# Patient Record
Sex: FEMALE | Race: WHITE | NOT HISPANIC OR LATINO | Employment: FULL TIME | URBAN - METROPOLITAN AREA
[De-identification: names, ages, dates, MRNs, and addresses within clinical notes are randomized per-mention and may not be internally consistent; named-entity substitution may affect disease eponyms.]

---

## 2017-02-27 ENCOUNTER — OFFICE VISIT (OUTPATIENT)
Dept: FAMILY MEDICINE CLINIC | Facility: CLINIC | Age: 47
End: 2017-02-27

## 2017-02-27 ENCOUNTER — HOSPITAL ENCOUNTER (OUTPATIENT)
Dept: GENERAL RADIOLOGY | Facility: HOSPITAL | Age: 47
Discharge: HOME | End: 2017-02-27
Admitting: PHYSICIAN ASSISTANT

## 2017-02-27 ENCOUNTER — TELEPHONE (OUTPATIENT)
Dept: FAMILY MEDICINE CLINIC | Facility: CLINIC | Age: 47
End: 2017-02-27

## 2017-02-27 VITALS
HEART RATE: 87 BPM | TEMPERATURE: 98.2 F | HEIGHT: 63 IN | RESPIRATION RATE: 18 BRPM | DIASTOLIC BLOOD PRESSURE: 78 MMHG | OXYGEN SATURATION: 97 % | SYSTOLIC BLOOD PRESSURE: 132 MMHG | BODY MASS INDEX: 38.8 KG/M2 | WEIGHT: 219 LBS

## 2017-02-27 DIAGNOSIS — R06.02 SHORTNESS OF BREATH: Primary | ICD-10-CM

## 2017-02-27 DIAGNOSIS — Z82.49 FAMILY HISTORY OF EARLY CAD: ICD-10-CM

## 2017-02-27 DIAGNOSIS — R05.3 CHRONIC COUGH: ICD-10-CM

## 2017-02-27 DIAGNOSIS — R06.02 SHORTNESS OF BREATH: ICD-10-CM

## 2017-02-27 PROCEDURE — 93000 ELECTROCARDIOGRAM COMPLETE: CPT | Performed by: PHYSICIAN ASSISTANT

## 2017-02-27 PROCEDURE — 99214 OFFICE O/P EST MOD 30 MIN: CPT | Performed by: PHYSICIAN ASSISTANT

## 2017-02-27 PROCEDURE — 71020 HC CHEST PA AND LATERAL: CPT

## 2017-02-27 RX ORDER — CETIRIZINE HYDROCHLORIDE 10 MG/1
10 TABLET ORAL DAILY
COMMUNITY

## 2017-02-27 RX ORDER — EPINEPHRINE 0.3 MG/.3ML
INJECTION INTRAMUSCULAR
COMMUNITY
Start: 2017-02-22

## 2017-02-27 RX ORDER — OMEPRAZOLE 40 MG/1
40 CAPSULE, DELAYED RELEASE ORAL DAILY
Qty: 30 CAPSULE | Refills: 3 | Status: SHIPPED | OUTPATIENT
Start: 2017-02-27 | End: 2017-11-30 | Stop reason: SDUPTHER

## 2017-02-27 RX ORDER — MOMETASONE FUROATE 50 UG/1
SPRAY, METERED NASAL
COMMUNITY
Start: 2017-02-19 | End: 2019-05-22

## 2017-02-27 NOTE — TELEPHONE ENCOUNTER
----- Message from Heidi Moe PA-C sent at 2/27/2017 12:54 PM EST -----  Notify patient that her chest x-ray was normal.

## 2017-02-27 NOTE — PROGRESS NOTES
Subjective   Juliet Fowler is a 47 y.o. female.   Chief Complaint   Patient presents with   • Cough     productive cough, pt has had cough for a few months, lots of drainage     History of Present Illness     Juliet is a 47-year-old female who presents with chronic cough for the past year or so.  The cough comes and goes.  She saw allergist, Dr. Metcalf, in January 2017.  She was told she was allergic to dust mites, mold spores and Bird grasses.  She has been doing allergy shots weekly.  She has tried over-the-counter Zantac occasionally for possible reflux.  The allergist states that it may be related to reflux  versus allergic rhinitis versus asthma.   Juliet states she does have shortness of air,wheezing and clear productive cough that waxes and wanes.  She denied any fevers, chills, night sweats, epigastric pain, heartburn, reflux symptoms, abdominal pain or swelling of her ankles.  Her father has had open-heart surgery and grandfather has had a heart attack.  Denied any smoking abuse.  She has been exposed to second hand growing up at home.  Caffeine intake is one glass of tea a day.    The following portions of the patient's history were reviewed and updated as appropriate: allergies, current medications, past family history, past medical history, past social history and past surgical history.    Review of Systems   Constitutional: Negative.  Negative for chills, fatigue and fever.   HENT: Positive for postnasal drip. Negative for congestion, ear discharge, rhinorrhea, sinus pressure, sneezing and sore throat.    Eyes: Negative.    Respiratory: Positive for cough, shortness of breath and wheezing. Negative for chest tightness.    Cardiovascular: Negative.  Negative for chest pain, palpitations and leg swelling.   Gastrointestinal: Negative.  Negative for abdominal pain, constipation, diarrhea, nausea and vomiting.   Endocrine: Negative.    Genitourinary: Negative.    Musculoskeletal: Negative.    Skin:  Negative.    Allergic/Immunologic: Negative.    Neurological: Negative.  Negative for dizziness and headaches.   Hematological: Negative.    Psychiatric/Behavioral: Negative.  Negative for sleep disturbance and suicidal ideas.   All other systems reviewed and are negative.      Vitals:    02/27/17 1032   BP: 132/78   Pulse: 87   Resp: 18   Temp: 98.2 °F (36.8 °C)   SpO2: 97%     Blood Pressures during visit    02/27/17 1032   BP: 132/78     Wt Readings from Last 3 Encounters:   02/27/17 219 lb (99.3 kg)   02/24/16 212 lb 4.8 oz (96.3 kg)       BP Readings from Last 3 Encounters:   02/27/17 132/78   02/24/16 134/90     Body mass index is 38.79 kg/(m^2).   No Known Allergies      Objective   Physical Exam   Constitutional: She is oriented to person, place, and time. Vital signs are normal. She appears well-developed and well-nourished.   HENT:   Head: Normocephalic and atraumatic.   Right Ear: Hearing, tympanic membrane, external ear and ear canal normal.   Left Ear: Hearing, tympanic membrane, external ear and ear canal normal.   Nose: Nose normal. Right sinus exhibits no maxillary sinus tenderness and no frontal sinus tenderness. Left sinus exhibits no maxillary sinus tenderness and no frontal sinus tenderness.   Mouth/Throat: Uvula is midline and mucous membranes are normal.   Slight post nasal drip   Eyes: Conjunctivae, EOM and lids are normal.   Neck: Trachea normal and phonation normal. Neck supple. No edema present.   Cardiovascular: Normal rate, regular rhythm, S1 normal, S2 normal, normal heart sounds and normal pulses.    Pulmonary/Chest: Effort normal. She has wheezes in the right upper field and the left upper field.   Abdominal: Soft. Normal appearance, normal aorta and bowel sounds are normal. There is no hepatomegaly. There is no tenderness.   Lymphadenopathy:     She has no cervical adenopathy.   Neurological: She is alert and oriented to person, place, and time.   Skin: Skin is warm, dry and intact.  "  Psychiatric: She has a normal mood and affect. Her speech is normal and behavior is normal. Judgment and thought content normal. Cognition and memory are normal.         ECG 12 Lead  Date/Time: 2/27/2017 10:53 AM  Performed by: TORITO GRACE  Authorized by: TORITO GRACE   Comparison: not compared with previous ECG   Previous ECG: no previous ECG available  Rhythm: sinus rhythm  Rate: normal  BPM: 81  Conduction: conduction normal  ST Segments: ST segments normal  T Waves: T waves normal  QRS axis: normal  Clinical impression: normal ECG  Comments: Indication: Shortness of breath and family history of CAD  P/SC:  110/170 ms  QRS:  72 ms  QT/QTc:  346/402 ms            Assessment/Plan   Juliet was seen today for cough.    Diagnoses and all orders for this visit:    Shortness of breath  -     ECG 12 Lead  -     XR Chest PA & Lateral; Future    Family history of early CAD  -     ECG 12 Lead  -     XR Chest PA & Lateral; Future    Chronic cough  -     XR Chest PA & Lateral; Future  -     omeprazole (priLOSEC) 40 MG capsule; Take 1 capsule by mouth Daily.      Ms. Chung \"Scarlet\" Janeth was seen in office today and diagnosed with chronic cough, shortness of breath and family history of CAD.  She had a normal EKG at office visit today.  I have reviewed her allergy office notes with her at today's office visit as well.  She will have a chest x-ray at the [place] facility today for further evaluation.  I have prescribed generic Prilosec to pharmacy to see if this is possible silent reflux related.  I've encouraged Scarlet to keep her appointment with her allergist in March.  She'll be notified of chest x-ray results when completed.  She was encouraged to continue using her inhalers as directed.  She voiced understanding.    Lorenzo transcription disclaimer    Much of this encounter note is an electronic transcription/translation of spoken language to printed text.  The electronic translation of spoken language may " permit erroneous, or at times, nonsensical words or phrases to be inadvertently transcribed.  Although I have reviewed the note for such errors, some may still exist.    Heidi Moe PA-C  Family Practice

## 2017-09-08 ENCOUNTER — TRANSCRIBE ORDERS (OUTPATIENT)
Dept: ADMINISTRATIVE | Facility: HOSPITAL | Age: 47
End: 2017-09-08

## 2017-09-08 DIAGNOSIS — Z12.31 VISIT FOR SCREENING MAMMOGRAM: Primary | ICD-10-CM

## 2017-09-21 ENCOUNTER — HOSPITAL ENCOUNTER (OUTPATIENT)
Dept: MAMMOGRAPHY | Facility: HOSPITAL | Age: 47
Discharge: HOME | End: 2017-09-21
Admitting: NURSE PRACTITIONER

## 2017-09-21 DIAGNOSIS — Z12.31 VISIT FOR SCREENING MAMMOGRAM: ICD-10-CM

## 2017-09-21 PROCEDURE — 77063 BREAST TOMOSYNTHESIS BI: CPT

## 2017-09-21 PROCEDURE — G0202 SCR MAMMO BI INCL CAD: HCPCS

## 2017-11-30 ENCOUNTER — OFFICE VISIT (OUTPATIENT)
Dept: FAMILY MEDICINE CLINIC | Facility: CLINIC | Age: 47
End: 2017-11-30

## 2017-11-30 VITALS
HEIGHT: 63 IN | SYSTOLIC BLOOD PRESSURE: 130 MMHG | BODY MASS INDEX: 39.34 KG/M2 | HEART RATE: 103 BPM | DIASTOLIC BLOOD PRESSURE: 72 MMHG | TEMPERATURE: 98.5 F | RESPIRATION RATE: 16 BRPM | WEIGHT: 222 LBS | OXYGEN SATURATION: 97 %

## 2017-11-30 DIAGNOSIS — R05.3 CHRONIC COUGH: ICD-10-CM

## 2017-11-30 DIAGNOSIS — N89.8 VAGINAL ITCHING: Primary | ICD-10-CM

## 2017-11-30 PROCEDURE — 99213 OFFICE O/P EST LOW 20 MIN: CPT | Performed by: PHYSICIAN ASSISTANT

## 2017-11-30 RX ORDER — OMEPRAZOLE 40 MG/1
40 CAPSULE, DELAYED RELEASE ORAL DAILY
Qty: 30 CAPSULE | Refills: 6 | Status: SHIPPED | OUTPATIENT
Start: 2017-11-30 | End: 2018-11-19 | Stop reason: SDUPTHER

## 2017-11-30 RX ORDER — FLUCONAZOLE 150 MG/1
150 TABLET ORAL DAILY
Qty: 2 TABLET | Refills: 0 | Status: SHIPPED | OUTPATIENT
Start: 2017-11-30 | End: 2018-11-19

## 2017-11-30 NOTE — PROGRESS NOTES
Subjective   Juliet Fowler is a 47 y.o. female.   Chief Complaint   Patient presents with   • Cough       History of Present Illness     Juliet is 47 showed female who presents with  Cough for the past several weeks. She has seen Urgent Care in [place].  She was prescribed antibiotic and steroids twice.  Her last UC visit was November 20,2017. She was prescribed Augmentin and a Medrol dose [place].  Juliet states she is not any better after medications.  She was having shortness of air and wheezing last week.  Juliet states that her breathing is better.  She has been using her inhalers every few days.  Scarlet has had some oral thrush,vaginal itching and diarrhea from the antibiotics. She has had increased post nasal drip,occasional sore throat/ear pressure and hoarse voice off and on for the past few weeks. Denied any fevers,chills,nausea,vomiting,or headaches.  Juliet has taken OTC Mucinex and Dayquil which has helped slightly.  She was taking Omeprazole but ran out several months ago.  When she was taking the omeprazole she was doing better with the cough.  Diet has been slightly healthy.  She has been drinking less caffeine and no spicy foods.  She has seen the allergist in past who thought her cough was related to Reflux.    The following portions of the patient's history were reviewed and updated as appropriate: allergies, current medications, past family history, past medical history, past social history and past surgical history.    Review of Systems   Constitutional: Negative.    HENT: Positive for ear pain, sore throat and voice change.    Eyes: Negative.    Respiratory: Positive for cough. Negative for chest tightness, shortness of breath and wheezing.    Cardiovascular: Negative.  Negative for chest pain, palpitations and leg swelling.   Gastrointestinal: Positive for diarrhea. Negative for constipation, nausea and vomiting.   Endocrine: Negative.    Genitourinary: Negative.         Vaginal itching secondary  "to recent antibiotic usage   Musculoskeletal: Negative.    Skin: Negative.    Allergic/Immunologic: Negative.    Neurological: Negative.    Hematological: Negative.    Psychiatric/Behavioral: Negative.    All other systems reviewed and are negative.    Vitals:    11/30/17 0853   BP: 130/72   BP Location: Left arm   Patient Position: Sitting   Cuff Size: Large Adult   Pulse: 103   Resp: 16   Temp: 98.5 °F (36.9 °C)   TempSrc: Oral   SpO2: 97%   Weight: 222 lb (101 kg)   Height: 63\" (160 cm)       Wt Readings from Last 3 Encounters:   11/30/17 222 lb (101 kg)   02/27/17 219 lb (99.3 kg)   02/24/16 212 lb 4.8 oz (96.3 kg)       BP Readings from Last 3 Encounters:   11/30/17 130/72   02/27/17 132/78   02/24/16 134/90     Body mass index is 39.33 kg/(m^2).  No Known Allergies    Objective   Physical Exam   Constitutional: She is oriented to person, place, and time. Vital signs are normal. She appears well-developed and well-nourished.   HENT:   Head: Normocephalic and atraumatic.   Right Ear: Hearing, tympanic membrane, external ear and ear canal normal.   Left Ear: Hearing, tympanic membrane, external ear and ear canal normal.   Nose: Nose normal. Right sinus exhibits no maxillary sinus tenderness and no frontal sinus tenderness. Left sinus exhibits no maxillary sinus tenderness and no frontal sinus tenderness.   Mouth/Throat: Uvula is midline and mucous membranes are normal.   Slight post nasal drip   Eyes: Conjunctivae, EOM and lids are normal.   Neck: Trachea normal and phonation normal. Neck supple. No edema present. No thyromegaly present.   Cardiovascular: Normal rate, regular rhythm, S1 normal, S2 normal, normal heart sounds and normal pulses.    Pulmonary/Chest: Effort normal and breath sounds normal.   Abdominal: Soft. Normal appearance, normal aorta and bowel sounds are normal. There is no hepatomegaly. There is no tenderness.   Lymphadenopathy:     She has no cervical adenopathy.   Neurological: She is alert " and oriented to person, place, and time.   Skin: Skin is warm, dry and intact.   Psychiatric: She has a normal mood and affect. Her speech is normal and behavior is normal. Judgment and thought content normal. Cognition and memory are normal.       Assessment/Plan   Juliet was seen today for cough.    Diagnoses and all orders for this visit:    Vaginal itching  -     fluconazole (DIFLUCAN) 150 MG tablet; Take 1 tablet by mouth Daily. May repeat dose 4 days later if needed    Chronic cough  -     omeprazole (priLOSEC) 40 MG capsule; Take 1 capsule by mouth Daily.      1.  Chronic cough: I suspect her cough is related to Reflux.  I have sent a prescription for refill on her generic Prilosec medication.  She will continue using her inhalers for asthma as directed.  She has no improvement with cough she will schedule follow-up appointment with office.  2.  New vaginal itching: I suspect this is related to her recent rounds of antibiotic.  I have prescribed Diflucan to pharmacy.  Juliet was instructed to use over-the-counter probiotics or yogurt.  She will return to office if no improvement.        Dragon transcription disclaimer    Much of this encounter note is an electronic transcription/translation of spoken language to printed text.  The electronic translation of spoken language may permit erroneous, or at times, nonsensical words or phrases to be inadvertently transcribed.  Although I have reviewed the note for such errors, some may still exist.    Heidi Moe PA-C  Family Practice

## 2018-01-25 ENCOUNTER — OFFICE VISIT (OUTPATIENT)
Dept: FAMILY MEDICINE CLINIC | Facility: CLINIC | Age: 48
End: 2018-01-25

## 2018-01-25 VITALS
HEIGHT: 63 IN | DIASTOLIC BLOOD PRESSURE: 72 MMHG | BODY MASS INDEX: 39.34 KG/M2 | OXYGEN SATURATION: 98 % | RESPIRATION RATE: 16 BRPM | HEART RATE: 85 BPM | SYSTOLIC BLOOD PRESSURE: 130 MMHG | WEIGHT: 222 LBS | TEMPERATURE: 97.8 F

## 2018-01-25 DIAGNOSIS — E78.00 HYPERCHOLESTEROLEMIA: ICD-10-CM

## 2018-01-25 DIAGNOSIS — R00.2 HEART PALPITATIONS: Primary | ICD-10-CM

## 2018-01-25 PROCEDURE — 99214 OFFICE O/P EST MOD 30 MIN: CPT | Performed by: PHYSICIAN ASSISTANT

## 2018-01-25 PROCEDURE — 93000 ELECTROCARDIOGRAM COMPLETE: CPT | Performed by: PHYSICIAN ASSISTANT

## 2018-01-25 NOTE — PROGRESS NOTES
"Subjective   Juliet Fowler is a 47 y.o. female.   Chief Complaint   Patient presents with   • heart palpatations       History of Present Illness     Juliet is a 47-year-old female who presents with new onset heart palpitations.  States she has had hear  Palpitations in the past that would come and go.She has had period of fatigue with the heart palpitations.  Juliet has had some stress at work.  She has notice when she is stress when she has the heart palpitations.  She may drink 20 oz of tea a day.  Denied any chest pain,shortness of air, dizziness or swelling of ankles.Her father has A-Fib.  Sleep has been slightly restless.  Appetite has been normal. Juliet has asthma but has not used her inhalers very often.      The following portions of the patient's history were reviewed and updated as appropriate: allergies, current medications, past family history, past medical history, past social history and past surgical history.    Review of Systems   Constitutional: Positive for fatigue. Negative for chills and fever.   HENT: Negative.    Eyes: Negative.    Respiratory: Negative.  Negative for cough, shortness of breath and wheezing.    Cardiovascular: Positive for palpitations. Negative for chest pain and leg swelling.   Gastrointestinal: Negative.  Negative for constipation, diarrhea, nausea and vomiting.   Endocrine: Negative.    Genitourinary: Negative.    Musculoskeletal: Negative.    Skin: Negative.    Allergic/Immunologic: Negative.    Neurological: Negative.  Negative for dizziness, light-headedness and headaches.   Hematological: Negative.    Psychiatric/Behavioral: Negative.    All other systems reviewed and are negative.    Vitals:    01/25/18 0917   BP: 130/72   BP Location: Left arm   Patient Position: Sitting   Cuff Size: Large Adult   Pulse: 85   Resp: 16   Temp: 97.8 °F (36.6 °C)   TempSrc: Oral   SpO2: 98%   Weight: 101 kg (222 lb)   Height: 160 cm (63\")       Wt Readings from Last 3 Encounters: "   01/25/18 101 kg (222 lb)   11/30/17 101 kg (222 lb)   02/27/17 99.3 kg (219 lb)       BP Readings from Last 3 Encounters:   01/25/18 130/72   11/30/17 130/72   02/27/17 132/78     Body mass index is 39.33 kg/(m^2).  No Known Allergies    Objective   Physical Exam   Constitutional: She is oriented to person, place, and time. Vital signs are normal. She appears well-developed and well-nourished.   HENT:   Head: Normocephalic and atraumatic.   Right Ear: Hearing, tympanic membrane, external ear and ear canal normal.   Left Ear: Hearing, tympanic membrane, external ear and ear canal normal.   Nose: Nose normal. Right sinus exhibits no maxillary sinus tenderness and no frontal sinus tenderness. Left sinus exhibits no maxillary sinus tenderness and no frontal sinus tenderness.   Mouth/Throat: Uvula is midline, oropharynx is clear and moist and mucous membranes are normal.   Eyes: Conjunctivae, EOM and lids are normal.   Neck: Trachea normal and phonation normal. Neck supple. Carotid bruit is not present. No edema present. No thyromegaly present.   Cardiovascular: Normal rate, regular rhythm, S1 normal, S2 normal, normal heart sounds and normal pulses.    Pulmonary/Chest: Effort normal and breath sounds normal.   Abdominal: Soft. Normal appearance, normal aorta and bowel sounds are normal. There is no hepatomegaly. There is no tenderness.   Lymphadenopathy:     She has no cervical adenopathy.   Neurological: She is alert and oriented to person, place, and time.   Skin: Skin is warm, dry and intact.   Psychiatric: She has a normal mood and affect. Her speech is normal and behavior is normal. Judgment and thought content normal. Cognition and memory are normal.           ECG 12 Lead  Date/Time: 1/25/2018 9:27 AM  Performed by: TORITO GRACE  Authorized by: TORITO GRACE   Comparison: not compared with previous ECG   Rhythm: sinus rhythm  Rate: normal  BPM: 75  Conduction: conduction normal  ST Segments: ST segments  normal  T Waves: T waves normal  QRS axis: normal  Clinical impression: normal ECG  Comments: Indication: Heart palpitations    P/AR:  114/174 ms  QRS:  72 ms  QT/QTc:  370/413 ms            Assessment/Plan   Juliet was seen today for heart palpatations.    Diagnoses and all orders for this visit:    Heart palpitations  -     ECG 12 Lead  -     Thyroid Panel With TSH  -     CBC & Differential  -     Comprehensive Metabolic Panel  -     Holter Monitor - 24 Hour; Future    Hypercholesterolemia  -     Lipid Panel With LDL / HDL Ratio    Other orders  -     Cancel: Basic Metabolic Panel; Future      1.  New heart palpitations: Juliet had a normal EKG at today's office visit.  I will schedule a 24-hour Holter monitor for further evaluation of her symptoms.  She will have a CBC, CMP, and a thyroid profile with TSH collected at today's office visit.  She will be notified of test results when completed.  I've asked her to keep a diary of her symptoms.  2.  Chronic and stable hypercholesterolemia: She is fasting will have a lipid profile collected with the above blood work.  She'll be notified of test results when completed.        Dragon transcription disclaimer    Much of this encounter note is an electronic transcription/translation of spoken language to printed text.  The electronic translation of spoken language may permit erroneous, or at times, nonsensical words or phrases to be inadvertently transcribed.  Although I have reviewed the note for such errors, some may still exist.    Heidi Moe PA-C  Family Practice

## 2018-01-26 ENCOUNTER — TELEPHONE (OUTPATIENT)
Dept: FAMILY MEDICINE CLINIC | Facility: CLINIC | Age: 48
End: 2018-01-26

## 2018-01-26 LAB
ALBUMIN SERPL-MCNC: 4.5 G/DL (ref 3.5–5.2)
ALBUMIN/GLOB SERPL: 1.6 G/DL
ALP SERPL-CCNC: 98 U/L (ref 40–129)
ALT SERPL-CCNC: 23 U/L (ref 5–33)
AST SERPL-CCNC: 19 U/L (ref 5–32)
BASOPHILS # BLD AUTO: 0.05 10*3/MM3 (ref 0–0.2)
BASOPHILS NFR BLD AUTO: 0.5 % (ref 0–2)
BILIRUB SERPL-MCNC: 0.6 MG/DL (ref 0.2–1.2)
BUN SERPL-MCNC: 10 MG/DL (ref 6–20)
BUN/CREAT SERPL: 13.2 (ref 7–25)
CALCIUM SERPL-MCNC: 9.4 MG/DL (ref 8.6–10.5)
CHLORIDE SERPL-SCNC: 98 MMOL/L (ref 98–107)
CHOLEST SERPL-MCNC: 201 MG/DL (ref 0–200)
CO2 SERPL-SCNC: 28 MMOL/L (ref 22–29)
CREAT SERPL-MCNC: 0.76 MG/DL (ref 0.57–1)
EOSINOPHIL # BLD AUTO: 0.06 10*3/MM3 (ref 0.1–0.3)
EOSINOPHIL NFR BLD AUTO: 0.6 % (ref 0–4)
ERYTHROCYTE [DISTWIDTH] IN BLOOD BY AUTOMATED COUNT: 12.8 % (ref 11.5–14.5)
FT4I SERPL CALC-MCNC: 2 (ref 1.2–4.9)
GFR SERPLBLD CREATININE-BSD FMLA CKD-EPI: 82 ML/MIN/1.73
GFR SERPLBLD CREATININE-BSD FMLA CKD-EPI: 99 ML/MIN/1.73
GLOBULIN SER CALC-MCNC: 2.8 GM/DL
GLUCOSE SERPL-MCNC: 89 MG/DL (ref 65–99)
HCT VFR BLD AUTO: 41.8 % (ref 37–47)
HDLC SERPL-MCNC: 48 MG/DL (ref 40–60)
HGB BLD-MCNC: 13.8 G/DL (ref 12–16)
IMM GRANULOCYTES # BLD: 0.04 10*3/MM3 (ref 0–0.03)
IMM GRANULOCYTES NFR BLD: 0.4 % (ref 0–0.5)
LDLC SERPL CALC-MCNC: 135 MG/DL (ref 0–100)
LDLC/HDLC SERPL: 2.8 {RATIO}
LYMPHOCYTES # BLD AUTO: 1.25 10*3/MM3 (ref 0.6–4.8)
LYMPHOCYTES NFR BLD AUTO: 13.1 % (ref 20–45)
MCH RBC QN AUTO: 32.5 PG (ref 27–31)
MCHC RBC AUTO-ENTMCNC: 33 G/DL (ref 31–37)
MCV RBC AUTO: 98.4 FL (ref 81–99)
MONOCYTES # BLD AUTO: 0.63 10*3/MM3 (ref 0–1)
MONOCYTES NFR BLD AUTO: 6.6 % (ref 3–8)
NEUTROPHILS # BLD AUTO: 7.48 10*3/MM3 (ref 1.5–8.3)
NEUTROPHILS NFR BLD AUTO: 78.8 % (ref 45–70)
NRBC BLD AUTO-RTO: 0 /100 WBC (ref 0–0)
PLATELET # BLD AUTO: 345 10*3/MM3 (ref 140–500)
POTASSIUM SERPL-SCNC: 4.2 MMOL/L (ref 3.5–5.2)
PROT SERPL-MCNC: 7.3 G/DL (ref 6–8.5)
RBC # BLD AUTO: 4.25 10*6/MM3 (ref 4.2–5.4)
SODIUM SERPL-SCNC: 138 MMOL/L (ref 136–145)
T3RU NFR SERPL: 24 % (ref 24–39)
T4 SERPL-MCNC: 8.4 UG/DL (ref 4.5–12)
TRIGL SERPL-MCNC: 92 MG/DL (ref 0–150)
TSH SERPL DL<=0.005 MIU/L-ACNC: 1.68 UIU/ML (ref 0.45–4.5)
VLDLC SERPL CALC-MCNC: 18.4 MG/DL (ref 7–27)
WBC # BLD AUTO: 9.51 10*3/MM3 (ref 4.8–10.8)

## 2018-01-26 NOTE — TELEPHONE ENCOUNTER
----- Message from Heidi Moe PA-C sent at 1/26/2018  7:33 AM EST -----  1.  Notify patient that thyroid profile was normal.  2.  CBC was normal.  She is not anemic.  3.  Fasting blood sugar was 89 which is normal.  4.  Cholesterol was 201, triglycerides 92, HDL 48 and LDL was 135.  Her cholesterol and LDL cholesterol were slightly elevated.  Please decrease fatty food in diet and increase physical activity.  Please keep appointment for Holter monitor for further evaluation of palpitations.  Plan to repeat fasting blood work in 6-12 months.

## 2018-01-26 NOTE — TELEPHONE ENCOUNTER
Patient notified of lab results and to eat heart healthy diet, and patient does have holter monitor scheduled.

## 2018-01-30 ENCOUNTER — HOSPITAL ENCOUNTER (OUTPATIENT)
Dept: CARDIOLOGY | Facility: HOSPITAL | Age: 48
Discharge: HOME | End: 2018-01-30
Admitting: PHYSICIAN ASSISTANT

## 2018-01-30 DIAGNOSIS — R00.2 HEART PALPITATIONS: ICD-10-CM

## 2018-01-30 PROCEDURE — 93225 XTRNL ECG REC<48 HRS REC: CPT

## 2018-01-30 PROCEDURE — 93226 XTRNL ECG REC<48 HR SCAN A/R: CPT

## 2018-02-01 ENCOUNTER — TELEPHONE (OUTPATIENT)
Dept: FAMILY MEDICINE CLINIC | Facility: CLINIC | Age: 48
End: 2018-02-01

## 2018-02-01 PROCEDURE — 93227 XTRNL ECG REC<48 HR R&I: CPT | Performed by: INTERNAL MEDICINE

## 2018-02-01 NOTE — TELEPHONE ENCOUNTER
----- Message from Heidi Moe PA-C sent at 2/1/2018 12:20 PM EST -----  Notify patient that Holter monitor was normal.

## 2018-08-23 ENCOUNTER — TRANSCRIBE ORDERS (OUTPATIENT)
Dept: FAMILY MEDICINE CLINIC | Facility: CLINIC | Age: 48
End: 2018-08-23

## 2018-08-23 DIAGNOSIS — Z12.31 SCREENING MAMMOGRAM, ENCOUNTER FOR: Primary | ICD-10-CM

## 2018-09-25 ENCOUNTER — HOSPITAL ENCOUNTER (OUTPATIENT)
Dept: MAMMOGRAPHY | Facility: HOSPITAL | Age: 48
Discharge: HOME | End: 2018-09-25
Admitting: PHYSICIAN ASSISTANT

## 2018-09-25 DIAGNOSIS — Z12.31 SCREENING MAMMOGRAM, ENCOUNTER FOR: ICD-10-CM

## 2018-09-25 PROCEDURE — 77067 SCR MAMMO BI INCL CAD: CPT

## 2018-09-25 PROCEDURE — 77063 BREAST TOMOSYNTHESIS BI: CPT

## 2018-11-19 ENCOUNTER — OFFICE VISIT (OUTPATIENT)
Dept: FAMILY MEDICINE CLINIC | Facility: CLINIC | Age: 48
End: 2018-11-19

## 2018-11-19 VITALS
HEIGHT: 63 IN | TEMPERATURE: 98.2 F | RESPIRATION RATE: 16 BRPM | BODY MASS INDEX: 39.51 KG/M2 | DIASTOLIC BLOOD PRESSURE: 72 MMHG | SYSTOLIC BLOOD PRESSURE: 128 MMHG | WEIGHT: 223 LBS | HEART RATE: 85 BPM | OXYGEN SATURATION: 98 %

## 2018-11-19 DIAGNOSIS — J45.20 MILD INTERMITTENT ASTHMA WITHOUT COMPLICATION: Primary | ICD-10-CM

## 2018-11-19 DIAGNOSIS — R12 HEARTBURN: ICD-10-CM

## 2018-11-19 PROCEDURE — 99213 OFFICE O/P EST LOW 20 MIN: CPT | Performed by: PHYSICIAN ASSISTANT

## 2018-11-19 RX ORDER — OMEPRAZOLE 40 MG/1
40 CAPSULE, DELAYED RELEASE ORAL DAILY
Qty: 30 CAPSULE | Refills: 6 | Status: SHIPPED | OUTPATIENT
Start: 2018-11-19 | End: 2019-07-18

## 2018-11-19 RX ORDER — FLUTICASONE PROPIONATE AND SALMETEROL XINAFOATE 115; 21 UG/1; UG/1
2 AEROSOL, METERED RESPIRATORY (INHALATION)
Qty: 12 G | Refills: 6 | Status: SHIPPED | OUTPATIENT
Start: 2018-11-19 | End: 2019-03-04

## 2018-11-19 NOTE — PROGRESS NOTES
"Subjective   Juliet Fowler is a 48 y.o. female presents for   Chief Complaint   Patient presents with   • Cough   • Asthma       History of Present Illness     Juliet is a 48-year-old female who presents with increased asthma issues.  Juliet has had a clear productive cough,wheezing,head congestion,chest congestion,stuffy nose,and post nasal drip  for the past 2 weeks.  She has been out inhalers and heart burn medication..  She has a history of Asthma.  Denied any chest pain,shortness of air ,headaches,ear pressure,nausea,vomiting or diarrhea.  Appetite has been poor.  Sleep has been restless lately with congested.    The following portions of the patient's history were reviewed and updated as appropriate: allergies, current medications, past family history, past medical history, past social history, past surgical history and problem list.    Review of Systems   Constitutional: Negative.  Negative for chills, fatigue and fever.   HENT: Positive for congestion and postnasal drip. Negative for ear pain, rhinorrhea, sinus pressure, sinus pain and sore throat.    Eyes: Negative.    Respiratory: Positive for cough and wheezing. Negative for shortness of breath.    Cardiovascular: Negative.  Negative for chest pain, palpitations and leg swelling.   Gastrointestinal: Negative.  Negative for constipation, diarrhea and vomiting.   Endocrine: Negative.    Genitourinary: Negative.    Musculoskeletal: Negative.    Skin: Negative.    Allergic/Immunologic: Negative.    Neurological: Negative.  Negative for headaches.   Hematological: Negative.    Psychiatric/Behavioral: Negative.    All other systems reviewed and are negative.        Vitals:    11/19/18 0840   BP: 128/72   BP Location: Left arm   Patient Position: Sitting   Cuff Size: Adult   Pulse: 85   Resp: 16   Temp: 98.2 °F (36.8 °C)   TempSrc: Oral   SpO2: 98%   Weight: 101 kg (223 lb)   Height: 160 cm (63\")     Wt Readings from Last 3 Encounters:   11/19/18 101 kg (223 " lb)   01/25/18 101 kg (222 lb)   11/30/17 101 kg (222 lb)       BP Readings from Last 3 Encounters:   11/19/18 128/72   01/25/18 130/72   11/30/17 130/72     Social History     Socioeconomic History   • Marital status:      Spouse name: Not on file   • Number of children: Not on file   • Years of education: Not on file   • Highest education level: Not on file   Social Needs   • Financial resource strain: Not on file   • Food insecurity - worry: Not on file   • Food insecurity - inability: Not on file   • Transportation needs - medical: Not on file   • Transportation needs - non-medical: Not on file   Occupational History   • Not on file   Tobacco Use   • Smoking status: Never Smoker   Substance and Sexual Activity   • Alcohol use: Not on file   • Drug use: Not on file   • Sexual activity: Not on file   Other Topics Concern   • Not on file   Social History Narrative   • Not on file       No Known Allergies    Body mass index is 39.5 kg/m².    Objective   Physical Exam   Constitutional: She is oriented to person, place, and time. Vital signs are normal. She appears well-developed and well-nourished.   HENT:   Head: Normocephalic and atraumatic.   Right Ear: Hearing, tympanic membrane, external ear and ear canal normal.   Left Ear: Hearing, tympanic membrane, external ear and ear canal normal.   Nose: Nose normal. Right sinus exhibits no maxillary sinus tenderness and no frontal sinus tenderness. Left sinus exhibits no maxillary sinus tenderness and no frontal sinus tenderness.   Mouth/Throat: Uvula is midline, oropharynx is clear and moist and mucous membranes are normal.   Eyes: Conjunctivae, EOM and lids are normal. Pupils are equal, round, and reactive to light.   Neck: Trachea normal and phonation normal. Neck supple. No edema present.   Cardiovascular: Normal rate, regular rhythm, S1 normal, S2 normal, normal heart sounds and normal pulses.   Pulmonary/Chest: Effort normal and breath sounds normal.    Abdominal: Soft. Normal appearance, normal aorta and bowel sounds are normal. There is no hepatomegaly. There is no tenderness.   Lymphadenopathy:     She has no cervical adenopathy.   Neurological: She is alert and oriented to person, place, and time.   Skin: Skin is warm, dry and intact. Capillary refill takes less than 2 seconds.   Psychiatric: She has a normal mood and affect. Her speech is normal and behavior is normal. Judgment and thought content normal. Cognition and memory are normal.       Assessment/Plan   Juliet was seen today for cough and asthma.    Diagnoses and all orders for this visit:    Mild intermittent asthma without complication  -     PROAIR  (90 Base) MCG/ACT inhaler; Inhale 1 puff Every 6 (Six) Hours As Needed for Wheezing.  -     ADVAIR -21 MCG/ACT inhaler; Inhale 2 puffs 2 (Two) Times a Day.    Heartburn  -     omeprazole (priLOSEC) 40 MG capsule; Take 1 capsule by mouth Daily.    1.  Chronic asthma: Scarlet has been out of her inhalers.  I have refilled her Advair and Proair inhalers to pharmacy.  She will use*did.  She may start over-the-counter Mucinex as needed for congestion.  No antibiotic is required at this time.  Scarlet will return to office if symptoms do not improve.  2.  Chronic and stable heartburn: She has been out of her Prilosec for several weeks.  I have refilled this to her local pharmacy.      Heidi Moe PA-C    [facility]  [address] [place] [id number]  Dept: [phone]  Dept Fax: [phone]  Loc: [phone]  Loc Fax: [phone]

## 2019-01-29 ENCOUNTER — OFFICE VISIT (OUTPATIENT)
Dept: FAMILY MEDICINE CLINIC | Facility: CLINIC | Age: 49
End: 2019-01-29

## 2019-01-29 VITALS
DIASTOLIC BLOOD PRESSURE: 86 MMHG | SYSTOLIC BLOOD PRESSURE: 128 MMHG | HEIGHT: 63 IN | BODY MASS INDEX: 40.04 KG/M2 | RESPIRATION RATE: 16 BRPM | TEMPERATURE: 98.4 F | OXYGEN SATURATION: 97 % | WEIGHT: 226 LBS | HEART RATE: 98 BPM

## 2019-01-29 DIAGNOSIS — J02.0 STREPTOCOCCAL SORE THROAT: ICD-10-CM

## 2019-01-29 DIAGNOSIS — J02.9 SORE THROAT: Primary | ICD-10-CM

## 2019-01-29 LAB
EXPIRATION DATE: ABNORMAL
INTERNAL CONTROL: ABNORMAL
Lab: ABNORMAL
S PYO AG THROAT QL: POSITIVE

## 2019-01-29 PROCEDURE — 87880 STREP A ASSAY W/OPTIC: CPT | Performed by: NURSE PRACTITIONER

## 2019-01-29 PROCEDURE — 99213 OFFICE O/P EST LOW 20 MIN: CPT | Performed by: NURSE PRACTITIONER

## 2019-01-29 RX ORDER — AMOXICILLIN AND CLAVULANATE POTASSIUM 875; 125 MG/1; MG/1
1 TABLET, FILM COATED ORAL EVERY 12 HOURS SCHEDULED
Qty: 20 TABLET | Refills: 0 | Status: SHIPPED | OUTPATIENT
Start: 2019-01-29 | End: 2019-02-08

## 2019-01-29 NOTE — PROGRESS NOTES
"Chief Complaint   Patient presents with   • Sore Throat   • Fatigue       Upper Respiratory Infection: Patient complains of symptoms of a URI. Symptoms include sore throat and fatigue. Onset of symptoms was 1 day ago, gradually worsening since that time. She also c/o achiness and no  fever for the past 1 day .  She is drinking plenty of fluids. Evaluation to date: none. Treatment to date: none.  Ill contacts at home or school or work discussed. - none  Treated for strept about one month.  Was seen at Urgent Care and treated with amoxicillin.      The following portions of the patient's history were reviewed and updated as appropriate: allergies, current medications, past family history, past medical history, past social history, past surgical history and problem list.    A comprehensive review of systems was negative except for: Constitutional: positive for fatigue  Ears, nose, mouth, throat, and face: positive for sore throat    Vitals:    01/29/19 1300   BP: 128/86   BP Location: Left arm   Patient Position: Sitting   Cuff Size: Adult   Pulse: 98   Resp: 16   Temp: 98.4 °F (36.9 °C)   SpO2: 97%   Weight: 103 kg (226 lb)   Height: 160 cm (63\")     Gen: Mildly ill appearing, alert  Ears: TM's bulging without redness  Nose:  Congestion  Throat:  Red, white exudate   Neck: No LAD  Lung: Good air movement, regular RR  Heart: RR without murmur  Skin: No rash      Assessment/Plan   Juliet was seen today for sore throat and fatigue.    Diagnoses and all orders for this visit:    Sore throat  -     POC Rapid Strep A    Streptococcal sore throat  -     amoxicillin-clavulanate (AUGMENTIN) 875-125 MG per tablet; Take 1 tablet by mouth Every 12 (Twelve) Hours for 10 days.             Patient Instructions   Strep Throat  Strep throat is an infection of the throat. It is caused by germs. Strep throat spreads from person to person because of coughing, sneezing, or close contact.  Follow these instructions at " home:  Medicines  · Take over-the-counter and prescription medicines only as told by your doctor.  · Take your antibiotic medicine as told by your doctor. Do not stop taking the medicine even if you feel better.  · Have family members who also have a sore throat or fever go to a doctor.  Eating and drinking  · Do not share food, drinking cups, or personal items.  · Try eating soft foods until your sore throat feels better.  · Drink enough fluid to keep your pee (urine) clear or pale yellow.  General instructions  · Rinse your mouth (gargle) with a salt-water mixture 3-4 times per day or as needed. To make a salt-water mixture, stir ½-1 tsp of salt into 1 cup of warm water.  · Make sure that all people in your house wash their hands well.  · Rest.  · Stay home from school or work until you have been taking antibiotics for 24 hours.  · Keep all follow-up visits as told by your doctor. This is important.  Contact a doctor if:  · Your neck keeps getting bigger.  · You get a rash, cough, or earache.  · You cough up thick liquid that is green, yellow-brown, or bloody.  · You have pain that does not get better with medicine.  · Your problems get worse instead of getting better.  · You have a fever.  Get help right away if:  · You throw up (vomit).  · You get a very bad headache.  · You neck hurts or it feels stiff.  · You have chest pain or you are short of breath.  · You have drooling, very bad throat pain, or changes in your voice.  · Your neck is swollen or the skin gets red and tender.  · Your mouth is dry or you are peeing less than normal.  · You keep feeling more tired or it is hard to wake up.  · Your joints are red or they hurt.  This information is not intended to replace advice given to you by your health care provider. Make sure you discuss any questions you have with your health care provider.  Document Released: 06/05/2009 Document Revised: 08/16/2017 Document Reviewed: 04/11/2016  [employer]  Patient Education © 2018 [employer]        Tylenol or Advil as needed for pain, fever, muscle aches  Plenty of fluids  Hand washing discussed  Off work or school note given if needed.  Warm tea for throat.  Pros and cons of antibiotic use discussed    CLINT Palmer  Family Practice  [facility] [place]

## 2019-02-22 DIAGNOSIS — E78.00 HYPERCHOLESTEROLEMIA: Primary | ICD-10-CM

## 2019-02-25 LAB
ALBUMIN SERPL-MCNC: 4.3 G/DL (ref 3.5–5.2)
ALBUMIN/GLOB SERPL: 1.4 G/DL
ALP SERPL-CCNC: 90 U/L (ref 40–129)
ALT SERPL-CCNC: 21 U/L (ref 5–33)
AST SERPL-CCNC: 17 U/L (ref 5–32)
BASOPHILS # BLD AUTO: 0.05 10*3/MM3 (ref 0–0.2)
BASOPHILS NFR BLD AUTO: 0.5 % (ref 0–1.5)
BILIRUB SERPL-MCNC: 0.4 MG/DL (ref 0.2–1.2)
BUN SERPL-MCNC: 8 MG/DL (ref 6–20)
BUN/CREAT SERPL: 11 (ref 7–25)
CALCIUM SERPL-MCNC: 9 MG/DL (ref 8.6–10.5)
CHLORIDE SERPL-SCNC: 100 MMOL/L (ref 98–107)
CHOLEST SERPL-MCNC: 199 MG/DL (ref 0–200)
CHOLEST/HDLC SERPL: 3.83 {RATIO}
CO2 SERPL-SCNC: 29 MMOL/L (ref 22–29)
CREAT SERPL-MCNC: 0.73 MG/DL (ref 0.57–1)
EOSINOPHIL # BLD AUTO: 0.09 10*3/MM3 (ref 0–0.4)
EOSINOPHIL NFR BLD AUTO: 1 % (ref 0.3–6.2)
ERYTHROCYTE [DISTWIDTH] IN BLOOD BY AUTOMATED COUNT: 12.8 % (ref 12.3–15.4)
GLOBULIN SER CALC-MCNC: 3 GM/DL
GLUCOSE SERPL-MCNC: 91 MG/DL (ref 65–99)
HCT VFR BLD AUTO: 42.2 % (ref 34–46.6)
HDLC SERPL-MCNC: 52 MG/DL (ref 40–60)
HGB BLD-MCNC: 13.6 G/DL (ref 12–15.9)
IMM GRANULOCYTES # BLD AUTO: 0.06 10*3/MM3 (ref 0–0.05)
IMM GRANULOCYTES NFR BLD AUTO: 0.6 % (ref 0–0.5)
LDLC SERPL CALC-MCNC: 133 MG/DL (ref 0–100)
LYMPHOCYTES # BLD AUTO: 1.51 10*3/MM3 (ref 0.7–3.1)
LYMPHOCYTES NFR BLD AUTO: 15.9 % (ref 19.6–45.3)
MCH RBC QN AUTO: 32.2 PG (ref 26.6–33)
MCHC RBC AUTO-ENTMCNC: 32.2 G/DL (ref 31.5–35.7)
MCV RBC AUTO: 99.8 FL (ref 79–97)
MONOCYTES # BLD AUTO: 0.6 10*3/MM3 (ref 0.1–0.9)
MONOCYTES NFR BLD AUTO: 6.3 % (ref 5–12)
NEUTROPHILS # BLD AUTO: 7.16 10*3/MM3 (ref 1.4–7)
NEUTROPHILS NFR BLD AUTO: 75.7 % (ref 42.7–76)
NRBC BLD AUTO-RTO: 0 /100 WBC (ref 0–0)
PLATELET # BLD AUTO: 338 10*3/MM3 (ref 140–450)
POTASSIUM SERPL-SCNC: 4.4 MMOL/L (ref 3.5–5.2)
PROT SERPL-MCNC: 7.3 G/DL (ref 6–8.5)
RBC # BLD AUTO: 4.23 10*6/MM3 (ref 3.77–5.28)
SODIUM SERPL-SCNC: 137 MMOL/L (ref 136–145)
TRIGL SERPL-MCNC: 68 MG/DL (ref 0–150)
VLDLC SERPL CALC-MCNC: 13.6 MG/DL (ref 7–27)
WBC # BLD AUTO: 9.47 10*3/MM3 (ref 3.4–10.8)

## 2019-03-04 ENCOUNTER — OFFICE VISIT (OUTPATIENT)
Dept: FAMILY MEDICINE CLINIC | Facility: CLINIC | Age: 49
End: 2019-03-04

## 2019-03-04 VITALS
RESPIRATION RATE: 16 BRPM | OXYGEN SATURATION: 98 % | TEMPERATURE: 98.3 F | DIASTOLIC BLOOD PRESSURE: 90 MMHG | SYSTOLIC BLOOD PRESSURE: 140 MMHG | HEIGHT: 63 IN | WEIGHT: 228 LBS | BODY MASS INDEX: 40.4 KG/M2 | HEART RATE: 87 BPM

## 2019-03-04 DIAGNOSIS — Z00.00 ANNUAL PHYSICAL EXAM: Primary | ICD-10-CM

## 2019-03-04 DIAGNOSIS — R53.82 CHRONIC FATIGUE: ICD-10-CM

## 2019-03-04 DIAGNOSIS — E78.00 HYPERCHOLESTEROLEMIA: ICD-10-CM

## 2019-03-04 DIAGNOSIS — R03.0 BORDERLINE HYPERTENSION: ICD-10-CM

## 2019-03-04 DIAGNOSIS — J45.20 MILD INTERMITTENT ASTHMA WITHOUT COMPLICATION: ICD-10-CM

## 2019-03-04 PROCEDURE — 99396 PREV VISIT EST AGE 40-64: CPT | Performed by: PHYSICIAN ASSISTANT

## 2019-03-04 RX ORDER — BUDESONIDE AND FORMOTEROL FUMARATE DIHYDRATE 160; 4.5 UG/1; UG/1
2 AEROSOL RESPIRATORY (INHALATION)
Qty: 10.2 G | Refills: 12 | Status: SHIPPED | OUTPATIENT
Start: 2019-03-04 | End: 2020-04-01

## 2019-03-04 NOTE — PROGRESS NOTES
Subjective   Juliet Fowler is a 49 y.o. female presents for   Chief Complaint   Patient presents with   • Annual Exam     last pap in December       History of Present Illness     Juliet is a 49-year-old female who presents for annual physical examination.  She has been 5 pounds since November 19, 2018. Scarlet sees [facility] for her pap/mammograms. Her blood pressure has been running a little high over the past few weeks.  She was seen at Urgent Care last week with 130/82.  She had biometric screening last week with 150/110 BP readings.  She gets an occasional headache and feeling a little fatigue. States that she was a little stressed.  Denied any chest pain,shortness of air,wheezing,dizziness,lightheaded or swelling of ankles.  Scarlet has had 2 episodes of strep throat since December 2018.  Bowel movements are daily without dark black tarry tools.       The following portions of the patient's history were reviewed and updated as appropriate: allergies, current medications, past family history, past medical history, past social history, past surgical history and problem list.    Review of Systems   Constitutional: Positive for fatigue.   HENT: Negative.    Eyes: Negative.    Respiratory: Negative.  Negative for cough, shortness of breath and wheezing.    Cardiovascular: Negative.  Negative for chest pain, palpitations and leg swelling.   Gastrointestinal: Negative.  Negative for constipation, diarrhea and nausea.   Endocrine: Negative.    Genitourinary: Negative.    Musculoskeletal: Negative.    Skin: Negative.    Allergic/Immunologic: Negative.    Neurological: Negative.  Negative for dizziness, light-headedness and headaches.   Hematological: Negative.    Psychiatric/Behavioral: Negative for sleep disturbance and suicidal ideas.   All other systems reviewed and are negative.        Vitals:    03/04/19 0944 03/04/19 1003   BP: 152/97 140/90   BP Location:  Left arm   Patient Position:  Sitting   Cuff Size:   "Adult   Pulse: 87    Resp: 16    Temp: 98.3 °F (36.8 °C)    SpO2: 98%    Weight: 103 kg (228 lb)    Height: 160 cm (63\")      Wt Readings from Last 3 Encounters:   03/04/19 103 kg (228 lb)   01/29/19 103 kg (226 lb)   11/19/18 101 kg (223 lb)       BP Readings from Last 3 Encounters:   03/04/19 140/90   01/29/19 128/86   11/19/18 128/72     Social History     Socioeconomic History   • Marital status:      Spouse name: Not on file   • Number of children: Not on file   • Years of education: Not on file   • Highest education level: Not on file   Social Needs   • Financial resource strain: Not on file   • Food insecurity - worry: Not on file   • Food insecurity - inability: Not on file   • Transportation needs - medical: Not on file   • Transportation needs - non-medical: Not on file   Occupational History   • Not on file   Tobacco Use   • Smoking status: Never Smoker   Substance and Sexual Activity   • Alcohol use: Not on file   • Drug use: Not on file   • Sexual activity: Not on file   Other Topics Concern   • Not on file   Social History Narrative   • Not on file       No Known Allergies    Body mass index is 40.39 kg/m².    Objective   Physical Exam   Constitutional: She is oriented to person, place, and time. Vital signs are normal. She appears well-developed and well-nourished.   HENT:   Head: Normocephalic and atraumatic.   Right Ear: Hearing, tympanic membrane, external ear and ear canal normal.   Left Ear: Hearing, tympanic membrane, external ear and ear canal normal.   Nose: Nose normal. Right sinus exhibits no maxillary sinus tenderness and no frontal sinus tenderness. Left sinus exhibits no maxillary sinus tenderness and no frontal sinus tenderness.   Mouth/Throat: Uvula is midline, oropharynx is clear and moist and mucous membranes are normal.   Eyes: Conjunctivae, EOM and lids are normal. Pupils are equal, round, and reactive to light.   Neck: Trachea normal and phonation normal. Neck supple. " Carotid bruit is not present. No edema present. No thyroid mass and no thyromegaly present.   Cardiovascular: Normal rate, regular rhythm, S1 normal, S2 normal, normal heart sounds and normal pulses.   Pulmonary/Chest: Effort normal and breath sounds normal.   Abdominal: Soft. Normal appearance, normal aorta and bowel sounds are normal. There is no hepatomegaly. There is no tenderness.   Lymphadenopathy:     She has no cervical adenopathy.   Neurological: She is alert and oriented to person, place, and time.   Reflex Scores:       Patellar reflexes are 2+ on the right side and 2+ on the left side.  Skin: Skin is warm, dry and intact. Capillary refill takes less than 2 seconds.   Psychiatric: She has a normal mood and affect. Her speech is normal and behavior is normal. Judgment and thought content normal. Cognition and memory are normal.       Assessment/Plan   Juliet was seen today for annual exam.    Diagnoses and all orders for this visit:    Annual physical exam    Hypercholesterolemia    Mild intermittent asthma without complication  -     budesonide-formoterol (SYMBICORT) 160-4.5 MCG/ACT inhaler; Inhale 2 puffs 2 (Two) Times a Day.    Chronic fatigue  -     Thyroid Panel With TSH  -     Gonzalo-Barr Virus VCA Antibody Panel  -     Vitamin D 25 Hydroxy  -     Vitamin B12    Borderline hypertension    1.  Annual physical examination with hypercholesterolemia: I reviewed her lab work that was collected  On February 25, 2019 with her at office visit today.  Cholesterol was 199, triglycerides 68, HDL 52 and LDL was 133.  I have encouraged her to eat healthier and to increase her physical activity.  2. New fatigue: Fatigue symptoms for the past 2 months.  She has had 2 exposures of strep throat during this time.  She will have a thyroid profile with TSH, Gonzalo-Barr virus, vitamin D and vitamin B12 level collected for further evaluation of her fatigue.  She will be notified of test results when completed.  Will  consider medication if blood pressure is still running high.  I have asked her to make dietary changes by decreasing her salt and sodium intake as well.  Up appointment in 3 weeks with office visit.  3.  New borderline hypertension: I have rechecked her blood pressure at office visit today and got 140/90 in left arm.  I have asked her return to office in 1 week for blood pressure check.  She will also check her blood pressure at home.    Heidi Moe PA-C    [facility]  [address] [place] [id number]  Dept: [phone]  Dept Fax: [phone]  Loc: [phone]  Loc Fax: [phone]        Orders Only on 02/22/2019   Component Date Value Ref Range Status   • Glucose 02/25/2019 91  65 - 99 mg/dL Final   • BUN 02/25/2019 8  6 - 20 mg/dL Final   • Creatinine 02/25/2019 0.73  0.57 - 1.00 mg/dL Final   • eGFR Non African Am 02/25/2019 85  >60 mL/min/1.73 Final   • eGFR African Am 02/25/2019 103  >60 mL/min/1.73 Final   • BUN/Creatinine Ratio 02/25/2019 11.0  7.0 - 25.0 Final   • Sodium 02/25/2019 137  136 - 145 mmol/L Final   • Potassium 02/25/2019 4.4  3.5 - 5.2 mmol/L Final   • Chloride 02/25/2019 100  98 - 107 mmol/L Final   • Total CO2 02/25/2019 29.0  22.0 - 29.0 mmol/L Final   • Calcium 02/25/2019 9.0  8.6 - 10.5 mg/dL Final   • Total Protein 02/25/2019 7.3  6.0 - 8.5 g/dL Final   • Albumin 02/25/2019 4.30  3.50 - 5.20 g/dL Final   • Globulin 02/25/2019 3.0  gm/dL Final   • A/G Ratio 02/25/2019 1.4  g/dL Final   • Total Bilirubin 02/25/2019 0.4  0.2 - 1.2 mg/dL Final   • Alkaline Phosphatase 02/25/2019 90  40 - 129 U/L Final   • AST (SGOT) 02/25/2019 17  5 - 32 U/L Final   • ALT (SGPT) 02/25/2019 21  5 - 33 U/L Final   • WBC 02/25/2019 9.47  3.40 - 10.80 10*3/mm3 Final   • RBC 02/25/2019 4.23  3.77 - 5.28 10*6/mm3 Final   • Hemoglobin 02/25/2019 13.6  12.0 - 15.9 g/dL Final   • Hematocrit 02/25/2019 42.2  34.0 - 46.6 % Final   • MCV  02/25/2019 99.8* 79.0 - 97.0 fL Final   • MCH 02/25/2019 32.2  26.6 - 33.0 pg Final   • MCHC 02/25/2019 32.2  31.5 - 35.7 g/dL Final   • RDW 02/25/2019 12.8  12.3 - 15.4 % Final   • Platelets 02/25/2019 338  140 - 450 10*3/mm3 Final   • Neutrophil Rel % 02/25/2019 75.7  42.7 - 76.0 % Final   • Lymphocyte Rel % 02/25/2019 15.9* 19.6 - 45.3 % Final   • Monocyte Rel % 02/25/2019 6.3  5.0 - 12.0 % Final   • Eosinophil Rel % 02/25/2019 1.0  0.3 - 6.2 % Final   • Basophil Rel % 02/25/2019 0.5  0.0 - 1.5 % Final   • Neutrophils Absolute 02/25/2019 7.16* 1.40 - 7.00 10*3/mm3 Final   • Lymphocytes Absolute 02/25/2019 1.51  0.70 - 3.10 10*3/mm3 Final   • Monocytes Absolute 02/25/2019 0.60  0.10 - 0.90 10*3/mm3 Final   • Eosinophils Absolute 02/25/2019 0.09  0.00 - 0.40 10*3/mm3 Final   • Basophils Absolute 02/25/2019 0.05  0.00 - 0.20 10*3/mm3 Final   • Immature Granulocyte Rel % 02/25/2019 0.6* 0.0 - 0.5 % Final   • Immature Grans Absolute 02/25/2019 0.06* 0.00 - 0.05 10*3/mm3 Final   • nRBC 02/25/2019 0.0  0.0 - 0.0 /100 WBC Final   • Total Cholesterol 02/25/2019 199  0 - 200 mg/dL Final   • Triglycerides 02/25/2019 68  0 - 150 mg/dL Final   • HDL Cholesterol 02/25/2019 52  40 - 60 mg/dL Final   • VLDL Cholesterol 02/25/2019 13.6  7 - 27 mg/dL Final   • LDL Cholesterol  02/25/2019 133* 0 - 100 mg/dL Final   • Chol/HDL Ratio 02/25/2019 3.83   Final

## 2019-03-05 LAB
25(OH)D3+25(OH)D2 SERPL-MCNC: 41.4 NG/ML
EBV EA IGG SER-ACNC: <9 U/ML (ref 0–8.9)
EBV NA IGG SER IA-ACNC: 30.9 U/ML (ref 0–17.9)
EBV VCA IGG SER IA-ACNC: >600 U/ML (ref 0–17.9)
EBV VCA IGM SER IA-ACNC: <36 U/ML (ref 0–35.9)
FT4I SERPL CALC-MCNC: 1.7 (ref 1.2–4.9)
SERVICE CMNT-IMP: ABNORMAL
T3RU NFR SERPL: 20 % (ref 24–39)
T4 SERPL-MCNC: 8.7 UG/DL (ref 4.5–12)
TSH SERPL DL<=0.005 MIU/L-ACNC: 1.95 UIU/ML (ref 0.45–4.5)
VIT B12 SERPL-MCNC: 313 PG/ML

## 2019-05-22 ENCOUNTER — OFFICE VISIT (OUTPATIENT)
Dept: FAMILY MEDICINE CLINIC | Facility: CLINIC | Age: 49
End: 2019-05-22

## 2019-05-22 VITALS
WEIGHT: 227 LBS | HEIGHT: 63 IN | SYSTOLIC BLOOD PRESSURE: 160 MMHG | TEMPERATURE: 98.4 F | RESPIRATION RATE: 18 BRPM | HEART RATE: 82 BPM | OXYGEN SATURATION: 98 % | BODY MASS INDEX: 40.22 KG/M2 | DIASTOLIC BLOOD PRESSURE: 90 MMHG

## 2019-05-22 DIAGNOSIS — I10 ESSENTIAL HYPERTENSION: Primary | ICD-10-CM

## 2019-05-22 PROBLEM — N89.8 VAGINAL ITCHING: Status: RESOLVED | Noted: 2017-11-30 | Resolved: 2019-05-22

## 2019-05-22 PROCEDURE — 99213 OFFICE O/P EST LOW 20 MIN: CPT | Performed by: PHYSICIAN ASSISTANT

## 2019-05-22 RX ORDER — HYDROCHLOROTHIAZIDE 25 MG/1
25 TABLET ORAL DAILY
Qty: 30 TABLET | Refills: 1 | Status: SHIPPED | OUTPATIENT
Start: 2019-05-22 | End: 2019-06-13

## 2019-05-22 RX ORDER — FLUTICASONE PROPIONATE 50 MCG
SPRAY, SUSPENSION (ML) NASAL
COMMUNITY
Start: 2019-05-19

## 2019-05-22 NOTE — PROGRESS NOTES
"Subjective   Juliet Fowler is a 49 y.o. female presents for   Chief Complaint   Patient presents with   • Hypertension     management       History of Present Illness     Scarlet is a 49-year-old female who presents for hypertension management.  She has lost 1 pound since March 4, 2019. She was seen at OB-GYN on Monday.,May 20,2019 and they told her that her blood pressure 152/106. She has checked her blood pressure at home later that day and got 172/116.   Scarlet had drink a lot of caffeine for a days prior.  She also is not drinking more water.  Denied any chest pain,shortness of air,headaches,dizziness or swelling of ankles.      The following portions of the patient's history were reviewed and updated as appropriate: allergies, current medications, past family history, past medical history, past social history, past surgical history and problem list.    Review of Systems   Constitutional: Negative.    Eyes: Negative.    Respiratory: Negative.  Negative for cough, shortness of breath and wheezing.    Cardiovascular: Negative.  Negative for chest pain, palpitations and leg swelling.   Gastrointestinal: Negative.  Negative for constipation, diarrhea, nausea and vomiting.   Endocrine: Negative.    Genitourinary: Negative.    Musculoskeletal: Positive for neck pain (occassional).   Skin: Negative.    Allergic/Immunologic: Negative.    Neurological: Positive for headaches.   Hematological: Negative.    Psychiatric/Behavioral: Negative.    All other systems reviewed and are negative.        Vitals:    05/22/19 1120 05/22/19 1152 05/22/19 1153   BP: 132/86 160/88 160/90   BP Location:  Left arm Right arm   Patient Position:  Sitting Sitting   Cuff Size:  Adult Adult   Pulse: 82     Resp: 18     Temp: 98.4 °F (36.9 °C)     SpO2: 98%     Weight: 103 kg (227 lb)     Height: 160 cm (63\")       Wt Readings from Last 3 Encounters:   05/22/19 103 kg (227 lb)   03/04/19 103 kg (228 lb)   01/29/19 103 kg (226 lb)     BP Readings from " Last 3 Encounters:   05/22/19 160/90   03/04/19 140/90   01/29/19 128/86     Social History     Socioeconomic History   • Marital status:      Spouse name: Not on file   • Number of children: Not on file   • Years of education: Not on file   • Highest education level: Not on file   Tobacco Use   • Smoking status: Never Smoker       No Known Allergies    Body mass index is 40.21 kg/m².    Objective   Physical Exam   Constitutional: She is oriented to person, place, and time. Vital signs are normal. She appears well-developed and well-nourished.   Morbid obese female   Neck: Trachea normal, normal range of motion, full passive range of motion without pain and phonation normal. Neck supple. No spinous process tenderness and no muscular tenderness present. Carotid bruit is not present. No neck rigidity. No edema, no erythema and normal range of motion present.   Cardiovascular: Normal rate, regular rhythm, S1 normal, S2 normal and normal heart sounds.   No murmur heard.  Pulmonary/Chest: Effort normal and breath sounds normal.   Abdominal: Soft. Normal appearance, normal aorta and bowel sounds are normal. There is no hepatomegaly. There is no tenderness.   Neurological: She is alert and oriented to person, place, and time.   Skin: Skin is warm, dry and intact. Capillary refill takes less than 2 seconds.   Psychiatric: She has a normal mood and affect. Her speech is normal and behavior is normal. Judgment and thought content normal. Cognition and memory are normal.       Assessment/Plan   Juliet was seen today for hypertension.    Diagnoses and all orders for this visit:    Essential hypertension  -     hydrochlorothiazide (HYDRODIURIL) 25 MG tablet; Take 1 tablet by mouth Daily.      Mrs. Scarlet Hung was seen in office today with new onset hypertension.  I have rechecked blood pressure at office visit today and got 160/88 in left arm and 160/90 in right arm.  We will start hydrochlorothiazide 25 mg once daily.   The prescription was sent to pharmacy.  I have asked Scarlet to keep a blood pressure log at home.  She will return to office in the next 2-3 weeks for reevaluation.  She voiced understanding.    [facility]  [address] [place] [id number]  Dept: [phone]  Dept Fax: [phone]  Loc: [phone]  Loc Fax: [phone]

## 2019-06-13 ENCOUNTER — OFFICE VISIT (OUTPATIENT)
Dept: FAMILY MEDICINE CLINIC | Facility: CLINIC | Age: 49
End: 2019-06-13

## 2019-06-13 VITALS
SYSTOLIC BLOOD PRESSURE: 140 MMHG | TEMPERATURE: 98.6 F | OXYGEN SATURATION: 94 % | RESPIRATION RATE: 16 BRPM | DIASTOLIC BLOOD PRESSURE: 88 MMHG | WEIGHT: 226 LBS | HEART RATE: 104 BPM | BODY MASS INDEX: 40.04 KG/M2 | HEIGHT: 63 IN

## 2019-06-13 DIAGNOSIS — I10 ESSENTIAL HYPERTENSION: Primary | ICD-10-CM

## 2019-06-13 PROCEDURE — 99213 OFFICE O/P EST LOW 20 MIN: CPT | Performed by: PHYSICIAN ASSISTANT

## 2019-06-13 RX ORDER — LISINOPRIL AND HYDROCHLOROTHIAZIDE 12.5; 1 MG/1; MG/1
1 TABLET ORAL DAILY
Qty: 30 TABLET | Refills: 0 | Status: SHIPPED | OUTPATIENT
Start: 2019-06-13 | End: 2019-07-18

## 2019-06-13 NOTE — PROGRESS NOTES
"Subjective   Juliet Fowler is a 49 y.o. female presents for   Chief Complaint   Patient presents with   • Hypertension     management       History of Present Illness     Scarlet is a 49-year-old female who presents for hypertension management.  She has lost 1 pound since May 22, 2019.  She was seen last month and started on hydrochlorothiazide 25 mg daily.  Her blood pressure has been running 120-158/. She forgets to take her medication sometimes.  Diet has been slightly healthy.  Sleep has been normal.  Denied any chest pain,shortness of air,wheezing,swelling of ankles or headaches.  Has not been very healthy lately.  Sleep has been normal.       The following portions of the patient's history were reviewed and updated as appropriate: allergies, current medications, past family history, past medical history, past social history, past surgical history and problem list.    Review of Systems   Constitutional: Negative.    HENT: Negative.    Eyes: Negative.    Respiratory: Negative.  Negative for cough, chest tightness, shortness of breath and wheezing.    Cardiovascular: Negative.  Negative for chest pain, palpitations and leg swelling.   Gastrointestinal: Negative.    Endocrine: Negative.    Genitourinary: Negative.    Musculoskeletal: Negative.  Negative for neck pain.   Skin: Negative.    Allergic/Immunologic: Negative.    Neurological: Negative.  Negative for dizziness, light-headedness and headaches.   Hematological: Negative.    Psychiatric/Behavioral: Negative.    All other systems reviewed and are negative.        Vitals:    06/13/19 1504 06/13/19 1519   BP: 140/94 140/88   BP Location:  Left arm   Patient Position:  Sitting   Cuff Size:  Adult   Pulse: 104    Resp: 16    Temp: 98.6 °F (37 °C)    SpO2: 94%    Weight: 103 kg (226 lb)    Height: 160 cm (63\")      Wt Readings from Last 3 Encounters:   06/13/19 103 kg (226 lb)   05/22/19 103 kg (227 lb)   03/04/19 103 kg (228 lb)     BP Readings from Last 3 " Encounters:   06/13/19 140/88   05/22/19 160/90   03/04/19 140/90     Social History     Socioeconomic History   • Marital status:      Spouse name: Not on file   • Number of children: Not on file   • Years of education: Not on file   • Highest education level: Not on file   Tobacco Use   • Smoking status: Never Smoker       No Known Allergies    Body mass index is 40.03 kg/m².    Objective   Physical Exam   Constitutional: She is oriented to person, place, and time. Vital signs are normal. She appears well-developed and well-nourished.   Neck: Trachea normal and phonation normal. Neck supple. Carotid bruit is not present. No edema present. No thyroid mass and no thyromegaly present.   Cardiovascular: Normal rate, regular rhythm, S1 normal, S2 normal, normal heart sounds and normal pulses.   Pulmonary/Chest: Effort normal and breath sounds normal.   Abdominal: Soft. Normal appearance, normal aorta and bowel sounds are normal. There is no hepatomegaly. There is no tenderness.   Neurological: She is alert and oriented to person, place, and time.   Skin: Skin is warm, dry and intact. Capillary refill takes less than 2 seconds.   Psychiatric: She has a normal mood and affect. Her speech is normal and behavior is normal. Judgment and thought content normal. Cognition and memory are normal.       Assessment/Plan   Juliet was seen today for hypertension.    Diagnoses and all orders for this visit:    Essential hypertension  -     lisinopril-hydrochlorothiazide (PRINZIDE,ZESTORETIC) 10-12.5 MG per tablet; Take 1 tablet by mouth Daily.    Ms. Juliet Fowler was seen in office today for chronic and uncontrolled hypertension: I have reviewed her blood pressure log from home.  I have rechecked blood pressure at office visit today and got 140/88 in left arm.  We will stop the hydrochlorothiazide medication due to not helping with her hypertension.  We will send to pharmacy lisinopril hydrochlorothiazide 10-12.5 mg daily.   I have asked her to schedule follow-up appointment in the next 3-4 weeks for reevaluation.  I have asked her to decrease her and sodium intake.  She will drink more water and be more physically active.  She voiced understanding.      [facility]  [address] [place] [id number]  Dept: [phone]  Dept Fax: [phone]  Loc: [phone]  Loc Fax: [phone]

## 2019-06-14 NOTE — PROGRESS NOTES
I have reviewed the notes, assessments, and/or procedures performed by Heidi AMIN, I concur with her/his documentation of Juliet Fowler.

## 2019-07-18 ENCOUNTER — OFFICE VISIT (OUTPATIENT)
Dept: FAMILY MEDICINE CLINIC | Facility: CLINIC | Age: 49
End: 2019-07-18

## 2019-07-18 VITALS
DIASTOLIC BLOOD PRESSURE: 78 MMHG | BODY MASS INDEX: 39.69 KG/M2 | SYSTOLIC BLOOD PRESSURE: 126 MMHG | WEIGHT: 224 LBS | HEIGHT: 63 IN | OXYGEN SATURATION: 96 % | HEART RATE: 94 BPM | TEMPERATURE: 97.9 F | RESPIRATION RATE: 15 BRPM

## 2019-07-18 DIAGNOSIS — R12 HEARTBURN: ICD-10-CM

## 2019-07-18 DIAGNOSIS — E66.9 OBESITY (BMI 30-39.9): ICD-10-CM

## 2019-07-18 DIAGNOSIS — I10 ESSENTIAL HYPERTENSION: Primary | ICD-10-CM

## 2019-07-18 PROCEDURE — 99213 OFFICE O/P EST LOW 20 MIN: CPT | Performed by: PHYSICIAN ASSISTANT

## 2019-07-18 RX ORDER — LISINOPRIL AND HYDROCHLOROTHIAZIDE 12.5; 1 MG/1; MG/1
1 TABLET ORAL DAILY
Qty: 30 TABLET | Refills: 6 | Status: SHIPPED | OUTPATIENT
Start: 2019-07-18 | End: 2019-07-24 | Stop reason: SINTOL

## 2019-07-18 RX ORDER — OMEPRAZOLE 40 MG/1
40 CAPSULE, DELAYED RELEASE ORAL DAILY
Qty: 30 CAPSULE | Refills: 6 | Status: SHIPPED | OUTPATIENT
Start: 2019-07-18 | End: 2020-04-01

## 2019-07-18 NOTE — PROGRESS NOTES
"Subjective   Juliet Fowler is a 49 y.o. female presents for   Chief Complaint   Patient presents with   • Hypertension     management       History of Present Illness     Scarlet is a 49-year-old female who presents for hypertension management.  She has lost 2 pounds since June 13, 2019.  Currently taking lisinopril hydrochlorothiazide medication 10/12.5 mg daily. Scarlet has been checking her blood pressure at home and has been getting 120-130/70-80's.  Denied any chest pain,shortness of air,wheezing,headaches or swelling of ankles.  Diet has been so/so healthy.  She has been trying to ea more vegetables.  Sleep has been normal.  Scarlet has not been exercising.  Needs a refill on her Metrazol medication.  States this helps with her heartburn.    The following portions of the patient's history were reviewed and updated as appropriate: allergies, current medications, past family history, past medical history, past social history, past surgical history and problem list.    Review of Systems   Constitutional: Negative.    HENT: Negative.    Eyes: Negative.    Respiratory: Negative.  Negative for chest tightness, shortness of breath, wheezing and stridor.    Cardiovascular: Negative.  Negative for chest pain, palpitations and leg swelling.   Gastrointestinal: Negative.    Endocrine: Negative.    Genitourinary: Negative.    Musculoskeletal: Negative.  Negative for neck pain.   Skin: Negative.    Allergic/Immunologic: Negative.    Neurological: Negative.  Negative for dizziness, light-headedness and headaches.   Hematological: Negative.    Psychiatric/Behavioral: Negative.    All other systems reviewed and are negative.        Vitals:    07/18/19 0839   BP: 126/78   Pulse: 94   Resp: 15   Temp: 97.9 °F (36.6 °C)   SpO2: 96%   Weight: 102 kg (224 lb)   Height: 160 cm (63\")     Wt Readings from Last 3 Encounters:   07/18/19 102 kg (224 lb)   06/13/19 103 kg (226 lb)   05/22/19 103 kg (227 lb)     BP Readings from Last 3 Encounters: "   07/18/19 126/78   06/13/19 140/88   05/22/19 160/90     Social History     Socioeconomic History   • Marital status:      Spouse name: Not on file   • Number of children: Not on file   • Years of education: Not on file   • Highest education level: Not on file   Tobacco Use   • Smoking status: Never Smoker       No Known Allergies    Body mass index is 39.68 kg/m².    Objective   Physical Exam   Constitutional: She is oriented to person, place, and time. Vital signs are normal. She appears well-developed and well-nourished.   Obese female   Neck: Trachea normal and phonation normal. Neck supple. Carotid bruit is not present. No edema present. No thyroid mass and no thyromegaly present.   Cardiovascular: Normal rate, regular rhythm, S1 normal, S2 normal, normal heart sounds and normal pulses.   No murmur heard.  Pulmonary/Chest: Effort normal and breath sounds normal.   Abdominal: Soft. Normal appearance, normal aorta and bowel sounds are normal. There is no hepatomegaly. There is no tenderness.   Neurological: She is alert and oriented to person, place, and time.   Skin: Skin is warm, dry and intact. Capillary refill takes less than 2 seconds.   Psychiatric: She has a normal mood and affect. Her speech is normal and behavior is normal. Judgment and thought content normal. Cognition and memory are normal.       Assessment/Plan   Juliet was seen today for hypertension.    Diagnoses and all orders for this visit:    Essential hypertension  -     lisinopril-hydrochlorothiazide (PRINZIDE,ZESTORETIC) 10-12.5 MG per tablet; Take 1 tablet by mouth Daily.    Heartburn  -     omeprazole (priLOSEC) 40 MG capsule; Take 1 capsule by mouth Daily.    Obesity (BMI 30-39.9)    1.  Chronic and stable hypertension: I have rechecked her blood pressure at office visit today and got 120/78 in left arm.  Doing well with the lisinopril/hydrochlorothiazide medication.  I have sent a refill to pharmacy.  Scarlet will return to office in  November for reevaluation.  2.  Chronic and stable heartburn: Doing well with the generic Omeprazole medication.  I have sent a refill to pharmacy.  Will reevaluate at next office visit.  3.  Chronic obesity: She has lost 2 pounds since June 13, 2019.  I have asked her to increase her physical activity and to continue to eat healthy.  Will reevaluate weight at next office visit in November.    [facility]  [address] [place] [id number]  Dept: [phone]  Dept Fax: [phone]  Loc: [phone]  Loc Fax: [phone]

## 2019-07-24 DIAGNOSIS — I10 ESSENTIAL HYPERTENSION: Primary | ICD-10-CM

## 2019-07-24 RX ORDER — VALSARTAN AND HYDROCHLOROTHIAZIDE 80; 12.5 MG/1; MG/1
1 TABLET, FILM COATED ORAL DAILY
Qty: 30 TABLET | Refills: 1 | Status: SHIPPED | OUTPATIENT
Start: 2019-07-24 | End: 2019-09-14 | Stop reason: SDUPTHER

## 2019-08-06 ENCOUNTER — CLINICAL SUPPORT (OUTPATIENT)
Dept: FAMILY MEDICINE CLINIC | Facility: CLINIC | Age: 49
End: 2019-08-06

## 2019-08-06 ENCOUNTER — TELEPHONE (OUTPATIENT)
Dept: FAMILY MEDICINE CLINIC | Facility: CLINIC | Age: 49
End: 2019-08-06

## 2019-08-06 VITALS — HEART RATE: 98 BPM | DIASTOLIC BLOOD PRESSURE: 80 MMHG | SYSTOLIC BLOOD PRESSURE: 116 MMHG

## 2019-09-14 DIAGNOSIS — I10 ESSENTIAL HYPERTENSION: ICD-10-CM

## 2019-09-15 RX ORDER — VALSARTAN AND HYDROCHLOROTHIAZIDE 80; 12.5 MG/1; MG/1
1 TABLET, FILM COATED ORAL DAILY
Qty: 30 TABLET | Refills: 3 | Status: SHIPPED | OUTPATIENT
Start: 2019-09-15 | End: 2020-02-27

## 2019-09-23 ENCOUNTER — TRANSCRIBE ORDERS (OUTPATIENT)
Dept: ADMINISTRATIVE | Facility: HOSPITAL | Age: 49
End: 2019-09-23

## 2019-09-23 DIAGNOSIS — Z12.39 SCREENING BREAST EXAMINATION: Primary | ICD-10-CM

## 2019-10-01 ENCOUNTER — HOSPITAL ENCOUNTER (OUTPATIENT)
Dept: MAMMOGRAPHY | Facility: HOSPITAL | Age: 49
Discharge: HOME | End: 2019-10-01
Admitting: PHYSICIAN ASSISTANT

## 2019-10-01 DIAGNOSIS — Z12.39 SCREENING BREAST EXAMINATION: ICD-10-CM

## 2019-10-01 PROCEDURE — 77067 SCR MAMMO BI INCL CAD: CPT

## 2019-10-01 PROCEDURE — 77063 BREAST TOMOSYNTHESIS BI: CPT

## 2019-10-25 ENCOUNTER — OFFICE VISIT (OUTPATIENT)
Dept: FAMILY MEDICINE CLINIC | Facility: CLINIC | Age: 49
End: 2019-10-25

## 2019-10-25 VITALS
TEMPERATURE: 98 F | RESPIRATION RATE: 16 BRPM | BODY MASS INDEX: 40.22 KG/M2 | WEIGHT: 227 LBS | DIASTOLIC BLOOD PRESSURE: 88 MMHG | SYSTOLIC BLOOD PRESSURE: 128 MMHG | HEIGHT: 63 IN | OXYGEN SATURATION: 98 % | HEART RATE: 86 BPM

## 2019-10-25 DIAGNOSIS — J06.9 ACUTE URI: Primary | ICD-10-CM

## 2019-10-25 PROCEDURE — 99213 OFFICE O/P EST LOW 20 MIN: CPT | Performed by: PHYSICIAN ASSISTANT

## 2019-10-25 RX ORDER — AMOXICILLIN 875 MG/1
875 TABLET, COATED ORAL 2 TIMES DAILY
Qty: 20 TABLET | Refills: 0 | Status: SHIPPED | OUTPATIENT
Start: 2019-10-25 | End: 2019-11-04

## 2019-10-25 RX ORDER — METHYLPREDNISOLONE 4 MG/1
TABLET ORAL
Qty: 21 TABLET | Refills: 0 | Status: SHIPPED | OUTPATIENT
Start: 2019-10-25 | End: 2020-02-03

## 2019-10-25 RX ORDER — BENZONATATE 200 MG/1
200 CAPSULE ORAL 3 TIMES DAILY PRN
Qty: 30 CAPSULE | Refills: 0 | Status: SHIPPED | OUTPATIENT
Start: 2019-10-25 | End: 2020-02-03

## 2019-10-25 NOTE — PROGRESS NOTES
"Subjective   Juliet Fowler is a 49 y.o. female presents for   Chief Complaint   Patient presents with   • Nasal Congestion   • Headache   • Earache   • Sore Throat   • Cough       History of Present Illness   Scarlet is a 49-year-old female who presents with nasal congestion, headache, right ear pain/pressure, sore throat,post nasal drip,green productive cough,stuffy nose,and slight wheezing in the morning.  Her symptoms started for the past week.  Denied any fevers,chills,nausea,vomiting or diarrhea.  Appetite has been normal.  Sleep has been restless due to coughing.  She has taken OTC Coricidin HBP without relief.     The following portions of the patient's history were reviewed and updated as appropriate: allergies, current medications, past family history, past medical history, past social history, past surgical history and problem list.    Review of Systems   Constitutional: Negative.  Negative for chills, fatigue and fever.   HENT: Positive for congestion, ear pain, postnasal drip, sinus pressure, sinus pain and sore throat. Negative for rhinorrhea.    Eyes: Negative.    Respiratory: Positive for choking and wheezing. Negative for shortness of breath.    Cardiovascular: Negative.  Negative for chest pain, palpitations and leg swelling.   Gastrointestinal: Negative.  Negative for abdominal distention, constipation, diarrhea, nausea and vomiting.   Endocrine: Negative.    Genitourinary: Negative.    Musculoskeletal: Negative.  Negative for myalgias.   Skin: Negative.    Allergic/Immunologic: Negative.    Neurological: Positive for headaches. Negative for dizziness and light-headedness.   Hematological: Negative.    Psychiatric/Behavioral: Positive for sleep disturbance.   All other systems reviewed and are negative.        Vitals:    10/25/19 0825   BP: 128/88   Pulse: 86   Resp: 16   Temp: 98 °F (36.7 °C)   SpO2: 98%   Weight: 103 kg (227 lb)   Height: 160 cm (63\")     Wt Readings from Last 3 Encounters: "   10/25/19 103 kg (227 lb)   07/18/19 102 kg (224 lb)   06/13/19 103 kg (226 lb)     BP Readings from Last 3 Encounters:   10/25/19 128/88   08/06/19 116/80   07/18/19 126/78     Social History     Socioeconomic History   • Marital status:      Spouse name: Not on file   • Number of children: Not on file   • Years of education: Not on file   • Highest education level: Not on file   Tobacco Use   • Smoking status: Never Smoker       Allergies   Allergen Reactions   • Dust Mite Extract Unknown (See Comments)   • Bird Grass Unknown (See Comments)   • Molds & Smuts Unknown (See Comments)   • Lisinopril Cough       Body mass index is 40.21 kg/m².    Objective   Physical Exam   Constitutional: She is oriented to person, place, and time. Vital signs are normal. She appears well-developed and well-nourished.   Morbid obese female   HENT:   Head: Normocephalic and atraumatic.   Right Ear: Hearing, external ear and ear canal normal. Tympanic membrane is bulging.   Left Ear: Hearing, external ear and ear canal normal. Tympanic membrane is bulging.   Nose: Nose normal. Right sinus exhibits no maxillary sinus tenderness and no frontal sinus tenderness. Left sinus exhibits no maxillary sinus tenderness and no frontal sinus tenderness.   Mouth/Throat: Uvula is midline and mucous membranes are normal.   1+ clear postnasal drainage noted   Eyes: Conjunctivae, EOM and lids are normal. Pupils are equal, round, and reactive to light.   Neck: Trachea normal and phonation normal. Neck supple. No edema present.   Cardiovascular: Normal rate, regular rhythm, S1 normal, S2 normal, normal heart sounds and normal pulses.   No murmur heard.  Pulmonary/Chest: Effort normal and breath sounds normal.   Abdominal: Soft. Normal appearance, normal aorta and bowel sounds are normal. There is no hepatomegaly. There is no tenderness.   Lymphadenopathy:     She has no cervical adenopathy.   Neurological: She is alert and oriented to person,  place, and time.   Skin: Skin is warm, dry and intact. Capillary refill takes less than 2 seconds.   Psychiatric: She has a normal mood and affect. Her speech is normal and behavior is normal. Judgment and thought content normal. Cognition and memory are normal.       Assessment/Plan   Juliet was seen today for nasal congestion, headache, earache, sore throat and cough.    Diagnoses and all orders for this visit:    Acute URI  -     methylPREDNISolone (MEDROL, TRAM,) 4 MG tablet; Take as directed on package instructions.  -     amoxicillin (AMOXIL) 875 MG tablet; Take 1 tablet by mouth 2 (Two) Times a Day for 10 days.  -     benzonatate (TESSALON) 200 MG capsule; Take 1 capsule by mouth 3 (Three) Times a Day As Needed for Cough.    Mrs. Scarlet Hung was seen in office and was diagnosed with new onset acute upper respiratory infection.  I have prescribed Tessalon Perles, Medrol Dosepak and amoxicillin antibiotic.  She will continue her Coricidin medication at home.  Pain was instructed to increase her fluid intake and rest as much as possible.  She may use over-the-counter Vicks Vapo Rub on chest and bottom of feet when she sleeps.  She will return to office if symptoms do not improve.      [facility]  [address] [place] [id number]  Dept: [phone]  Dept Fax: [phone]  Loc: [phone]  Loc Fax: [phone]

## 2019-10-28 ENCOUNTER — TELEPHONE (OUTPATIENT)
Dept: FAMILY MEDICINE CLINIC | Facility: CLINIC | Age: 49
End: 2019-10-28

## 2019-10-28 DIAGNOSIS — N89.8 VAGINA ITCHING: Primary | ICD-10-CM

## 2019-10-28 RX ORDER — FLUCONAZOLE 150 MG/1
150 TABLET ORAL ONCE
Qty: 1 TABLET | Refills: 0 | Status: SHIPPED | OUTPATIENT
Start: 2019-10-28 | End: 2019-10-28

## 2019-10-28 NOTE — TELEPHONE ENCOUNTER
Regarding: Visit Follow-Up Question  Contact: [phone]  ----- Message from [facility], Generic sent at 10/28/2019 10:29 AM EDT -----    I am starting to feel much better.  However, I didn't think about the side effects of the medications and know that I will more than likely get a yeast infection.  That's what usually happens every time I get antibiotics.  Can you possibly send a prescription to the [facility] pharmacy in [place] so that I can avoid having that issue?    Thank you for all that you do.    Scarlet Fowler

## 2019-11-15 DIAGNOSIS — Z00.00 ANNUAL PHYSICAL EXAM: Primary | ICD-10-CM

## 2019-11-15 DIAGNOSIS — R00.2 HEART PALPITATIONS: ICD-10-CM

## 2019-11-19 LAB
ALBUMIN SERPL-MCNC: 4.3 G/DL (ref 3.5–5.2)
ALBUMIN/GLOB SERPL: 1.6 G/DL
ALP SERPL-CCNC: 77 U/L (ref 39–117)
ALT SERPL-CCNC: 18 U/L (ref 1–33)
AST SERPL-CCNC: 13 U/L (ref 1–32)
BASOPHILS # BLD AUTO: 0.02 10*3/MM3 (ref 0–0.2)
BASOPHILS NFR BLD AUTO: 0.2 % (ref 0–1.5)
BILIRUB SERPL-MCNC: 0.4 MG/DL (ref 0.2–1.2)
BUN SERPL-MCNC: 10 MG/DL (ref 6–20)
BUN/CREAT SERPL: 12.2 (ref 7–25)
CALCIUM SERPL-MCNC: 9.2 MG/DL (ref 8.6–10.5)
CHLORIDE SERPL-SCNC: 105 MMOL/L (ref 98–107)
CHOLEST SERPL-MCNC: 187 MG/DL (ref 0–200)
CHOLEST/HDLC SERPL: 4.68 {RATIO}
CO2 SERPL-SCNC: 26.8 MMOL/L (ref 22–29)
CREAT SERPL-MCNC: 0.82 MG/DL (ref 0.57–1)
EOSINOPHIL # BLD AUTO: 0.05 10*3/MM3 (ref 0–0.4)
EOSINOPHIL NFR BLD AUTO: 0.6 % (ref 0.3–6.2)
ERYTHROCYTE [DISTWIDTH] IN BLOOD BY AUTOMATED COUNT: 12.1 % (ref 12.3–15.4)
FT4I SERPL CALC-MCNC: 2 (ref 1.2–4.9)
GLOBULIN SER CALC-MCNC: 2.7 GM/DL
GLUCOSE SERPL-MCNC: 103 MG/DL (ref 65–99)
HCT VFR BLD AUTO: 37.6 % (ref 34–46.6)
HDLC SERPL-MCNC: 40 MG/DL (ref 40–60)
HGB BLD-MCNC: 12.6 G/DL (ref 12–15.9)
IMM GRANULOCYTES # BLD AUTO: 0.04 10*3/MM3 (ref 0–0.05)
IMM GRANULOCYTES NFR BLD AUTO: 0.5 % (ref 0–0.5)
LDLC SERPL CALC-MCNC: 127 MG/DL (ref 0–100)
LYMPHOCYTES # BLD AUTO: 1.05 10*3/MM3 (ref 0.7–3.1)
LYMPHOCYTES NFR BLD AUTO: 12.2 % (ref 19.6–45.3)
MCH RBC QN AUTO: 33 PG (ref 26.6–33)
MCHC RBC AUTO-ENTMCNC: 33.5 G/DL (ref 31.5–35.7)
MCV RBC AUTO: 98.4 FL (ref 79–97)
MONOCYTES # BLD AUTO: 0.47 10*3/MM3 (ref 0.1–0.9)
MONOCYTES NFR BLD AUTO: 5.5 % (ref 5–12)
NEUTROPHILS # BLD AUTO: 6.97 10*3/MM3 (ref 1.7–7)
NEUTROPHILS NFR BLD AUTO: 81 % (ref 42.7–76)
NRBC BLD AUTO-RTO: 0 /100 WBC (ref 0–0.2)
PLATELET # BLD AUTO: 359 10*3/MM3 (ref 140–450)
POTASSIUM SERPL-SCNC: 4.2 MMOL/L (ref 3.5–5.2)
PROT SERPL-MCNC: 7 G/DL (ref 6–8.5)
RBC # BLD AUTO: 3.82 10*6/MM3 (ref 3.77–5.28)
SODIUM SERPL-SCNC: 143 MMOL/L (ref 136–145)
T3RU NFR SERPL: 24 % (ref 24–39)
T4 SERPL-MCNC: 8.4 UG/DL (ref 4.5–12)
TRIGL SERPL-MCNC: 98 MG/DL (ref 0–150)
TSH SERPL DL<=0.005 MIU/L-ACNC: 1.77 UIU/ML (ref 0.45–4.5)
VLDLC SERPL CALC-MCNC: 19.6 MG/DL
WBC # BLD AUTO: 8.6 10*3/MM3 (ref 3.4–10.8)

## 2019-12-02 ENCOUNTER — OFFICE VISIT (OUTPATIENT)
Dept: FAMILY MEDICINE CLINIC | Facility: CLINIC | Age: 49
End: 2019-12-02

## 2019-12-02 VITALS
RESPIRATION RATE: 16 BRPM | BODY MASS INDEX: 40.57 KG/M2 | OXYGEN SATURATION: 98 % | TEMPERATURE: 98.2 F | HEART RATE: 93 BPM | DIASTOLIC BLOOD PRESSURE: 68 MMHG | SYSTOLIC BLOOD PRESSURE: 110 MMHG | WEIGHT: 229 LBS | HEIGHT: 63 IN

## 2019-12-02 DIAGNOSIS — I10 ESSENTIAL HYPERTENSION: ICD-10-CM

## 2019-12-02 DIAGNOSIS — Z23 NEED FOR INFLUENZA VACCINATION: ICD-10-CM

## 2019-12-02 DIAGNOSIS — Z23 NEED FOR TDAP VACCINATION: ICD-10-CM

## 2019-12-02 DIAGNOSIS — E78.00 HYPERCHOLESTEROLEMIA: Primary | ICD-10-CM

## 2019-12-02 PROCEDURE — 90472 IMMUNIZATION ADMIN EACH ADD: CPT | Performed by: PHYSICIAN ASSISTANT

## 2019-12-02 PROCEDURE — 90674 CCIIV4 VAC NO PRSV 0.5 ML IM: CPT | Performed by: PHYSICIAN ASSISTANT

## 2019-12-02 PROCEDURE — 90715 TDAP VACCINE 7 YRS/> IM: CPT | Performed by: PHYSICIAN ASSISTANT

## 2019-12-02 PROCEDURE — 99213 OFFICE O/P EST LOW 20 MIN: CPT | Performed by: PHYSICIAN ASSISTANT

## 2019-12-02 PROCEDURE — 90471 IMMUNIZATION ADMIN: CPT | Performed by: PHYSICIAN ASSISTANT

## 2019-12-02 NOTE — PROGRESS NOTES
"Subjective   Juliet Fowler is a 49 y.o. female presents for   Chief Complaint   Patient presents with   • Hypertension     management       History of Present Illness     Scarlet is a 49-year-old female who presents for hypertension management.  She has gained 2 pounds since October 25, 2019.  She has been taking valsartan/hydrochlorothiazide 80/12.5 mg daily.  Denied any chest pain, shortness of air, dizziness, vision changes, fevers, chills, upper respiratory symptoms or swelling of ankles.  Her appetite is improving.  She has been cutting back on carbohydrates.  Sleep has been normal.  She has joined [employer] and will try to go at least 3 times weekly.  Has no acute complaints at office visit today.       The following portions of the patient's history were reviewed and updated as appropriate: allergies, current medications, past family history, past medical history, past social history, past surgical history and problem list.    Review of Systems   Constitutional: Negative.  Negative for chills, diaphoresis and fever.   HENT: Negative.    Eyes: Negative.    Respiratory: Negative.  Negative for cough, shortness of breath and wheezing.    Cardiovascular: Negative.  Negative for chest pain, palpitations and leg swelling.   Gastrointestinal: Negative.    Endocrine: Negative.    Genitourinary: Negative.    Musculoskeletal: Negative.    Skin: Negative.    Allergic/Immunologic: Negative.    Neurological: Negative.  Negative for dizziness, light-headedness and headaches.   Hematological: Negative.    Psychiatric/Behavioral: Negative.  Negative for sleep disturbance.   All other systems reviewed and are negative.        Vitals:    12/02/19 0908 12/02/19 0928   BP: 128/84 110/68   BP Location:  Left arm   Patient Position:  Sitting   Cuff Size:  Adult   Pulse: 93    Resp: 16    Temp: 98.2 °F (36.8 °C)    SpO2: 98%    Weight: 104 kg (229 lb)    Height: 160 cm (63\")      Wt Readings from Last 3 Encounters:   12/02/19 " 104 kg (229 lb)   10/25/19 103 kg (227 lb)   07/18/19 102 kg (224 lb)     BP Readings from Last 3 Encounters:   12/02/19 110/68   10/25/19 128/88   08/06/19 116/80     Social History     Socioeconomic History   • Marital status:      Spouse name: Not on file   • Number of children: Not on file   • Years of education: Not on file   • Highest education level: Not on file   Tobacco Use   • Smoking status: Never Smoker   • Smokeless tobacco: Never Used       Allergies   Allergen Reactions   • Dust Mite Extract Unknown (See Comments)   • Bird Grass Unknown (See Comments)   • Molds & Smuts Unknown (See Comments)   • Lisinopril Cough       Body mass index is 40.57 kg/m².    Objective   Physical Exam   Constitutional: She is oriented to person, place, and time. Vital signs are normal. She appears well-developed and well-nourished.   Morbid obese female   Neck: Trachea normal and phonation normal. Neck supple. Normal carotid pulses, no hepatojugular reflux and no JVD present. No tracheal tenderness present. Carotid bruit is not present. No tracheal deviation and no edema present. No thyroid mass and no thyromegaly present.   Cardiovascular: Normal rate, regular rhythm, S1 normal, S2 normal, normal heart sounds and normal pulses.   No murmur heard.  Pulmonary/Chest: Effort normal and breath sounds normal.   Abdominal: Soft. Normal appearance, normal aorta and bowel sounds are normal. There is no hepatomegaly. There is no tenderness.   Neurological: She is alert and oriented to person, place, and time.   Skin: Skin is warm, dry and intact. Capillary refill takes less than 2 seconds.   Psychiatric: She has a normal mood and affect. Her speech is normal and behavior is normal. Judgment and thought content normal. Cognition and memory are normal.         No visits with results within 2 Week(s) from this visit.   Latest known visit with results is:   Orders Only on 11/15/2019   Component Date Value Ref Range Status   •  Glucose 11/18/2019 103* 65 - 99 mg/dL Final   • BUN 11/18/2019 10  6 - 20 mg/dL Final   • Creatinine 11/18/2019 0.82  0.57 - 1.00 mg/dL Final   • eGFR Non  Am 11/18/2019 74  >60 mL/min/1.73 Final   • eGFR African Am 11/18/2019 90  >60 mL/min/1.73 Final   • BUN/Creatinine Ratio 11/18/2019 12.2  7.0 - 25.0 Final   • Sodium 11/18/2019 143  136 - 145 mmol/L Final   • Potassium 11/18/2019 4.2  3.5 - 5.2 mmol/L Final   • Chloride 11/18/2019 105  98 - 107 mmol/L Final   • Total CO2 11/18/2019 26.8  22.0 - 29.0 mmol/L Final   • Calcium 11/18/2019 9.2  8.6 - 10.5 mg/dL Final   • Total Protein 11/18/2019 7.0  6.0 - 8.5 g/dL Final   • Albumin 11/18/2019 4.30  3.50 - 5.20 g/dL Final   • Globulin 11/18/2019 2.7  gm/dL Final   • A/G Ratio 11/18/2019 1.6  g/dL Final   • Total Bilirubin 11/18/2019 0.4  0.2 - 1.2 mg/dL Final   • Alkaline Phosphatase 11/18/2019 77  39 - 117 U/L Final   • AST (SGOT) 11/18/2019 13  1 - 32 U/L Final   • ALT (SGPT) 11/18/2019 18  1 - 33 U/L Final   • WBC 11/18/2019 8.60  3.40 - 10.80 10*3/mm3 Final   • RBC 11/18/2019 3.82  3.77 - 5.28 10*6/mm3 Final   • Hemoglobin 11/18/2019 12.6  12.0 - 15.9 g/dL Final   • Hematocrit 11/18/2019 37.6  34.0 - 46.6 % Final   • MCV 11/18/2019 98.4* 79.0 - 97.0 fL Final   • MCH 11/18/2019 33.0  26.6 - 33.0 pg Final   • MCHC 11/18/2019 33.5  31.5 - 35.7 g/dL Final   • RDW 11/18/2019 12.1* 12.3 - 15.4 % Final   • Platelets 11/18/2019 359  140 - 450 10*3/mm3 Final   • Neutrophil Rel % 11/18/2019 81.0* 42.7 - 76.0 % Final   • Lymphocyte Rel % 11/18/2019 12.2* 19.6 - 45.3 % Final   • Monocyte Rel % 11/18/2019 5.5  5.0 - 12.0 % Final   • Eosinophil Rel % 11/18/2019 0.6  0.3 - 6.2 % Final   • Basophil Rel % 11/18/2019 0.2  0.0 - 1.5 % Final   • Neutrophils Absolute 11/18/2019 6.97  1.70 - 7.00 10*3/mm3 Final   • Lymphocytes Absolute 11/18/2019 1.05  0.70 - 3.10 10*3/mm3 Final   • Monocytes Absolute 11/18/2019 0.47  0.10 - 0.90 10*3/mm3 Final   • Eosinophils Absolute  11/18/2019 0.05  0.00 - 0.40 10*3/mm3 Final   • Basophils Absolute 11/18/2019 0.02  0.00 - 0.20 10*3/mm3 Final   • Immature Granulocyte Rel % 11/18/2019 0.5  0.0 - 0.5 % Final   • Immature Grans Absolute 11/18/2019 0.04  0.00 - 0.05 10*3/mm3 Final   • nRBC 11/18/2019 0.0  0.0 - 0.2 /100 WBC Final   • Total Cholesterol 11/18/2019 187  0 - 200 mg/dL Final   • Triglycerides 11/18/2019 98  0 - 150 mg/dL Final   • HDL Cholesterol 11/18/2019 40  40 - 60 mg/dL Final   • VLDL Cholesterol 11/18/2019 19.6  mg/dL Final   • LDL Cholesterol  11/18/2019 127* 0 - 100 mg/dL Final   • Chol/HDL Ratio 11/18/2019 4.68   Final   • TSH 11/18/2019 1.770  0.450 - 4.500 uIU/mL Final   • T4, Total 11/18/2019 8.4  4.5 - 12.0 ug/dL Final   • T3 Uptake 11/18/2019 24  24 - 39 % Final   • Free Thyroxine Index 11/18/2019 2.0  1.2 - 4.9 Final       Assessment/Plan   Juliet was seen today for hypertension.    Diagnoses and all orders for this visit:    Hypercholesterolemia    Essential hypertension    Need for influenza vaccination  -     Flucelvax Quad=>4Years (PFS)    Need for Tdap vaccination  -     Tdap Vaccine Greater Than or Equal To 6yo IM        1.  Chronic and stable hypercholesterolemia: I have reviewed lab work that was collected on November 18, 2019 with him at office visit today.  Fasting blood sugar was 103, cholesterol 187, triglycerides 98, HDL 40 and LDL was 127.  The LDL has decreased over the past 8 months.  Fasting blood sugar was elevated slightly.  Stressed the importance of decreasing carbohydrates and fatty food in diet.  I have given her encouragement to go to the gym at least 3 days weekly.  Plan to follow-up with fasting labs in 6 months.  2.  Chronic and stable hypertension: I have rechecked blood pressure at office visit today and got 110/68 in left arm.  She will continue her current valsartan/hydrochlorothiazide medication at home.  Plan to follow-up in 6 months with annual physical examination with fasting lab  work.  3.  Need for updated influenza and Tdap vaccination: Scarlet has given written consent to receive updated flu shot and Tdap vaccination at office visit today.  She is currently asymptomatic.    [facility]  [address] [place] [phone]  Dept: [phone]  Dept Fax: [phone]  Loc: [phone]  Loc Fax: [phone]

## 2020-01-02 DIAGNOSIS — Z78.9 USES BIRTH CONTROL: Primary | ICD-10-CM

## 2020-01-02 RX ORDER — ACETAMINOPHEN AND CODEINE PHOSPHATE 120; 12 MG/5ML; MG/5ML
1 SOLUTION ORAL DAILY
Qty: 28 TABLET | Refills: 0 | Status: SHIPPED | OUTPATIENT
Start: 2020-01-02 | End: 2022-10-05

## 2020-01-15 ENCOUNTER — OFFICE VISIT (OUTPATIENT)
Dept: FAMILY MEDICINE CLINIC | Facility: CLINIC | Age: 50
End: 2020-01-15

## 2020-01-15 ENCOUNTER — HOSPITAL ENCOUNTER (OUTPATIENT)
Dept: GENERAL RADIOLOGY | Facility: HOSPITAL | Age: 50
Discharge: HOME | End: 2020-01-15
Admitting: PHYSICIAN ASSISTANT

## 2020-01-15 VITALS
WEIGHT: 233 LBS | TEMPERATURE: 98.2 F | HEIGHT: 63 IN | OXYGEN SATURATION: 97 % | HEART RATE: 99 BPM | SYSTOLIC BLOOD PRESSURE: 124 MMHG | RESPIRATION RATE: 16 BRPM | DIASTOLIC BLOOD PRESSURE: 82 MMHG | BODY MASS INDEX: 41.29 KG/M2

## 2020-01-15 DIAGNOSIS — M25.562 ACUTE PAIN OF LEFT KNEE: Primary | ICD-10-CM

## 2020-01-15 DIAGNOSIS — M25.562 ACUTE PAIN OF LEFT KNEE: ICD-10-CM

## 2020-01-15 PROCEDURE — 73562 X-RAY EXAM OF KNEE 3: CPT

## 2020-01-15 PROCEDURE — 99213 OFFICE O/P EST LOW 20 MIN: CPT | Performed by: PHYSICIAN ASSISTANT

## 2020-01-15 NOTE — PROGRESS NOTES
"Subjective   Juliet Fowler is a 49 y.o. female presents for   Chief Complaint   Patient presents with   • Knee Pain     x1 week       History of Present Illness     Scarlet is a 49-year-old female who presents with left knee pain for he past week.  Describes the pain as a an achy feeling that is constant.  The pain is worst at night.  Her knee has given out on her once.  Denied any swelling or redness. Denied any recent injury/trauma/falls. She applied heat last night which has helped.  Ice and Biofreeze has helped slightly.   She has taken OTC IBU with slight relief.      The following portions of the patient's history were reviewed and updated as appropriate: allergies, current medications, past family history, past medical history, past social history, past surgical history and problem list.    Review of Systems   Constitutional: Negative.    HENT: Negative.    Eyes: Negative.    Respiratory: Negative.    Cardiovascular: Negative.    Gastrointestinal: Negative.    Endocrine: Negative.    Genitourinary: Negative.    Musculoskeletal: Negative.  Negative for gait problem and joint swelling.        Left knee pain   Skin: Negative.    Allergic/Immunologic: Negative.    Neurological: Negative.    Hematological: Negative.    Psychiatric/Behavioral: Negative.    All other systems reviewed and are negative.        Vitals:    01/15/20 1607   BP: 124/82   Pulse: 99   Resp: 16   Temp: 98.2 °F (36.8 °C)   SpO2: 97%   Weight: 106 kg (233 lb)   Height: 160 cm (63\")     Wt Readings from Last 3 Encounters:   01/15/20 106 kg (233 lb)   12/02/19 104 kg (229 lb)   10/25/19 103 kg (227 lb)     BP Readings from Last 3 Encounters:   01/15/20 124/82   12/02/19 110/68   10/25/19 128/88     Social History     Socioeconomic History   • Marital status:      Spouse name: Not on file   • Number of children: Not on file   • Years of education: Not on file   • Highest education level: Not on file   Tobacco Use   • Smoking status: Never " Smoker   • Smokeless tobacco: Never Used       Allergies   Allergen Reactions   • Dust Mite Extract Unknown (See Comments)   • Bird Grass Unknown (See Comments)   • Molds & Smuts Unknown (See Comments)   • Lisinopril Cough       Body mass index is 41.27 kg/m².    Objective   Physical Exam   Constitutional: She is oriented to person, place, and time. Vital signs are normal. She appears well-developed and well-nourished.   Musculoskeletal:        Right knee: Normal.        Left knee: She exhibits bony tenderness. She exhibits normal range of motion, no swelling, no ecchymosis, normal patellar mobility, normal meniscus and no MCL laxity. Tenderness found. Lateral joint line tenderness noted. No medial joint line tenderness noted.        Legs:  Neurological: She is alert and oriented to person, place, and time.   Skin: Skin is warm, dry and intact. Capillary refill takes less than 2 seconds.   Psychiatric: She has a normal mood and affect. Her speech is normal and behavior is normal. Judgment and thought content normal. Cognition and memory are normal.       Assessment/Plan   Juliet was seen today for knee pain.    Diagnoses and all orders for this visit:    Acute pain of left knee  -     XR Knee 3 View Left; Future      Mrs. Juliet Fowler was seen in office today with new onset left knee pain.  Will have a left knee x-ray at the [place] facility today.  Physical exam was suspicious for Daley's cyst.  She will continue using Aleve twice daily.  I have asked her to purchase over-the-counter knee brace to wear as well.  Will consider referral to orthopedist in future if warranted.  She will be notified of imaging results when completed.    GISELA Browning [facility]  [address] [place] [id number]  Dept: [phone]  Dept Fax: [phone]  Loc: [phone]  Loc Fax: [phone]

## 2020-01-30 DIAGNOSIS — M25.562 ACUTE PAIN OF LEFT KNEE: Primary | ICD-10-CM

## 2020-02-03 ENCOUNTER — OFFICE VISIT (OUTPATIENT)
Dept: ORTHOPEDIC SURGERY | Facility: CLINIC | Age: 50
End: 2020-02-03

## 2020-02-03 VITALS
DIASTOLIC BLOOD PRESSURE: 88 MMHG | BODY MASS INDEX: 39.87 KG/M2 | SYSTOLIC BLOOD PRESSURE: 128 MMHG | HEART RATE: 76 BPM | HEIGHT: 63 IN | WEIGHT: 225 LBS

## 2020-02-03 DIAGNOSIS — M94.262 CHONDROMALACIA OF KNEE, LEFT: Primary | ICD-10-CM

## 2020-02-03 PROCEDURE — 20610 DRAIN/INJ JOINT/BURSA W/O US: CPT | Performed by: NURSE PRACTITIONER

## 2020-02-03 PROCEDURE — 99203 OFFICE O/P NEW LOW 30 MIN: CPT | Performed by: NURSE PRACTITIONER

## 2020-02-03 RX ORDER — LIDOCAINE HYDROCHLORIDE 10 MG/ML
8 INJECTION, SOLUTION EPIDURAL; INFILTRATION; INTRACAUDAL; PERINEURAL
Status: COMPLETED | OUTPATIENT
Start: 2020-02-03 | End: 2020-02-03

## 2020-02-03 RX ORDER — TRIAMCINOLONE ACETONIDE 40 MG/ML
80 INJECTION, SUSPENSION INTRA-ARTICULAR; INTRAMUSCULAR
Status: COMPLETED | OUTPATIENT
Start: 2020-02-03 | End: 2020-02-03

## 2020-02-03 RX ADMIN — LIDOCAINE HYDROCHLORIDE 8 ML: 10 INJECTION, SOLUTION EPIDURAL; INFILTRATION; INTRACAUDAL; PERINEURAL at 10:03

## 2020-02-03 RX ADMIN — TRIAMCINOLONE ACETONIDE 80 MG: 40 INJECTION, SUSPENSION INTRA-ARTICULAR; INTRAMUSCULAR at 10:03

## 2020-02-03 NOTE — PATIENT INSTRUCTIONS
Post-injection instructions    ? Apply ice to the injection side as needed to relieve pain, 10-15 minutes on and off every few hours.     ? Watch for signs and symptoms of infection, including significantly increased pain, redness, swelling, warmth to the touch, fevers, sweats, chills. If these symptoms occur, please notify our office immediately [phone].    ? Keep the injection site clean. You may remove adhesive bandage once bleeding has stopped.    ? Do not engage in any new or strenuous activities for at least the next 24 hours until soreness has stopped.    ? Some people will receive immediate relief with a steroid injection however it takes several days before the steroid starts working. You may receive 3-4 steroid injections a year, if necessary. If you get no relief with from the injection, we will continue to work with you to explore other treatment options.    ? It will take approximately four weeks after the last (or single) gel injection before you notice symptom relief.    * Some people experience redness or feeling of warmth after receiving corticosteroid injection. If you have diabetes, the corticosteroid injection may temporarily increase your blood sugar levels. Continue to check glucose levels and if not improving, contact your primary care provider for further treatment.   * Please keep mind that this is not a one-size-fits all approach. If you have questions or concerns, contact our office.

## 2020-02-03 NOTE — PROGRESS NOTES
Subjective:     Patient ID: Juliet Fowler is a 50 y.o. female.    Chief Complaint:  Right knee pain  History of Present Illness  Juliet Fowler 50-year-old female presents to clinic with a one-month history of pain at the right knee.  Maximal tenderness present the anterior aspect the pain has gotten worse and is at this point in time not improving.  She is experiencing stiffness along with pain in the a.m, seated with knee flexed and attempting to get up, ascending and descending steps with her making symptoms worse.  She has recently began working out at the gym but was noticing pain prior to joining.  Denies any previous MRI, CT or any surgical procedures at the left knee.  She has been seen by her primary care provider x-rays were completed and has been taking Aleve twice daily which does seem to help the pain continues to return.  She is also experiencing pain to the posterior aspect of the knee with deep flexion and full extension.  Denies of the knee is locking, catching or giving out on her.  Pain is not radiating into the groin it does occasionally radiate to the lateral aspect of the hip on a consistent basis.  Denies presence of numbness or tingling left lower extremity.  Denies other concerns present this time.       Social History     Occupational History   • Not on file   Tobacco Use   • Smoking status: Never Smoker   • Smokeless tobacco: Never Used   Substance and Sexual Activity   • Alcohol use: Not on file   • Drug use: Not on file   • Sexual activity: Not on file      Past Medical History:   Diagnosis Date   • Asthma    • Heartburn    • Hypercholesterolemia 1/25/2018   • Hypertension    • Obesity (BMI 30-39.9) 7/18/2019     Past Surgical History:   Procedure Laterality Date   • CHOLECYSTECTOMY         Family History   Problem Relation Age of Onset   • Heart disease Maternal Grandmother    • Hypertension Maternal Grandmother    • Lung disease Maternal Grandmother    • Kidney disease Maternal  "Grandmother    • Prostate cancer Maternal Grandfather    • Heart disease Paternal Grandfather    • Hypertension Father    • Breast cancer Neg Hx          Review of Systems   Constitutional: Negative for chills, diaphoresis, fever and unexpected weight change.   HENT: Positive for sore throat. Negative for hearing loss, nosebleeds and tinnitus.    Eyes: Negative for pain and visual disturbance.   Respiratory: Positive for cough. Negative for shortness of breath and wheezing.    Cardiovascular: Negative for chest pain and palpitations.   Gastrointestinal: Negative for abdominal pain, diarrhea, nausea and vomiting.   Endocrine: Negative for cold intolerance, heat intolerance and polydipsia.   Genitourinary: Negative for difficulty urinating, dysuria and hematuria.   Musculoskeletal: Positive for arthralgias. Negative for joint swelling and myalgias.   Skin: Negative for rash and wound.   Allergic/Immunologic: Negative for environmental allergies.   Neurological: Negative for dizziness, syncope and numbness.   Hematological: Does not bruise/bleed easily.   Psychiatric/Behavioral: Negative for dysphoric mood and sleep disturbance. The patient is not nervous/anxious.    All other systems reviewed and are negative.          Objective:  Physical Exam    Vital signs reviewed.   General: No acute distress.  Eyes: conjunctiva clear; pupils equally round and reactive  ENT: external ears and nose atraumatic; oropharynx clear  CV: no peripheral edema  Resp: normal respiratory effort  Skin: no rashes or wounds; normal turgor  Psych: mood and affect appropriate; recent and remote memory intact    Vitals:    02/03/20 0926   BP: 128/88   Pulse: 76   Weight: 102 kg (225 lb)   Height: 160 cm (63\")         02/03/20  0926   Weight: 102 kg (225 lb)     Body mass index is 39.86 kg/m².      Left Knee Exam     Tenderness   The patient is experiencing tenderness in the patella.    Range of Motion   Extension: 0   Flexion: 120     Tests "   Yue:  Medial - negative Lateral - negative  Varus: negative Valgus: negative  Lachman:  Anterior - 1+    Posterior - negative  Drawer:  Anterior - negative     Posterior - negative  Patellar apprehension: positive    Other   Erythema: absent  Sensation: normal  Pulse: present  Swelling: moderate  Effusion: no effusion present    Comments:  Positive active patellar compression test  Positive crepitus throughout arc of motion  Negative logroll exam  Negative Stinchfield exam                 Imaging:  Independently reviewed three-view x-ray imaging previously completed outside facility with slight osteophyte noted superior patellar pole, no acute fracture, dislocation or other acute osseous injury  Assessment:        1. Chondromalacia of knee, left           Plan:  1.  Discussed plan of care with patient.  Wish to proceed with corticosteroid injection left knee.  Plan to see her back in clinic in 6 weeks to reevaluate.  Provided her with post injection instructions, home strengthening exercises Long discussion with what she is able to complete at gym and activities to avoid.  Encouraged to call with any concerns or questions she has between now and follow-up.  She verbalized understanding of all information agrees with plan of care.  May continue to take Aleve over-the-counter twice daily as needed.  Denies other concerns present this time.  Large Joint Arthrocentesis: L knee  Date/Time: 2/3/2020 10:03 AM  Consent given by: patient  Site marked: site marked  Timeout: Immediately prior to procedure a time out was called to verify the correct patient, procedure, equipment, support staff and site/side marked as required   Supporting Documentation  Indications: pain   Procedure Details  Location: knee - L knee  Preparation: Patient was prepped and draped in the usual sterile fashion  Needle size: 22 G  Approach: superior  Medications administered: 8 mL lidocaine PF 1% 1 %; 80 mg triamcinolone acetonide 40  MG/ML  Patient tolerance: patient tolerated the procedure well with no immediate complications          Orders:  No orders of the defined types were placed in this encounter.        I ordered and reviewed the HAYLEE today.     Dictated utilizing Dragon dictation   Answers for HPI/ROS submitted by the patient on 1/31/2020   What is the primary reason for your visit?: Other  Please describe your symptoms.: Ongoing knee pain for 3-4 weeks.   I had xrays a couple of weeks ago  and it appeared normal.  I don't remember doing anything to cause injury.  I have been wearing a knee brace for 2 weeks which has helped some.  Have you had these symptoms before?: No  How long have you been having these symptoms?: 1-4 weeks ago  Please list any medications you are currently taking for this condition.: Aleve  Please describe any probable cause for these symptoms. : ?

## 2020-02-18 ENCOUNTER — OFFICE VISIT (OUTPATIENT)
Dept: FAMILY MEDICINE CLINIC | Facility: CLINIC | Age: 50
End: 2020-02-18

## 2020-02-18 VITALS
OXYGEN SATURATION: 97 % | HEIGHT: 63 IN | DIASTOLIC BLOOD PRESSURE: 70 MMHG | HEART RATE: 90 BPM | BODY MASS INDEX: 40.57 KG/M2 | SYSTOLIC BLOOD PRESSURE: 120 MMHG | WEIGHT: 229 LBS | TEMPERATURE: 98.8 F | RESPIRATION RATE: 16 BRPM

## 2020-02-18 DIAGNOSIS — J06.9 ACUTE URI: Primary | ICD-10-CM

## 2020-02-18 DIAGNOSIS — J02.9 SORE THROAT: ICD-10-CM

## 2020-02-18 LAB
EXPIRATION DATE: NORMAL
INTERNAL CONTROL: NORMAL
Lab: NORMAL
S PYO AG THROAT QL: NEGATIVE

## 2020-02-18 PROCEDURE — 87880 STREP A ASSAY W/OPTIC: CPT | Performed by: NURSE PRACTITIONER

## 2020-02-18 PROCEDURE — 99213 OFFICE O/P EST LOW 20 MIN: CPT | Performed by: NURSE PRACTITIONER

## 2020-02-18 RX ORDER — FLUCONAZOLE 150 MG/1
150 TABLET ORAL ONCE
Qty: 1 TABLET | Refills: 0 | Status: SHIPPED | OUTPATIENT
Start: 2020-02-18 | End: 2020-02-18

## 2020-02-18 RX ORDER — METHYLPREDNISOLONE 4 MG/1
TABLET ORAL
Qty: 1 EACH | Refills: 0 | Status: SHIPPED | OUTPATIENT
Start: 2020-02-18 | End: 2020-03-16

## 2020-02-18 RX ORDER — AZITHROMYCIN 250 MG/1
TABLET, FILM COATED ORAL
Qty: 6 TABLET | Refills: 0 | Status: SHIPPED | OUTPATIENT
Start: 2020-02-18 | End: 2020-03-16

## 2020-02-18 NOTE — PATIENT INSTRUCTIONS
"Upper Respiratory Infection, Adult  An upper respiratory infection (URI) affects the nose, throat, and upper air passages. URIs are caused by germs (viruses). The most common type of URI is often called \"the common cold.\"  Medicines cannot cure URIs, but you can do things at home to relieve your symptoms. URIs usually get better within 7-10 days.  Follow these instructions at home:  Activity  · Rest as needed.  · If you have a fever, stay home from work or school until your fever is gone, or until your doctor says you may return to work or school.  ? You should stay home until you cannot spread the infection anymore (you are not contagious).  ? Your doctor may have you wear a face mask so you have less risk of spreading the infection.  Relieving symptoms  · Gargle with a salt-water mixture 3-4 times a day or as needed. To make a salt-water mixture, completely dissolve ½-1 tsp of salt in 1 cup of warm water.  · Use a cool-mist humidifier to add moisture to the air. This can help you breathe more easily.  Eating and drinking    · Drink enough fluid to keep your pee (urine) pale yellow.  · Eat soups and other clear broths.  General instructions    · Take over-the-counter and prescription medicines only as told by your doctor. These include cold medicines, fever reducers, and cough suppressants.  · Do not use any products that contain nicotine or tobacco. These include cigarettes and e-cigarettes. If you need help quitting, ask your doctor.  · Avoid being where people are smoking (avoid secondhand smoke).  · Make sure you get regular shots and get the flu shot every year.  · Keep all follow-up visits as told by your doctor. This is important.  How to avoid spreading infection to others    · Wash your hands often with soap and water. If you do not have soap and water, use hand .  · Avoid touching your mouth, face, eyes, or nose.  · Cough or sneeze into a tissue or your sleeve or elbow. Do not cough or sneeze " "into your hand or into the air.  Contact a doctor if:  · You are getting worse, not better.  · You have any of these:  ? A fever.  ? Chills.  ? Brown or red mucus in your nose.  ? Yellow or brown fluid (discharge)coming from your nose.  ? Pain in your face, especially when you bend forward.  ? Swollen neck glands.  ? Pain with swallowing.  ? White areas in the back of your throat.  Get help right away if:  · You have shortness of breath that gets worse.  · You have very bad or constant:  ? Headache.  ? Ear pain.  ? Pain in your forehead, behind your eyes, and over your cheekbones (sinus pain).  ? Chest pain.  · You have long-lasting (chronic) lung disease along with any of these:  ? Wheezing.  ? Long-lasting cough.  ? Coughing up blood.  ? A change in your usual mucus.  · You have a stiff neck.  · You have changes in your:  ? Vision.  ? Hearing.  ? Thinking.  ? Mood.  Summary  · An upper respiratory infection (URI) is caused by a germ called a virus. The most common type of URI is often called \"the common cold.\"  · URIs usually get better within 7-10 days.  · Take over-the-counter and prescription medicines only as told by your doctor.  This information is not intended to replace advice given to you by your health care provider. Make sure you discuss any questions you have with your health care provider.  Document Released: 06/05/2009 Document Revised: 08/10/2018 Document Reviewed: 08/10/2018  [employer] Interactive Patient Education © 2019 [employer]    Sore Throat  When you have a sore throat, your throat may feel:  · Tender.  · Burning.  · Irritated.  · Scratchy.  · Painful when you swallow.  · Painful when you talk.  Many things can cause a sore throat, such as:  · An infection.  · Allergies.  · Dry air.  · Smoke or pollution.  · Radiation treatment.  · Gastroesophageal reflux disease (GERD).  · A tumor.  A sore throat can be the first sign of another sickness. It can happen with other problems, " like:  · Coughing.  · Sneezing.  · Fever.  · Swelling in the neck.  Most sore throats go away without treatment.  Follow these instructions at home:         · Take over-the-counter medicines only as told by your doctor.  ? If your child has a sore throat, do not give your child aspirin.  · Drink enough fluids to keep your pee (urine) pale yellow.  · Rest when you feel you need to.  · To help with pain:  ? Sip warm liquids, such as broth, herbal tea, or warm water.  ? Eat or drink cold or frozen liquids, such as frozen ice pops.  ? Gargle with a salt-water mixture 3-4 times a day or as needed. To make a salt-water mixture, add ½-1 tsp (3-6 g) of salt to 1 cup (237 mL) of warm water. Mix it until you cannot see the salt anymore.  ? Suck on hard candy or throat lozenges.  ? Put a cool-mist humidifier in your bedroom at night.  ? Sit in the bathroom with the door closed for 5-10 minutes while you run hot water in the shower.  · Do not use any products that contain nicotine or tobacco, such as cigarettes, e-cigarettes, and chewing tobacco. If you need help quitting, ask your doctor.  · Wash your hands well and often with soap and water. If soap and water are not available, use hand .  Contact a doctor if:  · You have a fever for more than 2-3 days.  · You keep having symptoms for more than 2-3 days.  · Your throat does not get better in 7 days.  · You have a fever and your symptoms suddenly get worse.  · Your child who is 3 months to 3 years old has a temperature of 102.2°F (39°C) or higher.  Get help right away if:  · You have trouble breathing.  · You cannot swallow fluids, soft foods, or your saliva.  · You have swelling in your throat or neck that gets worse.  · You keep feeling sick to your stomach (nauseous).  · You keep throwing up (vomiting).  Summary  · A sore throat is pain, burning, irritation, or scratchiness in the throat. Many things can cause a sore throat.  · Take over-the-counter medicines only  as told by your doctor. Do not give your child aspirin.  · Drink plenty of fluids, and rest as needed.  · Contact a doctor if your symptoms get worse or your sore throat does not get better within 7 days.  This information is not intended to replace advice given to you by your health care provider. Make sure you discuss any questions you have with your health care provider.  Document Released: 09/26/2009 Document Revised: 05/20/2019 Document Reviewed: 05/20/2019  [employer] Interactive Patient Education © 2019 [employer]

## 2020-02-18 NOTE — PROGRESS NOTES
"Chief Complaint   Patient presents with   • Sore Throat       Upper Respiratory Infection: Patient complains of symptoms of a URI. Symptoms include bilateral ear pain, congestion, cough and sore throat. Onset of symptoms was 1 week ago, gradually worsening since that time. She also c/o achiness, congestion, non productive cough and sore throat for the past 1 week .  She is drinking moderate amounts of fluids. Evaluation to date: none. Treatment to date: DayQuil without relief.    Ill contacts at home or school or work discussed.    The following portions of the patient's history were reviewed and updated as appropriate: allergies, current medications, past family history, past medical history, past social history, past surgical history and problem list.    A comprehensive review of systems was negative except for: Constitutional: positive for fatigue  Ears, nose, mouth, throat, and face: positive for earaches, nasal congestion, sore throat and voice change  Respiratory: positive for cough    Vitals:    02/18/20 0940   BP: 120/70   BP Location: Left arm   Patient Position: Sitting   Cuff Size: Adult   Pulse: 90   Resp: 16   Temp: 98.8 °F (37.1 °C)   SpO2: 97%   Weight: 104 kg (229 lb)   Height: 160 cm (63\")     Gen: Mildly ill appearing, alert, hoarse voice noted  Ears: TM's bulging without redness  Nose:  Congestion  Throat:  Red without exudate, some drainage  Neck: No LAD  Lung: Good air movement, regular RR  Heart: RR without murmur  Skin: No rash      Lab Results   Component Value Date    RAPSCRN Negative 02/18/2020         Assessment/Plan   Juliet was seen today for sore throat.    Diagnoses and all orders for this visit:    Acute URI    Sore throat  -     POC Rapid Strep A    Other orders  -     azithromycin (ZITHROMAX Z-TRAM) 250 MG tablet; Take 2 tablets the first day, then 1 tablet daily for 4 days.  -     methylPREDNISolone (MEDROL, TRAM,) 4 MG tablet; Take as directed on package instructions.  -     " "fluconazole (DIFLUCAN) 150 MG tablet; Take 1 tablet by mouth 1 (One) Time for 1 dose.             Patient Instructions   Upper Respiratory Infection, Adult  An upper respiratory infection (URI) affects the nose, throat, and upper air passages. URIs are caused by germs (viruses). The most common type of URI is often called \"the common cold.\"  Medicines cannot cure URIs, but you can do things at home to relieve your symptoms. URIs usually get better within 7-10 days.  Follow these instructions at home:  Activity  · Rest as needed.  · If you have a fever, stay home from work or school until your fever is gone, or until your doctor says you may return to work or school.  ? You should stay home until you cannot spread the infection anymore (you are not contagious).  ? Your doctor may have you wear a face mask so you have less risk of spreading the infection.  Relieving symptoms  · Gargle with a salt-water mixture 3-4 times a day or as needed. To make a salt-water mixture, completely dissolve ½-1 tsp of salt in 1 cup of warm water.  · Use a cool-mist humidifier to add moisture to the air. This can help you breathe more easily.  Eating and drinking    · Drink enough fluid to keep your pee (urine) pale yellow.  · Eat soups and other clear broths.  General instructions    · Take over-the-counter and prescription medicines only as told by your doctor. These include cold medicines, fever reducers, and cough suppressants.  · Do not use any products that contain nicotine or tobacco. These include cigarettes and e-cigarettes. If you need help quitting, ask your doctor.  · Avoid being where people are smoking (avoid secondhand smoke).  · Make sure you get regular shots and get the flu shot every year.  · Keep all follow-up visits as told by your doctor. This is important.  How to avoid spreading infection to others    · Wash your hands often with soap and water. If you do not have soap and water, use hand .  · Avoid " "touching your mouth, face, eyes, or nose.  · Cough or sneeze into a tissue or your sleeve or elbow. Do not cough or sneeze into your hand or into the air.  Contact a doctor if:  · You are getting worse, not better.  · You have any of these:  ? A fever.  ? Chills.  ? Brown or red mucus in your nose.  ? Yellow or brown fluid (discharge)coming from your nose.  ? Pain in your face, especially when you bend forward.  ? Swollen neck glands.  ? Pain with swallowing.  ? White areas in the back of your throat.  Get help right away if:  · You have shortness of breath that gets worse.  · You have very bad or constant:  ? Headache.  ? Ear pain.  ? Pain in your forehead, behind your eyes, and over your cheekbones (sinus pain).  ? Chest pain.  · You have long-lasting (chronic) lung disease along with any of these:  ? Wheezing.  ? Long-lasting cough.  ? Coughing up blood.  ? A change in your usual mucus.  · You have a stiff neck.  · You have changes in your:  ? Vision.  ? Hearing.  ? Thinking.  ? Mood.  Summary  · An upper respiratory infection (URI) is caused by a germ called a virus. The most common type of URI is often called \"the common cold.\"  · URIs usually get better within 7-10 days.  · Take over-the-counter and prescription medicines only as told by your doctor.  This information is not intended to replace advice given to you by your health care provider. Make sure you discuss any questions you have with your health care provider.  Document Released: 06/05/2009 Document Revised: 08/10/2018 Document Reviewed: 08/10/2018  [employer] Interactive Patient Education © 2019 [employer]    Sore Throat  When you have a sore throat, your throat may feel:  · Tender.  · Burning.  · Irritated.  · Scratchy.  · Painful when you swallow.  · Painful when you talk.  Many things can cause a sore throat, such as:  · An infection.  · Allergies.  · Dry air.  · Smoke or pollution.  · Radiation treatment.  · Gastroesophageal reflux disease " (GERD).  · A tumor.  A sore throat can be the first sign of another sickness. It can happen with other problems, like:  · Coughing.  · Sneezing.  · Fever.  · Swelling in the neck.  Most sore throats go away without treatment.  Follow these instructions at home:         · Take over-the-counter medicines only as told by your doctor.  ? If your child has a sore throat, do not give your child aspirin.  · Drink enough fluids to keep your pee (urine) pale yellow.  · Rest when you feel you need to.  · To help with pain:  ? Sip warm liquids, such as broth, herbal tea, or warm water.  ? Eat or drink cold or frozen liquids, such as frozen ice pops.  ? Gargle with a salt-water mixture 3-4 times a day or as needed. To make a salt-water mixture, add ½-1 tsp (3-6 g) of salt to 1 cup (237 mL) of warm water. Mix it until you cannot see the salt anymore.  ? Suck on hard candy or throat lozenges.  ? Put a cool-mist humidifier in your bedroom at night.  ? Sit in the bathroom with the door closed for 5-10 minutes while you run hot water in the shower.  · Do not use any products that contain nicotine or tobacco, such as cigarettes, e-cigarettes, and chewing tobacco. If you need help quitting, ask your doctor.  · Wash your hands well and often with soap and water. If soap and water are not available, use hand .  Contact a doctor if:  · You have a fever for more than 2-3 days.  · You keep having symptoms for more than 2-3 days.  · Your throat does not get better in 7 days.  · You have a fever and your symptoms suddenly get worse.  · Your child who is 3 months to 3 years old has a temperature of 102.2°F (39°C) or higher.  Get help right away if:  · You have trouble breathing.  · You cannot swallow fluids, soft foods, or your saliva.  · You have swelling in your throat or neck that gets worse.  · You keep feeling sick to your stomach (nauseous).  · You keep throwing up (vomiting).  Summary  · A sore throat is pain, burning,  irritation, or scratchiness in the throat. Many things can cause a sore throat.  · Take over-the-counter medicines only as told by your doctor. Do not give your child aspirin.  · Drink plenty of fluids, and rest as needed.  · Contact a doctor if your symptoms get worse or your sore throat does not get better within 7 days.  This information is not intended to replace advice given to you by your health care provider. Make sure you discuss any questions you have with your health care provider.  Document Released: 09/26/2009 Document Revised: 05/20/2019 Document Reviewed: 05/20/2019  [employer] Interactive Patient Education © 2019 [employer]        Tylenol or Advil as needed for pain, fever, muscle aches  Plenty of fluids  Hand washing discussed  Off work or school note given if needed.  Warm tea for throat.  Pros and cons of antibiotic use discussed.  Instructed to notify us if symptoms worsen or do not improve.      Tiff Roe, APRN  Family Practice  [facility] [place]

## 2020-02-27 DIAGNOSIS — I10 ESSENTIAL HYPERTENSION: ICD-10-CM

## 2020-02-27 RX ORDER — VALSARTAN AND HYDROCHLOROTHIAZIDE 80; 12.5 MG/1; MG/1
TABLET, FILM COATED ORAL
Qty: 30 TABLET | Refills: 2 | Status: SHIPPED | OUTPATIENT
Start: 2020-02-27 | End: 2020-06-08

## 2020-03-16 ENCOUNTER — OFFICE VISIT (OUTPATIENT)
Dept: ORTHOPEDIC SURGERY | Facility: CLINIC | Age: 50
End: 2020-03-16

## 2020-03-16 DIAGNOSIS — M17.12 PRIMARY OSTEOARTHRITIS OF LEFT KNEE: Primary | ICD-10-CM

## 2020-03-16 PROCEDURE — 99213 OFFICE O/P EST LOW 20 MIN: CPT | Performed by: NURSE PRACTITIONER

## 2020-03-16 RX ORDER — MELOXICAM 15 MG/1
15 TABLET ORAL DAILY
Qty: 30 TABLET | Refills: 2 | Status: SHIPPED | OUTPATIENT
Start: 2020-03-16 | End: 2021-02-09

## 2020-03-16 NOTE — PROGRESS NOTES
Subjective:     Patient ID: Juliet Fowler is a 50 y.o. female.    Chief Complaint:  Follow-up chondromalacia left knee  History of Present Illness  Juliet Fowler returns to clinic for follow-up left knee.  Received corticosteroid injection approximately 6 weeks ago 2/3/2020 the left knee with 100% symptom relief for the first 5 weeks.  Began noticing return of pain on Wednesday posterior joint line positive for pain with deep flexion.  She is noticing swelling at the left knee.  She has continued exercising tolerating well is also continued over-the-counter Aleve as needed for symptom relief.  Rates discomfort today 1-2 out of a 10 stiff in nature.  Pain is not radiating to her groin or to the lateral aspect of her hip.  Is not experiencing numbness or tingling radiating down the left lower extremity.  She is extremely pleased with the symptom relief she is achieved thus far with corticosteroid injection just has returned.  Denies any previous Visco supplementation injections or any other further treatment of the left knee.  Denies of the knee is locking, catching giving way.  Denies other concerns present time.     Social History     Occupational History   • Not on file   Tobacco Use   • Smoking status: Never Smoker   • Smokeless tobacco: Never Used   Substance and Sexual Activity   • Alcohol use: Not on file   • Drug use: Not on file   • Sexual activity: Not on file      Past Medical History:   Diagnosis Date   • Asthma    • Heartburn    • Hypercholesterolemia 1/25/2018   • Hypertension    • Obesity (BMI 30-39.9) 7/18/2019     Past Surgical History:   Procedure Laterality Date   • CHOLECYSTECTOMY         Family History   Problem Relation Age of Onset   • Heart disease Maternal Grandmother    • Hypertension Maternal Grandmother    • Lung disease Maternal Grandmother    • Kidney disease Maternal Grandmother    • Prostate cancer Maternal Grandfather    • Heart disease Paternal Grandfather    • Hypertension Father     • Breast cancer Neg Hx          Review of Systems   Constitutional: Negative for chills, diaphoresis, fever and unexpected weight change.   HENT: Negative for hearing loss, nosebleeds, sore throat and tinnitus.    Eyes: Negative for pain and visual disturbance.   Respiratory: Negative for cough, shortness of breath and wheezing.    Cardiovascular: Negative for chest pain and palpitations.   Gastrointestinal: Negative for abdominal pain, diarrhea, nausea and vomiting.   Endocrine: Negative for cold intolerance, heat intolerance and polydipsia.   Genitourinary: Negative for difficulty urinating, dysuria and hematuria.   Musculoskeletal: Positive for arthralgias. Negative for joint swelling and myalgias.   Skin: Negative for rash and wound.   Allergic/Immunologic: Negative for environmental allergies.   Neurological: Negative for dizziness, syncope and numbness.   Hematological: Does not bruise/bleed easily.   Psychiatric/Behavioral: Negative for dysphoric mood and sleep disturbance. The patient is not nervous/anxious.    All other systems reviewed and are negative.      Objective:  Physical Exam  General: No acute distress.  Eyes: conjunctiva clear; pupils equally round and reactive  ENT: external ears and nose atraumatic; oropharynx clear  CV: no peripheral edema  Resp: normal respiratory effort  Skin: no rashes or wounds; normal turgor  Psych: mood and affect appropriate; recent and remote memory intact    There were no vitals filed for this visit.  There were no vitals filed for this visit.  There is no height or weight on file to calculate BMI.      Left Knee Exam     Range of Motion   Extension: 0   Flexion: 130     Tests   Yue:  Medial - negative Lateral - negative  Varus: negative Valgus: negative  Lachman:  Anterior - 1+    Posterior - negative  Drawer:  Anterior - negative     Posterior - negative  Patellar apprehension: negative    Other   Erythema: absent  Sensation: normal  Pulse:  present  Swelling: moderate  Effusion: no effusion present    Comments:  Maximal tenderness posterior joint line           Assessment:        1. Primary osteoarthritis of left knee           Plan:  1.  Discussed plan of care with patient.  We will submit for Visco supplementation injection to the left knee.  Will start meloxicam 15 mg 1 tablet once daily for the next 6 weeks.  Discussed this will help further reduce the inflammation swelling she is experiencing posterior joint line which is most painful.  I do recommend she continue with home strengthening exercises.  She is fearful her gym will shut down will provide her with basic core, hip and knee, basic shoulder exercises she can complete at home.  Plan to see her back in clinic once authorization approved to start injections.  She verbalized her standing will information agrees with plan of care.  I discussed with patient her goal is to get her off meloxicam so she is not taking it daily per her preference.  Discussed to avoid concurrent use of any other NSAIDs, over-the-counter including but not limited to Aleve, naproxen, ibuprofen, Advil.  Patient verbalized understanding of all information agrees with plan of care.  Denies other concerns present this time.  Orders:  Orders Placed This Encounter   Procedures   • Visco Treatment       Medications:  New Medications Ordered This Visit   Medications   • meloxicam (MOBIC) 15 MG tablet     Sig: Take 1 tablet by mouth Daily.     Dispense:  30 tablet     Refill:  2       Followup:  Return After authorization for Visco supplementation injection approved .    Juliet was seen today for pain.    Diagnoses and all orders for this visit:    Primary osteoarthritis of left knee  -     Visco Treatment; Future    Other orders  -     meloxicam (MOBIC) 15 MG tablet; Take 1 tablet by mouth Daily.        Dictated utilizing Dragon dictation

## 2020-04-01 DIAGNOSIS — R12 HEARTBURN: ICD-10-CM

## 2020-04-01 DIAGNOSIS — J45.20 MILD INTERMITTENT ASTHMA WITHOUT COMPLICATION: ICD-10-CM

## 2020-04-01 RX ORDER — OMEPRAZOLE 40 MG/1
CAPSULE, DELAYED RELEASE ORAL
Qty: 30 CAPSULE | Refills: 5 | Status: SHIPPED | OUTPATIENT
Start: 2020-04-01 | End: 2020-11-05

## 2020-04-01 RX ORDER — BUDESONIDE AND FORMOTEROL FUMARATE DIHYDRATE 160; 4.5 UG/1; UG/1
AEROSOL RESPIRATORY (INHALATION)
Qty: 10.2 G | Refills: 11 | Status: SHIPPED | OUTPATIENT
Start: 2020-04-01 | End: 2020-04-02

## 2020-04-02 DIAGNOSIS — J45.20 MILD INTERMITTENT ASTHMA WITHOUT COMPLICATION: ICD-10-CM

## 2020-04-02 RX ORDER — BUDESONIDE AND FORMOTEROL FUMARATE DIHYDRATE 160; 4.5 UG/1; UG/1
2 AEROSOL RESPIRATORY (INHALATION) 2 TIMES DAILY
Qty: 10.2 G | Refills: 11 | Status: SHIPPED | OUTPATIENT
Start: 2020-04-02 | End: 2021-05-28

## 2020-06-02 DIAGNOSIS — Z00.00 ANNUAL PHYSICAL EXAM: ICD-10-CM

## 2020-06-02 DIAGNOSIS — R03.0 BORDERLINE HYPERTENSION: Primary | ICD-10-CM

## 2020-06-02 DIAGNOSIS — E78.00 HYPERCHOLESTEROLEMIA: ICD-10-CM

## 2020-06-04 LAB
ALBUMIN SERPL-MCNC: 4.3 G/DL (ref 3.5–5.2)
ALBUMIN/GLOB SERPL: 1.4 G/DL
ALP SERPL-CCNC: 68 U/L (ref 39–117)
ALT SERPL-CCNC: 32 U/L (ref 1–33)
AST SERPL-CCNC: 18 U/L (ref 1–32)
BASOPHILS # BLD AUTO: 0.05 10*3/MM3 (ref 0–0.2)
BASOPHILS NFR BLD AUTO: 0.5 % (ref 0–1.5)
BILIRUB SERPL-MCNC: 0.5 MG/DL (ref 0.2–1.2)
BUN SERPL-MCNC: 11 MG/DL (ref 6–20)
BUN/CREAT SERPL: 12.5 (ref 7–25)
CALCIUM SERPL-MCNC: 9.6 MG/DL (ref 8.6–10.5)
CHLORIDE SERPL-SCNC: 97 MMOL/L (ref 98–107)
CHOLEST SERPL-MCNC: 177 MG/DL (ref 0–200)
CHOLEST/HDLC SERPL: 4.43 {RATIO}
CO2 SERPL-SCNC: 29.1 MMOL/L (ref 22–29)
CREAT SERPL-MCNC: 0.88 MG/DL (ref 0.57–1)
EOSINOPHIL # BLD AUTO: 0.09 10*3/MM3 (ref 0–0.4)
EOSINOPHIL NFR BLD AUTO: 0.9 % (ref 0.3–6.2)
ERYTHROCYTE [DISTWIDTH] IN BLOOD BY AUTOMATED COUNT: 12.3 % (ref 12.3–15.4)
GLOBULIN SER CALC-MCNC: 3.1 GM/DL
GLUCOSE SERPL-MCNC: 105 MG/DL (ref 65–99)
HCT VFR BLD AUTO: 40.5 % (ref 34–46.6)
HDLC SERPL-MCNC: 40 MG/DL (ref 40–60)
HGB BLD-MCNC: 13.6 G/DL (ref 12–15.9)
IMM GRANULOCYTES # BLD AUTO: 0.05 10*3/MM3 (ref 0–0.05)
IMM GRANULOCYTES NFR BLD AUTO: 0.5 % (ref 0–0.5)
LDLC SERPL CALC-MCNC: 114 MG/DL (ref 0–100)
LYMPHOCYTES # BLD AUTO: 1.61 10*3/MM3 (ref 0.7–3.1)
LYMPHOCYTES NFR BLD AUTO: 15.8 % (ref 19.6–45.3)
MCH RBC QN AUTO: 33.4 PG (ref 26.6–33)
MCHC RBC AUTO-ENTMCNC: 33.6 G/DL (ref 31.5–35.7)
MCV RBC AUTO: 99.5 FL (ref 79–97)
MONOCYTES # BLD AUTO: 0.8 10*3/MM3 (ref 0.1–0.9)
MONOCYTES NFR BLD AUTO: 7.9 % (ref 5–12)
NEUTROPHILS # BLD AUTO: 7.58 10*3/MM3 (ref 1.7–7)
NEUTROPHILS NFR BLD AUTO: 74.4 % (ref 42.7–76)
NRBC BLD AUTO-RTO: 0 /100 WBC (ref 0–0.2)
PLATELET # BLD AUTO: 354 10*3/MM3 (ref 140–450)
POTASSIUM SERPL-SCNC: 4 MMOL/L (ref 3.5–5.2)
PROT SERPL-MCNC: 7.4 G/DL (ref 6–8.5)
RBC # BLD AUTO: 4.07 10*6/MM3 (ref 3.77–5.28)
SODIUM SERPL-SCNC: 134 MMOL/L (ref 136–145)
TRIGL SERPL-MCNC: 114 MG/DL (ref 0–150)
VLDLC SERPL CALC-MCNC: 22.8 MG/DL
WBC # BLD AUTO: 10.18 10*3/MM3 (ref 3.4–10.8)

## 2020-06-05 DIAGNOSIS — I10 ESSENTIAL HYPERTENSION: ICD-10-CM

## 2020-06-05 LAB
HCV AB S/CO SERPL IA: <0.1 S/CO RATIO (ref 0–0.9)
Lab: NORMAL
WRITTEN AUTHORIZATION: NORMAL

## 2020-06-08 RX ORDER — VALSARTAN AND HYDROCHLOROTHIAZIDE 80; 12.5 MG/1; MG/1
TABLET, FILM COATED ORAL
Qty: 30 TABLET | Refills: 0 | Status: SHIPPED | OUTPATIENT
Start: 2020-06-08 | End: 2020-07-15

## 2020-06-10 ENCOUNTER — OFFICE VISIT (OUTPATIENT)
Dept: FAMILY MEDICINE CLINIC | Facility: CLINIC | Age: 50
End: 2020-06-10

## 2020-06-10 ENCOUNTER — RESULTS ENCOUNTER (OUTPATIENT)
Dept: FAMILY MEDICINE CLINIC | Facility: CLINIC | Age: 50
End: 2020-06-10

## 2020-06-10 VITALS
HEIGHT: 63 IN | DIASTOLIC BLOOD PRESSURE: 88 MMHG | OXYGEN SATURATION: 98 % | WEIGHT: 231 LBS | BODY MASS INDEX: 40.93 KG/M2 | TEMPERATURE: 97.2 F | HEART RATE: 92 BPM | RESPIRATION RATE: 16 BRPM | SYSTOLIC BLOOD PRESSURE: 128 MMHG

## 2020-06-10 DIAGNOSIS — Z12.12 SCREENING FOR MALIGNANT NEOPLASM OF THE RECTUM: ICD-10-CM

## 2020-06-10 DIAGNOSIS — Z12.11 SPECIAL SCREENING FOR MALIGNANT NEOPLASMS, COLON: ICD-10-CM

## 2020-06-10 DIAGNOSIS — I10 ESSENTIAL HYPERTENSION: ICD-10-CM

## 2020-06-10 DIAGNOSIS — Z00.00 ANNUAL PHYSICAL EXAM: Primary | ICD-10-CM

## 2020-06-10 DIAGNOSIS — E78.00 HYPERCHOLESTEROLEMIA: ICD-10-CM

## 2020-06-10 PROBLEM — J06.9 ACUTE URI: Status: RESOLVED | Noted: 2019-10-25 | Resolved: 2020-06-10

## 2020-06-10 PROCEDURE — 99396 PREV VISIT EST AGE 40-64: CPT | Performed by: PHYSICIAN ASSISTANT

## 2020-06-10 NOTE — PROGRESS NOTES
Subjective   Juliet Fowler is a 50 y.o. female presents for   Chief Complaint   Patient presents with   • Annual Exam   • Hypertension     Management   • Hyperlipidemia     Management       History of Present Illness     Scarlet is a 50-year-old female who presents for annual physical examination with hyperlipidemia and hypertension management.  She has gained 2 pounds since February 18, 2020.  Scarlet states she is doing well at office visit today and has no complaints.  Bowel movements have been daily without dark black tarry stools.  Diet has been slightly healthy.  She has been trying to walk daily.  Sleep has been normal.  Scarlet's bowel movements are daily without dark black tarry stools.  Denied any upper respiratory infections, fevers, chills, cough, wheezing, shortness of air, dizziness, abdominal pain, urinary symptoms or swelling of ankles.    The following portions of the patient's history were reviewed and updated as appropriate: allergies, current medications, past family history, past medical history, past social history, past surgical history and problem list.    Review of Systems   Constitutional: Negative.  Negative for chills, fatigue and fever.   HENT: Negative.    Eyes: Negative.    Respiratory: Negative.  Negative for cough, chest tightness, shortness of breath and wheezing.    Cardiovascular: Negative.  Negative for chest pain, palpitations and leg swelling.   Gastrointestinal: Negative.  Negative for abdominal pain, constipation, diarrhea, nausea and vomiting.   Endocrine: Negative.    Genitourinary: Negative.  Negative for decreased urine volume, frequency and urgency.   Musculoskeletal: Negative.    Skin: Negative.    Allergic/Immunologic: Negative.    Neurological: Negative.  Negative for dizziness, light-headedness and headaches.   Hematological: Negative.    Psychiatric/Behavioral: Negative.  Negative for sleep disturbance and suicidal ideas.   All other systems reviewed and are  "negative.        Vitals:    06/10/20 0829   BP: 128/88   Pulse: 92   Resp: 16   Temp: 97.2 °F (36.2 °C)   SpO2: 98%   Weight: 105 kg (231 lb)   Height: 160 cm (63\")     Wt Readings from Last 3 Encounters:   06/10/20 105 kg (231 lb)   02/18/20 104 kg (229 lb)   02/03/20 102 kg (225 lb)     BP Readings from Last 3 Encounters:   06/10/20 128/88   02/18/20 120/70   02/03/20 128/88     Social History     Socioeconomic History   • Marital status:      Spouse name: Not on file   • Number of children: Not on file   • Years of education: Not on file   • Highest education level: Not on file   Tobacco Use   • Smoking status: Never Smoker   • Smokeless tobacco: Never Used       Allergies   Allergen Reactions   • Dust Mite Extract Unknown (See Comments)   • Bird Grass Unknown (See Comments)   • Molds & Smuts Unknown (See Comments)   • Lisinopril Cough       Body mass index is 40.92 kg/m².    Objective   Physical Exam   Constitutional: She is oriented to person, place, and time. Vital signs are normal. She appears well-developed and well-nourished. Face mask in place.   Obese female sitting on exam table wearing a facial mask   HENT:   Head: Normocephalic and atraumatic.   Right Ear: Hearing, tympanic membrane, external ear and ear canal normal.   Left Ear: Hearing, tympanic membrane, external ear and ear canal normal.   Nose: Nose normal. Right sinus exhibits no maxillary sinus tenderness and no frontal sinus tenderness. Left sinus exhibits no maxillary sinus tenderness and no frontal sinus tenderness.   Mouth/Throat: Uvula is midline, oropharynx is clear and moist and mucous membranes are normal.   Eyes: Pupils are equal, round, and reactive to light. Conjunctivae, EOM and lids are normal.   Neck: Trachea normal and phonation normal. Neck supple. Normal carotid pulses, no hepatojugular reflux and no JVD present. No tracheal tenderness present. Carotid bruit is not present. No tracheal deviation and no edema present. " No thyroid mass and no thyromegaly present.   Cardiovascular: Normal rate, regular rhythm, S1 normal, S2 normal, normal heart sounds and normal pulses.   No murmur heard.  Pulmonary/Chest: Effort normal and breath sounds normal.   Abdominal: Soft. Normal appearance, normal aorta and bowel sounds are normal. There is no hepatomegaly. There is no tenderness.   Lymphadenopathy:     She has no cervical adenopathy.   Neurological: She is alert and oriented to person, place, and time.   Reflex Scores:       Patellar reflexes are 2+ on the right side and 2+ on the left side.  Skin: Skin is warm, dry and intact. Capillary refill takes less than 2 seconds.   Psychiatric: She has a normal mood and affect. Her speech is normal and behavior is normal. Judgment and thought content normal. Cognition and memory are normal.          I was wearing surgical mask during the entire office visit encounter.    Assessment/Plan   Juliet was seen today for annual exam, hypertension and hyperlipidemia.    Diagnoses and all orders for this visit:    Annual physical exam    Hypercholesterolemia    Essential hypertension    Special screening for malignant neoplasms, colon  -     Cologuard - Stool, Per Rectum; Future    Screening for malignant neoplasm of the rectum  -     Cologuard - Stool, Per Rectum; Future      1.  Annual physical examination with chronic and stable hypertension: I have rechecked her blood pressure at office visit today and got 108/64 in left arm using a large blood pressure cuff.  She will continue her current medications at home as directed.  Plan to follow-up in 6 months.  2.  Chronic and stable hypercholesterolemia: I reviewed her lab work that was collected on Matilde 3, 2020 with her at office visit today.  Fasting blood sugar was 105, cholesterol 177, triglycerides 114, HDL 40 and LDL was 114.  The cholesterol readings have decreased slightly over the past 6 months.  LDL cholesterol is still slightly elevated.  Stressed  the importance of decreasing fatty foods in diet.  Will also increase physical activity.  In regards to the elevated blood sugar, stressed the importance of decreasing carbohydrates and sugar in diet as well.  Will repeat fasting lab work in 6 months.  3.  Need for screening malignant neoplasms of colon and rectum: Have discussed colonoscopy and Cologuard testing.  Scarlet would like to do the Cologuard testing at home.  I have placed orders for this.  She will be notified of test results when completed.  Patient Counseling:  --Nutrition: Stressed importance of moderation in sodium/caffeine intake, saturated fat and cholesterol.  Discussed caloric balance, sufficient intake of fresh fruits, vegetables, fiber,   calcium, iron.  --Discussed the new recommendation against daily use of baby aspirin for primary prevention in low risk patients.  --Exercise: Stressed the importance of regular exercise by incorporating into daily routine.    --Substance Abuse: Discussed cessation/primary prevention of tobacco, alcohol, or other drug use; driving or other dangerous activities under the influence.    --Dental health: Discussed importance of regular tooth brushing, flossing, and dental visits.  -- Suggested having eyes and vision checked if needed or past due.  --Immunizations reviewed.  Are up-to-date  --Discussed benefits of screening colonoscopy.  She would like Cologuard testing      GISELA [facility]  [address] [place] [id number]  Dept: [phone]  Dept Fax: [phone]  Loc: [phone]  Loc Fax: [phone]           Orders Only on 06/02/2020   Component Date Value Ref Range Status   • Glucose 06/03/2020 105* 65 - 99 mg/dL Final   • BUN 06/03/2020 11  6 - 20 mg/dL Final   • Creatinine 06/03/2020 0.88  0.57 - 1.00 mg/dL Final   • eGFR Non  Am 06/03/2020 68  >60 mL/min/1.73 Final   • eGFR African Am 06/03/2020 83  >60  mL/min/1.73 Final   • BUN/Creatinine Ratio 06/03/2020 12.5  7.0 - 25.0 Final   • Sodium 06/03/2020 134* 136 - 145 mmol/L Final   • Potassium 06/03/2020 4.0  3.5 - 5.2 mmol/L Final   • Chloride 06/03/2020 97* 98 - 107 mmol/L Final   • Total CO2 06/03/2020 29.1* 22.0 - 29.0 mmol/L Final   • Calcium 06/03/2020 9.6  8.6 - 10.5 mg/dL Final   • Total Protein 06/03/2020 7.4  6.0 - 8.5 g/dL Final   • Albumin 06/03/2020 4.30  3.50 - 5.20 g/dL Final   • Globulin 06/03/2020 3.1  gm/dL Final   • A/G Ratio 06/03/2020 1.4  g/dL Final   • Total Bilirubin 06/03/2020 0.5  0.2 - 1.2 mg/dL Final   • Alkaline Phosphatase 06/03/2020 68  39 - 117 U/L Final   • AST (SGOT) 06/03/2020 18  1 - 32 U/L Final   • ALT (SGPT) 06/03/2020 32  1 - 33 U/L Final   • WBC 06/03/2020 10.18  3.40 - 10.80 10*3/mm3 Final   • RBC 06/03/2020 4.07  3.77 - 5.28 10*6/mm3 Final   • Hemoglobin 06/03/2020 13.6  12.0 - 15.9 g/dL Final   • Hematocrit 06/03/2020 40.5  34.0 - 46.6 % Final   • MCV 06/03/2020 99.5* 79.0 - 97.0 fL Final   • MCH 06/03/2020 33.4* 26.6 - 33.0 pg Final   • MCHC 06/03/2020 33.6  31.5 - 35.7 g/dL Final   • RDW 06/03/2020 12.3  12.3 - 15.4 % Final   • Platelets 06/03/2020 354  140 - 450 10*3/mm3 Final   • Neutrophil Rel % 06/03/2020 74.4  42.7 - 76.0 % Final   • Lymphocyte Rel % 06/03/2020 15.8* 19.6 - 45.3 % Final   • Monocyte Rel % 06/03/2020 7.9  5.0 - 12.0 % Final   • Eosinophil Rel % 06/03/2020 0.9  0.3 - 6.2 % Final   • Basophil Rel % 06/03/2020 0.5  0.0 - 1.5 % Final   • Neutrophils Absolute 06/03/2020 7.58* 1.70 - 7.00 10*3/mm3 Final   • Lymphocytes Absolute 06/03/2020 1.61  0.70 - 3.10 10*3/mm3 Final   • Monocytes Absolute 06/03/2020 0.80  0.10 - 0.90 10*3/mm3 Final   • Eosinophils Absolute 06/03/2020 0.09  0.00 - 0.40 10*3/mm3 Final   • Basophils Absolute 06/03/2020 0.05  0.00 - 0.20 10*3/mm3 Final   • Immature Granulocyte Rel % 06/03/2020 0.5  0.0 - 0.5 % Final   • Immature Grans Absolute 06/03/2020 0.05  0.00 - 0.05 10*3/mm3 Final    • nRBC 06/03/2020 0.0  0.0 - 0.2 /100 WBC Final   • Total Cholesterol 06/03/2020 177  0 - 200 mg/dL Final   • Triglycerides 06/03/2020 114  0 - 150 mg/dL Final   • HDL Cholesterol 06/03/2020 40  40 - 60 mg/dL Final   • VLDL Cholesterol 06/03/2020 22.8  mg/dL Final   • LDL Cholesterol  06/03/2020 114* 0 - 100 mg/dL Final   • Chol/HDL Ratio 06/03/2020 4.43   Final   • Hep C Virus Ab 06/03/2020 <0.1  0.0 - 0.9 s/co ratio Final    Comment:                                   Negative:     < 0.8                               Indeterminate: 0.8 - 0.9                                    Positive:     > 0.9   The CDC recommends that a positive HCV antibody result   be followed up with a HCV Nucleic Acid Amplification   test (109690).     • Please note 06/03/2020 Comment   Final    Comment: We have received your request for additional testing or test  verification.  You will be notified if we are unable to process  your request.     • Written Authorization 06/03/2020 Comment   Final    Comment: Written Authorization Received.  Authorization received from WRITTEN REQUEST 06-  Logged by Dixie Hernández

## 2020-06-23 ENCOUNTER — OFFICE VISIT (OUTPATIENT)
Dept: ORTHOPEDIC SURGERY | Facility: CLINIC | Age: 50
End: 2020-06-23

## 2020-06-23 VITALS — WEIGHT: 225 LBS | HEIGHT: 63 IN | BODY MASS INDEX: 39.87 KG/M2

## 2020-06-23 DIAGNOSIS — M17.12 PRIMARY OSTEOARTHRITIS OF LEFT KNEE: Primary | ICD-10-CM

## 2020-06-23 PROCEDURE — 99213 OFFICE O/P EST LOW 20 MIN: CPT | Performed by: NURSE PRACTITIONER

## 2020-06-23 PROCEDURE — 20610 DRAIN/INJ JOINT/BURSA W/O US: CPT | Performed by: NURSE PRACTITIONER

## 2020-06-23 RX ORDER — LIDOCAINE HYDROCHLORIDE 10 MG/ML
8 INJECTION, SOLUTION EPIDURAL; INFILTRATION; INTRACAUDAL; PERINEURAL
Status: COMPLETED | OUTPATIENT
Start: 2020-06-23 | End: 2020-06-23

## 2020-06-23 RX ORDER — TRIAMCINOLONE ACETONIDE 40 MG/ML
80 INJECTION, SUSPENSION INTRA-ARTICULAR; INTRAMUSCULAR
Status: COMPLETED | OUTPATIENT
Start: 2020-06-23 | End: 2020-06-23

## 2020-06-23 RX ADMIN — TRIAMCINOLONE ACETONIDE 80 MG: 40 INJECTION, SUSPENSION INTRA-ARTICULAR; INTRAMUSCULAR at 09:40

## 2020-06-23 RX ADMIN — LIDOCAINE HYDROCHLORIDE 8 ML: 10 INJECTION, SOLUTION EPIDURAL; INFILTRATION; INTRACAUDAL; PERINEURAL at 09:40

## 2020-06-23 NOTE — PROGRESS NOTES
Subjective:     Patient ID: Juliet Fowler is a 50 y.o. female.    Chief Complaint:  Follow-up DJD left knee  History of Present Illness  Jluiet Fowler returns to clinic for follow-up left knee.  She did receive corticosteroid injection on 2/3/2020 with 100% symptom relief for the first 5 weeks and began noticing return of pain.  We did submit for Visco supplementation injection which was denied by health insurance.  She has started taking meloxicam at last visit tolerating well which is helping decrease the pain however she is experiencing pain at the left knee at the anterior aspect of the knee, increased pain noted with activities involving deep flexion, ambulating long distances, transitional activities, standing for extended periods of time.  Rates discomfort 2-3 out of a 10 aching stiff in nature.  Pain is not rating to her groin or to the lateral aspect of her hip.  Denies that the knee is locking, catching or giving away.  She is also continued with acetaminophen, home strengthening exercises of the quads, hamstring, hips.  Denies other concerns present this time.     Social History     Occupational History   • Not on file   Tobacco Use   • Smoking status: Never Smoker   • Smokeless tobacco: Never Used   Substance and Sexual Activity   • Alcohol use: Never     Frequency: Never   • Drug use: Never   • Sexual activity: Defer      Past Medical History:   Diagnosis Date   • Asthma    • Chondromalacia of knee, left    • Heartburn    • Hypercholesterolemia 1/25/2018   • Hypertension    • Obesity (BMI 30-39.9) 7/18/2019     Past Surgical History:   Procedure Laterality Date   • CHOLECYSTECTOMY         Family History   Problem Relation Age of Onset   • Heart disease Maternal Grandmother    • Hypertension Maternal Grandmother    • Lung disease Maternal Grandmother    • Kidney disease Maternal Grandmother    • Prostate cancer Maternal Grandfather    • Heart disease Paternal Grandfather    • Hypertension Father    •  "Breast cancer Neg Hx          Review of Systems   Constitutional: Negative for chills, diaphoresis, fever and unexpected weight change.   HENT: Negative for hearing loss, nosebleeds, sore throat and tinnitus.    Eyes: Negative for pain and visual disturbance.   Respiratory: Negative for cough, shortness of breath and wheezing.    Cardiovascular: Negative for chest pain and palpitations.   Gastrointestinal: Negative for abdominal pain, diarrhea, nausea and vomiting.   Endocrine: Negative for cold intolerance, heat intolerance and polydipsia.   Genitourinary: Negative for difficulty urinating, dysuria and hematuria.   Musculoskeletal: Positive for arthralgias and myalgias. Negative for joint swelling.   Skin: Negative for rash and wound.   Allergic/Immunologic: Negative for environmental allergies.   Neurological: Negative for dizziness, syncope and numbness.   Hematological: Does not bruise/bleed easily.   Psychiatric/Behavioral: Negative for dysphoric mood and sleep disturbance. The patient is not nervous/anxious.            Objective:  Physical Exam    General: No acute distress.  Eyes: conjunctiva clear; pupils equally round and reactive  ENT: external ears and nose atraumatic; oropharynx clear  CV: no peripheral edema  Resp: normal respiratory effort  Skin: no rashes or wounds; normal turgor  Psych: mood and affect appropriate; recent and remote memory intact    Vitals:    06/23/20 0924   Weight: 102 kg (225 lb)   Height: 160 cm (63\")         06/23/20  0924   Weight: 102 kg (225 lb)     Body mass index is 39.86 kg/m².      Left Knee Exam     Tenderness   The patient is experiencing tenderness in the patella and medial joint line.    Range of Motion   Extension: 0   Flexion: 130     Tests   Yue:  Medial - negative Lateral - negative  Varus: negative Valgus: negative  Lachman:  Anterior - 1+    Posterior - negative  Drawer:  Anterior - negative     Posterior - negative  Patellar apprehension: positive    Other "   Erythema: absent  Sensation: normal  Pulse: present  Swelling: moderate    Comments:  Positive crepitus throughout arc of motion  Positive active patellar compression test             Assessment:        1. Primary osteoarthritis of left knee           Plan:  1.  Discussed plan of care with patient.  We will proceed with corticosteroid injection in the left knee I do recommend application of ice to the injection site this evening.  Discussed will take every bit of 3 to 7 days before the steroid begins to work.  We will submit again for Visco supplementation injections for the left knee.  Plan to start after authorized in 6 weeks.  Provided patient with Visco information sheet what to expect.  She verbalized understanding of all information agrees with plan of care.  Denies other concerns present this time.  Large Joint Arthrocentesis: L knee  Date/Time: 6/23/2020 9:40 AM  Consent given by: patient  Site marked: site marked  Timeout: Immediately prior to procedure a time out was called to verify the correct patient, procedure, equipment, support staff and site/side marked as required   Supporting Documentation  Indications: pain   Procedure Details  Location: knee - L knee  Preparation: Patient was prepped and draped in the usual sterile fashion  Needle size: 22 G  Approach: superior (LATERAL)  Medications administered: 8 mL lidocaine PF 1% 1 %; 80 mg triamcinolone acetonide 40 MG/ML  Patient tolerance: patient tolerated the procedure well with no immediate complications          Orders:  Orders Placed This Encounter   Procedures   • Visco Treatment       Medications:  No orders of the defined types were placed in this encounter.      Followup:  No follow-ups on file.    Juliet was seen today for follow-up and pain.    Diagnoses and all orders for this visit:    Primary osteoarthritis of left knee  -     Visco Treatment; Future      Dictated utilizing Dragon dictation

## 2020-07-15 DIAGNOSIS — I10 ESSENTIAL HYPERTENSION: ICD-10-CM

## 2020-07-15 RX ORDER — VALSARTAN AND HYDROCHLOROTHIAZIDE 80; 12.5 MG/1; MG/1
TABLET, FILM COATED ORAL
Qty: 30 TABLET | Refills: 0 | Status: SHIPPED | OUTPATIENT
Start: 2020-07-15 | End: 2020-08-17

## 2020-08-05 ENCOUNTER — CLINICAL SUPPORT (OUTPATIENT)
Dept: ORTHOPEDIC SURGERY | Facility: CLINIC | Age: 50
End: 2020-08-05

## 2020-08-05 DIAGNOSIS — M17.12 PRIMARY OSTEOARTHRITIS OF LEFT KNEE: Primary | ICD-10-CM

## 2020-08-05 PROCEDURE — 20610 DRAIN/INJ JOINT/BURSA W/O US: CPT | Performed by: NURSE PRACTITIONER

## 2020-08-05 NOTE — PROGRESS NOTES
Large Joint Arthrocentesis: L knee  Date/Time: 8/5/2020 9:03 AM  Consent given by: patient  Site marked: site marked  Timeout: Immediately prior to procedure a time out was called to verify the correct patient, procedure, equipment, support staff and site/side marked as required   Supporting Documentation  Indications: pain   Procedure Details  Location: knee - L knee  Preparation: Patient was prepped and draped in the usual sterile fashion  Needle size: 20 G  Approach: superolateral   Medications administered: 30 mg Cross-Linked Hyaluronate 30 MG/3ML  Patient tolerance: patient tolerated the procedure well with no immediate complications          Patient presents to clinic today for left knee viscosupplement injections.  This is the single injection of the series.  I explained details of injections as well as risks, benefits and alternatives with the patient today, had all questions answered, wished to proceed with injections.  I will see patient back in 6 weeks for reevaluation.  Patient was instructed to watch for signs or symptoms of infection including redness, swelling, warmth to the touch, or significant increased pain and to contact our office immediately if any of these issues were noted.

## 2020-08-05 NOTE — PATIENT INSTRUCTIONS
Post-injection instructions    ? Apply ice to the injection side as needed to relieve pain, 10-15 minutes on and off every few hours.     ? Watch for signs and symptoms of infection, including significantly increased pain, redness, swelling, warmth to the touch, fevers, sweats, chills. If these symptoms occur, please notify our office immediately [phone].    ? Keep the injection site clean. You may remove adhesive bandage once bleeding has stopped.    ? Do not engage in any new or strenuous activities for at least the next 24 hours until soreness has stopped.    ? Some people will receive immediate relief with a steroid injection however it takes several days before the steroid starts working. You may receive 3-4 steroid injections a year, if necessary. If you get no relief with from the injection, we will continue to work with you to explore other treatment options.    ? It will take approximately four weeks after the last (or single) gel injection before you notice symptom relief.    * Some people experience redness or feeling of warmth after receiving corticosteroid injection. If you have diabetes, the corticosteroid injection may temporarily increase your blood sugar levels. Continue to check glucose levels and if not improving, contact your primary care provider for further treatment.   * Please keep mind that this is not a one-size-fits all approach. If you have questions or concerns, contact our office.

## 2020-08-17 DIAGNOSIS — I10 ESSENTIAL HYPERTENSION: ICD-10-CM

## 2020-08-17 RX ORDER — VALSARTAN AND HYDROCHLOROTHIAZIDE 80; 12.5 MG/1; MG/1
TABLET, FILM COATED ORAL
Qty: 30 TABLET | Refills: 0 | Status: SHIPPED | OUTPATIENT
Start: 2020-08-17 | End: 2020-09-21

## 2020-09-20 DIAGNOSIS — I10 ESSENTIAL HYPERTENSION: ICD-10-CM

## 2020-09-21 RX ORDER — VALSARTAN AND HYDROCHLOROTHIAZIDE 80; 12.5 MG/1; MG/1
TABLET, FILM COATED ORAL
Qty: 30 TABLET | Refills: 3 | Status: SHIPPED | OUTPATIENT
Start: 2020-09-21 | End: 2021-01-25

## 2020-11-05 DIAGNOSIS — R12 HEARTBURN: ICD-10-CM

## 2020-11-05 RX ORDER — OMEPRAZOLE 40 MG/1
CAPSULE, DELAYED RELEASE ORAL
Qty: 30 CAPSULE | Refills: 0 | Status: SHIPPED | OUTPATIENT
Start: 2020-11-05 | End: 2020-12-18

## 2020-11-20 ENCOUNTER — TELEPHONE (OUTPATIENT)
Dept: FAMILY MEDICINE CLINIC | Facility: CLINIC | Age: 50
End: 2020-11-20

## 2020-11-20 ENCOUNTER — TELEMEDICINE (OUTPATIENT)
Dept: FAMILY MEDICINE CLINIC | Facility: CLINIC | Age: 50
End: 2020-11-20

## 2020-11-20 VITALS — TEMPERATURE: 100 F | OXYGEN SATURATION: 97 %

## 2020-11-20 DIAGNOSIS — R53.83 FATIGUE, UNSPECIFIED TYPE: Primary | ICD-10-CM

## 2020-11-20 DIAGNOSIS — R50.81 FEVER DUE TO COVID-19: ICD-10-CM

## 2020-11-20 DIAGNOSIS — U07.1 COVID-19 VIRUS DETECTED: ICD-10-CM

## 2020-11-20 DIAGNOSIS — R05.9 COUGH: ICD-10-CM

## 2020-11-20 DIAGNOSIS — U07.1 FEVER DUE TO COVID-19: ICD-10-CM

## 2020-11-20 PROCEDURE — 99213 OFFICE O/P EST LOW 20 MIN: CPT | Performed by: PHYSICIAN ASSISTANT

## 2020-11-20 RX ORDER — BENZONATATE 200 MG/1
200 CAPSULE ORAL 3 TIMES DAILY PRN
Qty: 30 CAPSULE | Refills: 0 | Status: SHIPPED | OUTPATIENT
Start: 2020-11-20 | End: 2021-05-28

## 2020-11-20 RX ORDER — HYALURONATE SOD, CROSS-LINKED 30 MG/3 ML
SYRINGE (ML) INTRAARTICULAR
COMMUNITY
Start: 2020-07-27 | End: 2021-05-28

## 2020-11-20 RX ORDER — AZITHROMYCIN 250 MG/1
TABLET, FILM COATED ORAL
Qty: 6 TABLET | Refills: 0 | Status: SHIPPED | OUTPATIENT
Start: 2020-11-20 | End: 2020-12-09

## 2020-11-20 RX ORDER — NEEDLES, DISPOSABLE 25GX5/8"
NEEDLE, DISPOSABLE MISCELLANEOUS
COMMUNITY
Start: 2020-07-27 | End: 2021-05-28

## 2020-11-20 NOTE — TELEPHONE ENCOUNTER
Patient called in  She tested positive for covid on 11/12.  She is still having the following symptoms cough up a thick mucus, discharge from nose is bloody, fatigue, low grade fever. Patient would like to know if there is something that you would suggest that she take to help with the symptoms.      [facility] [address] [place] [address] - [address] [facility] - [phone]  - [phone]    Please advise     [phone]

## 2020-11-20 NOTE — TELEPHONE ENCOUNTER
Please call patient and see if she can schedule a MyChart video encounter to further evaluate her symptoms.  She may need imaging or additional medication.  Would like to speak with her or see her electronically if possible.

## 2020-11-20 NOTE — PROGRESS NOTES
Subjective   Juliet Fowler is a 50 y.o. female presents for   Chief Complaint   Patient presents with   • Cough     tested positive for Covid on November 12, 2020   • Fever     SOA-slightly   • Fatigue     loss of taste/smell       History of Present Illness     Scarlet is a 50-year-old female who presents using [employer] video encounter.  She started having symptoms on November 10, 2020 that include fatigue, and mild achy sensation.  She went to get tested for COVID-19 on November 12, 2020 at [facility] in [facility].  She was diagnosed with Covid.  States her boyfriend was also positive.  They are currently living at separate houses.  She is now having a thick productive cough, green to yellow rhinorrhea, occasional to slight shortness of air, fevers, body aches, fatigue, loss of taste/smell and slight diarrhea.  Her temperature today was  100 degrees.  Pulse ox has been running around 97-98 at home.  Scarlet has been using her Symbicort inhaler daily.  She takes her albuterol inhaler as needed.  She takes these for asthma issues.  She has been trying to drink water and take over-the-counter Advil and Tylenol.  Her diet has been decreased.  Sleep has been increased.  She is sleeping at least 12 or more hours daily.  Denied any wheezing, vomiting, nausea, ear pain, sore throat, difficulty breathing, or headache.  She works at [employer].  Her boyfriend was a  at [employer].  She has been self quarantine at home since November 10, 2020.  Her mother has been bringing her supplies and leaving on her doorstep.    The following portions of the patient's history were reviewed and updated as appropriate: allergies, current medications, past family history, past medical history, past social history, past surgical history and problem list.    Review of Systems   Constitutional: Positive for fatigue and fever. Negative for chills.   HENT: Positive for rhinorrhea. Negative for ear discharge, ear  pain, postnasal drip, sinus pressure, sinus pain, sneezing, sore throat, trouble swallowing and voice change.    Eyes: Negative.    Respiratory: Positive for cough and shortness of breath. Negative for chest tightness and wheezing.    Cardiovascular: Negative.  Negative for chest pain, palpitations and leg swelling.   Gastrointestinal: Positive for diarrhea. Negative for abdominal pain, constipation, nausea and vomiting.   Endocrine: Negative.    Genitourinary: Negative.    Musculoskeletal: Positive for myalgias.   Skin: Negative.    Allergic/Immunologic: Negative.    Neurological: Negative.  Negative for headaches.   Hematological: Negative.    Psychiatric/Behavioral: Positive for sleep disturbance.   All other systems reviewed and are negative.    Patient was able to provide temperature and pulse ox during the MyChart video encounter.  These are the only vital signs taken due to the telehealth visit.    Vitals:    11/20/20 1109   Temp: 100 °F (37.8 °C)   TempSrc: Oral   SpO2: 97%     Wt Readings from Last 3 Encounters:   06/23/20 102 kg (225 lb)   06/10/20 105 kg (231 lb)   02/18/20 104 kg (229 lb)     BP Readings from Last 3 Encounters:   06/10/20 128/88   02/18/20 120/70   02/03/20 128/88     Social History     Socioeconomic History   • Marital status:      Spouse name: Not on file   • Number of children: Not on file   • Years of education: Not on file   • Highest education level: Not on file   Tobacco Use   • Smoking status: Never Smoker   • Smokeless tobacco: Never Used   Substance and Sexual Activity   • Alcohol use: Never     Frequency: Never   • Drug use: Never   • Sexual activity: Defer       Allergies   Allergen Reactions   • Dust Mite Extract Itching and Unknown - High Severity   • Bird Grass Unknown - High Severity   • Molds & Smuts Unknown - High Severity   • Lisinopril Cough       There is no height or weight on file to calculate BMI.    Objective   Physical Exam  Constitutional:        Appearance: Normal appearance. She is well-developed and well-groomed.      Comments: Slightly ill   HENT:      Head: Normocephalic and atraumatic.      Right Ear: Hearing normal.      Left Ear: Hearing normal.   Neck:      Trachea: Trachea and phonation normal.   Pulmonary:      Comments: She is coughing off and on during the MyChart video encounter.  No wheezing or breathing difficulty noted.  Neurological:      Mental Status: She is alert and oriented to person, place, and time.   Psychiatric:         Attention and Perception: Attention and perception normal.         Mood and Affect: Mood and affect normal.         Speech: Speech normal.         Behavior: Behavior is uncooperative.         Thought Content: Thought content normal.         Cognition and Memory: Cognition and memory normal.         Judgment: Judgment normal.     Physical was limited due to MyChart video encounter.    Assessment/Plan   Diagnoses and all orders for this visit:    1. Fatigue, unspecified type (Primary)  -     azithromycin (Zithromax Z-Lincoln) 250 MG tablet; Take 2 tablets the first day, then 1 tablet daily for 4 days.  Dispense: 6 tablet; Refill: 0  -     benzonatate (TESSALON) 200 MG capsule; Take 1 capsule by mouth 3 (Three) Times a Day As Needed for Cough.  Dispense: 30 capsule; Refill: 0  -     QUESTIONNAIRE SERIES    2. Cough  -     azithromycin (Zithromax Z-Lincoln) 250 MG tablet; Take 2 tablets the first day, then 1 tablet daily for 4 days.  Dispense: 6 tablet; Refill: 0  -     benzonatate (TESSALON) 200 MG capsule; Take 1 capsule by mouth 3 (Three) Times a Day As Needed for Cough.  Dispense: 30 capsule; Refill: 0  -     QUESTIONNAIRE SERIES    3. COVID-19 virus detected  -     azithromycin (Zithromax Z-Lincoln) 250 MG tablet; Take 2 tablets the first day, then 1 tablet daily for 4 days.  Dispense: 6 tablet; Refill: 0  -     benzonatate (TESSALON) 200 MG capsule; Take 1 capsule by mouth 3 (Three) Times a Day As Needed for Cough.  Dispense:  30 capsule; Refill: 0  -     QUESTIONNAIRE SERIES    4. Fever due to COVID-19  -     azithromycin (Zithromax Z-Lincoln) 250 MG tablet; Take 2 tablets the first day, then 1 tablet daily for 4 days.  Dispense: 6 tablet; Refill: 0  -     benzonatate (TESSALON) 200 MG capsule; Take 1 capsule by mouth 3 (Three) Times a Day As Needed for Cough.  Dispense: 30 capsule; Refill: 0  -     QUESTIONNAIRE SERIES    Ms. Juliet Hung was seen using MyChart video encounter today.  She has tested positive for COVID-19 on November 12, 2020.  She continues to have increasing cough, fevers, and fatigue.  I will send to pharmacy [facility] and a Z-Lincoln.  Scarlet will continue her albuterol and Symbicort inhalers at home.  I will consider adding prednisone if she becomes more symptomatic.  She was instructed if her oxygen levels dropped below 90 she will proceed to the emergency room for further evaluation and treatment.  She may take Tylenol for fever and body ache.  Stressed the importance of staying hydrated.  She may take over-the-counter vitamin C and zinc as well.  She will update status on Monday, November 30, 2020.  She will need to continue to quarantine until November 29, 2020.  I have provided her with a work excuse to return on November 30, 2020 if she is asymptomatic.  She voiced understanding.    I spent approximately 15 minutes using my chart video visit discussing her exposure history, signs, symptoms, medication and treatment management with her.      [facility]  [address] [place] [id number]  Dept: [phone]  Dept Fax: [phone]  Loc: [phone]  Loc Fax: [phone]

## 2020-11-20 NOTE — PATIENT INSTRUCTIONS
1.  Take Tylenol for fever and body aches.  Take over-the-counter vitamin C and zinc as well.  2.  Increase water intake.  3.  Take over-the-counter plain Mucinex for cough.    How to Quarantine at Home  Information for Patients and Families    These instructions are for people with confirmed or suspected COVID-19 who do not need to be hospitalized and those with confirmed COVID-19 who were hospitalized and discharged to care for themselves at home.    If you were tested through the Health Department  The Health Department will monitor your wellbeing.  If it is determined that you do not need to be hospitalized and can be isolated at home, you will be monitored by staff from your local or state health department.     If you were tested through a Commercial Lab  You will need to monitor yourself and report changes in your symptoms to your doctor.  See the section below called Monitor Your Symptoms.    Follow these steps until a healthcare provider or local or state health department says you can return to your normal activities.    Stay home except to get medical care  • Restrict activities outside your home, except for getting medical care.   • Do not go to work, school, or public areas.   • Avoid using public transportation, ride-sharing, or taxis.    Separate yourself from other people and animals in your home  People  As much as possible, you should stay in a specific room and away from other people in your home. Also, you should use a separate bathroom, if available.    Animals  You should restrict contact with pets and other animals while you are sick with COVID-19, just like you would around other people. When possible, have another member of your household care for your animals while you are sick. If you are sick with COVID-19, avoid contact with your pet, including petting, snuggling, being kissed or licked, and sharing food. If you must care for your pet or be around animals while you are sick, wash your  hands before and after you interact with pets and wear a facemask. See COVID-19 and Animals for more information.    Call ahead before visiting your doctor  If you have a medical appointment, call the healthcare provider and tell them that you have or may have COVID-19. This information will help the healthcare provider’s office take steps to keep other people from getting infected or exposed.    Wear a facemask  You should wear a facemask when you are around other people (e.g., sharing a room or vehicle) or pets and before you enter a healthcare provider’s office.     If you are not able to wear a facemask (for example, because it causes trouble breathing), then people who live with you should not stay in the same room with you, or they should wear a facemask if they enter your room.    Cover your coughs and sneezes  • Cover your mouth and nose with a tissue when you cough or sneeze.   • Throw used tissues in a lined trash can.   • Immediately wash your hands with soap and water for at least 20 seconds or, if soap and water are not available, clean your hands with an alcohol-based hand  that contains at least 60% alcohol.    Clean your hands often  • Wash your hands often with soap and water for at least 20 seconds, especially after blowing your nose, coughing, or sneezing; going to the bathroom; and before eating or preparing food.     • If soap and water are not readily available, use an alcohol-based hand  with at least 60% alcohol, covering all surfaces of your hands and rubbing them together until they feel dry.    • Soap and water are the best option if hands are visibly dirty. Avoid touching your eyes, nose, and mouth with unwashed hands.    Avoid sharing personal household items  • You should not share dishes, drinking glasses, cups, eating utensils, towels, or bedding with other people or pets in your home.   • After using these items, they should be washed thoroughly with soap and  water.    Clean all “high-touch” surfaces everyday  • High touch surfaces include counters, tabletops, doorknobs, bathroom fixtures, toilets, phones, keyboards, tablets, and bedside tables.   • Also, clean any surfaces that may have blood, stool, or body fluids on them.   • Use a household cleaning spray or wipe, according to the label instructions. Labels contain instructions for safe and effective use of the cleaning product, including precautions you should take when applying the product, such as wearing gloves and making sure you have good ventilation during use of the product.    Monitor your symptoms  • Seek prompt medical attention if your illness is worsening (e.g., difficulty breathing).   • Before seeking care, call your healthcare provider and tell them that you have, or are being evaluated for, COVID-19.   • Put on a facemask before you enter the facility.     • These steps will help the healthcare provider’s office to keep other people in the office or waiting room from getting infected or exposed.   • Persons who are placed under active monitoring or facilitated self-monitoring should follow instructions provided by their local health department or occupational health professionals, as appropriate.  • If you have a medical emergency and need to call 911, notify the dispatch personnel that you have, or are being evaluated for COVID-19. If possible, put on a facemask before emergency medical services arrive.    Discontinuing home isolation  Patients with confirmed COVID-19 should remain under home isolation precautions until the risk of secondary transmission to others is thought to be low. The decision to discontinue home isolation precautions should be made on a case-by-case basis, in consultation with healthcare providers and state and local health departments.    The below content are for household members, intimate partners, and caregivers of a patient with symptomatic laboratory-confirmed COVID-19  or a patient under investigation:    Household members, intimate partners, and caregivers may have close contact with a person with symptomatic, laboratory-confirmed COVID-19 or a person under investigation.     Close contacts should monitor their health; they should call their healthcare provider right away if they develop symptoms suggestive of COVID-19 (e.g., fever, cough, shortness of breath)     Close contacts should also follow these recommendations:  • Make sure that you understand and can help the patient follow their healthcare provider’s instructions for medication(s) and care. You should help the patient with basic needs in the home and provide support for getting groceries, prescriptions, and other personal needs.  • Monitor the patient’s symptoms. If the patient is getting sicker, call his or her healthcare provider and tell them that the patient has laboratory-confirmed COVID-19. This will help the healthcare provider’s office take steps to keep other people in the office or waiting room from getting infected. Ask the healthcare provider to call the local or [facility] health department for additional guidance. If the patient has a medical emergency and you need to call 911, notify the dispatch personnel that the patient has, or is being evaluated for COVID-19.  • Household members should stay in another room or be  from the patient as much as possible. Household members should use a separate bedroom and bathroom, if available.  • Prohibit visitors who do not have an essential need to be in the home.  • Household members should care for any pets in the home. Do not handle pets or other animals while sick.  For more information, see COVID-19 and Animals.  • Make sure that shared spaces in the home have good air flow, such as by an air conditioner or an opened window, weather permitting.  • Perform hand hygiene frequently. Wash your hands often with soap and water for at least 20 seconds or use an  alcohol-based hand  that contains 60 to 95% alcohol, covering all surfaces of your hands and rubbing them together until they feel dry. Soap and water should be used preferentially if hands are visibly dirty.  • Avoid touching your eyes, nose, and mouth with unwashed hands.  • The patient should wear a facemask when you are around other people. If the patient is not able to wear a facemask (for example, because it causes trouble breathing), you, as the caregiver, should wear a mask when you are in the same room as the patient.  • Wear a disposable facemask and gloves when you touch or have contact with the patient’s blood, stool, or body fluids, such as saliva, sputum, nasal mucus, vomit, or urine.   o Throw out disposable facemasks and gloves after using them. Do not reuse.  o When removing personal protective equipment, first remove and dispose of gloves. Then, immediately clean your hands with soap and water or alcohol-based hand . Next, remove and dispose of facemask, and immediately clean your hands again with soap and water or alcohol-based hand .  • Avoid sharing household items with the patient. You should not share dishes, drinking glasses, cups, eating utensils, towels, bedding, or other items. After the patient uses these items, you should wash them thoroughly (see below “Wash laundry thoroughly”).  • Clean all “high-touch” surfaces, such as counters, tabletops, doorknobs, bathroom fixtures, toilets, phones, keyboards, tablets, and bedside tables, every day. Also, clean any surfaces that may have blood, stool, or body fluids on them.   o Use a household cleaning spray or wipe, according to the label instructions. Labels contain instructions for safe and effective use of the cleaning product including precautions you should take when applying the product, such as wearing gloves and making sure you have good ventilation during use of the product.  • Wash laundry thoroughly.    o Immediately remove and wash clothes or bedding that have blood, stool, or body fluids on them.  o Wear disposable gloves while handling soiled items and keep soiled items away from your body. Clean your hands (with soap and water or an alcohol-based hand ) immediately after removing your gloves.  o Read and follow directions on labels of laundry or clothing items and detergent. In general, using a normal laundry detergent according to washing machine instructions and dry thoroughly using the warmest temperatures recommended on the clothing label.  • Place all used disposable gloves, facemasks, and other contaminated items in a lined container before disposing of them with other household waste. Clean your hands (with soap and water or an alcohol-based hand ) immediately after handling these items. Soap and water should be used preferentially if hands are visibly dirty.  • Discuss any additional questions with your state or local health department or healthcare provider.    Adapted from information provided by the Centers for Disease Control and Prevention.  For more information, visit https://www.cdc.gov/coronavirus/2019-ncov/hcp/guidance-prevent-spread.html

## 2020-11-30 DIAGNOSIS — R53.82 CHRONIC FATIGUE: ICD-10-CM

## 2020-11-30 DIAGNOSIS — E78.00 HYPERCHOLESTEROLEMIA: ICD-10-CM

## 2020-11-30 DIAGNOSIS — I10 ESSENTIAL HYPERTENSION: ICD-10-CM

## 2020-11-30 DIAGNOSIS — R03.0 BORDERLINE HYPERTENSION: Primary | ICD-10-CM

## 2020-12-02 LAB
ALBUMIN SERPL-MCNC: 4.1 G/DL (ref 3.5–5.2)
ALBUMIN/GLOB SERPL: 1.3 G/DL
ALP SERPL-CCNC: 80 U/L (ref 39–117)
ALT SERPL-CCNC: 34 U/L (ref 1–33)
AST SERPL-CCNC: 25 U/L (ref 1–32)
BASOPHILS # BLD AUTO: 0.06 10*3/MM3 (ref 0–0.2)
BASOPHILS NFR BLD AUTO: 0.6 % (ref 0–1.5)
BILIRUB SERPL-MCNC: 0.6 MG/DL (ref 0–1.2)
BUN SERPL-MCNC: 7 MG/DL (ref 6–20)
BUN/CREAT SERPL: 7.4 (ref 7–25)
CALCIUM SERPL-MCNC: 9.4 MG/DL (ref 8.6–10.5)
CHLORIDE SERPL-SCNC: 96 MMOL/L (ref 98–107)
CHOLEST SERPL-MCNC: 203 MG/DL (ref 0–200)
CHOLEST/HDLC SERPL: 4.23 {RATIO}
CO2 SERPL-SCNC: 31 MMOL/L (ref 22–29)
CREAT SERPL-MCNC: 0.94 MG/DL (ref 0.57–1)
EOSINOPHIL # BLD AUTO: 0.05 10*3/MM3 (ref 0–0.4)
EOSINOPHIL NFR BLD AUTO: 0.5 % (ref 0.3–6.2)
ERYTHROCYTE [DISTWIDTH] IN BLOOD BY AUTOMATED COUNT: 12.4 % (ref 12.3–15.4)
GLOBULIN SER CALC-MCNC: 3.2 GM/DL
GLUCOSE SERPL-MCNC: 105 MG/DL (ref 65–99)
HCT VFR BLD AUTO: 43.1 % (ref 34–46.6)
HDLC SERPL-MCNC: 48 MG/DL (ref 40–60)
HGB BLD-MCNC: 14.1 G/DL (ref 12–15.9)
IMM GRANULOCYTES # BLD AUTO: 0.07 10*3/MM3 (ref 0–0.05)
IMM GRANULOCYTES NFR BLD AUTO: 0.7 % (ref 0–0.5)
LDLC SERPL CALC-MCNC: 134 MG/DL (ref 0–100)
LYMPHOCYTES # BLD AUTO: 1.41 10*3/MM3 (ref 0.7–3.1)
LYMPHOCYTES NFR BLD AUTO: 13.9 % (ref 19.6–45.3)
MCH RBC QN AUTO: 31.4 PG (ref 26.6–33)
MCHC RBC AUTO-ENTMCNC: 32.7 G/DL (ref 31.5–35.7)
MCV RBC AUTO: 96 FL (ref 79–97)
MONOCYTES # BLD AUTO: 0.85 10*3/MM3 (ref 0.1–0.9)
MONOCYTES NFR BLD AUTO: 8.4 % (ref 5–12)
NEUTROPHILS # BLD AUTO: 7.68 10*3/MM3 (ref 1.7–7)
NEUTROPHILS NFR BLD AUTO: 75.9 % (ref 42.7–76)
NRBC BLD AUTO-RTO: 0 /100 WBC (ref 0–0.2)
PLATELET # BLD AUTO: 475 10*3/MM3 (ref 140–450)
POTASSIUM SERPL-SCNC: 3.9 MMOL/L (ref 3.5–5.2)
PROT SERPL-MCNC: 7.3 G/DL (ref 6–8.5)
RBC # BLD AUTO: 4.49 10*6/MM3 (ref 3.77–5.28)
SODIUM SERPL-SCNC: 135 MMOL/L (ref 136–145)
TRIGL SERPL-MCNC: 118 MG/DL (ref 0–150)
VLDLC SERPL CALC-MCNC: 21 MG/DL (ref 5–40)
WBC # BLD AUTO: 10.12 10*3/MM3 (ref 3.4–10.8)

## 2020-12-03 LAB
HBA1C MFR BLD: 5.83 % (ref 4.8–5.6)
Lab: NORMAL
WRITTEN AUTHORIZATION: NORMAL

## 2020-12-09 ENCOUNTER — OFFICE VISIT (OUTPATIENT)
Dept: FAMILY MEDICINE CLINIC | Facility: CLINIC | Age: 50
End: 2020-12-09

## 2020-12-09 VITALS
WEIGHT: 218 LBS | TEMPERATURE: 97.6 F | OXYGEN SATURATION: 98 % | BODY MASS INDEX: 38.62 KG/M2 | HEART RATE: 105 BPM | RESPIRATION RATE: 16 BRPM | SYSTOLIC BLOOD PRESSURE: 118 MMHG | DIASTOLIC BLOOD PRESSURE: 84 MMHG | HEIGHT: 63 IN

## 2020-12-09 DIAGNOSIS — R05.9 COUGH: ICD-10-CM

## 2020-12-09 DIAGNOSIS — U07.1 REAL TIME REVERSE TRANSCRIPTASE PCR POSITIVE FOR COVID-19 VIRUS: ICD-10-CM

## 2020-12-09 DIAGNOSIS — I10 ESSENTIAL HYPERTENSION: Primary | ICD-10-CM

## 2020-12-09 DIAGNOSIS — Z23 NEED FOR INFLUENZA VACCINATION: ICD-10-CM

## 2020-12-09 DIAGNOSIS — R06.09 DOE (DYSPNEA ON EXERTION): ICD-10-CM

## 2020-12-09 DIAGNOSIS — E78.00 HYPERCHOLESTEROLEMIA: ICD-10-CM

## 2020-12-09 PROCEDURE — 93000 ELECTROCARDIOGRAM COMPLETE: CPT | Performed by: PHYSICIAN ASSISTANT

## 2020-12-09 PROCEDURE — 90686 IIV4 VACC NO PRSV 0.5 ML IM: CPT | Performed by: PHYSICIAN ASSISTANT

## 2020-12-09 PROCEDURE — 99214 OFFICE O/P EST MOD 30 MIN: CPT | Performed by: PHYSICIAN ASSISTANT

## 2020-12-09 PROCEDURE — 90471 IMMUNIZATION ADMIN: CPT | Performed by: PHYSICIAN ASSISTANT

## 2020-12-09 NOTE — PROGRESS NOTES
Subjective   Juliet Fowler is a 50 y.o. female presents for   Chief Complaint   Patient presents with   • Hyperlipidemia     management   • Hypertension     management       History of Present Illness     Scarlet is a 50-year-old female who presents for hypertension hyperlipidemia management.  She has lost 13 pounds since Matilde 10, 2020.  Scarlet states she tested positive for COVID-19 in mid November.  States she is feeling better but still has a cough with shortness of breath with exertion.  Denied any wheezing, fevers, chills, or body aches.  Describes the cough as a dry cough.  Her sleep has returned to normal.  She has not been exercising regularly.  Diet has been slightly decreased.  States her taste and smell have returned.  Bowel movements have been daily without dark black tarry stools.  At office visit today she denied any fevers, chills, chest pain, headache, wheezing, GI upset or swelling of ankles.  She has a history of asthma and has been taking her inhalers.  She has been trying to rinse her mouth out as well.    The following portions of the patient's history were reviewed and updated as appropriate: allergies, current medications, past family history, past medical history, past social history, past surgical history and problem list.    Review of Systems   Constitutional: Negative.    HENT: Negative.    Eyes: Negative.    Respiratory: Positive for cough and shortness of breath. Negative for chest tightness and wheezing.    Cardiovascular: Negative.  Negative for chest pain, palpitations and leg swelling.   Gastrointestinal: Negative.    Endocrine: Negative.    Genitourinary: Negative.    Musculoskeletal: Negative.    Skin: Negative.    Allergic/Immunologic: Negative.    Neurological: Negative.  Negative for dizziness, light-headedness and headaches.   Hematological: Negative.    Psychiatric/Behavioral: Negative.  Negative for sleep disturbance and suicidal ideas.   All other systems reviewed and are  "negative.        Vitals:    12/09/20 0859   BP: 118/84   Pulse: 105   Resp: 16   Temp: 97.6 °F (36.4 °C)   SpO2: 98%   Weight: 98.9 kg (218 lb)   Height: 160 cm (63\")     Wt Readings from Last 3 Encounters:   12/09/20 98.9 kg (218 lb)   06/23/20 102 kg (225 lb)   06/10/20 105 kg (231 lb)     BP Readings from Last 3 Encounters:   12/09/20 118/84   06/10/20 128/88   02/18/20 120/70     Social History     Socioeconomic History   • Marital status:      Spouse name: Not on file   • Number of children: Not on file   • Years of education: Not on file   • Highest education level: Not on file   Tobacco Use   • Smoking status: Never Smoker   • Smokeless tobacco: Never Used   Substance and Sexual Activity   • Alcohol use: Never     Frequency: Never   • Drug use: Never   • Sexual activity: Defer       Allergies   Allergen Reactions   • Dust Mite Extract Itching and Unknown - High Severity   • Bird Grass Unknown - High Severity   • Molds & Smuts Unknown - High Severity   • Lisinopril Cough       Body mass index is 38.62 kg/m².    Objective   Physical Exam  Constitutional:       Appearance: Normal appearance. She is well-developed and well-groomed. She is obese.      Interventions: Face mask in place.   HENT:      Head: Normocephalic and atraumatic.      Right Ear: Hearing, tympanic membrane, ear canal and external ear normal.      Left Ear: Hearing, tympanic membrane, ear canal and external ear normal.   Neck:      Musculoskeletal: Neck supple. No edema.      Thyroid: No thyroid mass, thyromegaly or thyroid tenderness.      Vascular: Normal carotid pulses. No carotid bruit, hepatojugular reflux or JVD.      Trachea: Trachea and phonation normal. No tracheal tenderness.   Cardiovascular:      Rate and Rhythm: Normal rate and regular rhythm.      Heart sounds: Normal heart sounds, S1 normal and S2 normal. No murmur.   Pulmonary:      Effort: Pulmonary effort is normal.      Breath sounds: Normal breath sounds and air " entry.   Abdominal:      General: Bowel sounds are normal.      Palpations: Abdomen is soft. There is no hepatomegaly.      Tenderness: There is no abdominal tenderness.   Musculoskeletal:      Right lower leg: No edema.      Left lower leg: No edema.   Lymphadenopathy:      Cervical: No cervical adenopathy.      Right cervical: No superficial, deep or posterior cervical adenopathy.     Left cervical: No superficial, deep or posterior cervical adenopathy.   Skin:     General: Skin is warm and dry.      Capillary Refill: Capillary refill takes less than 2 seconds.   Neurological:      Mental Status: She is alert and oriented to person, place, and time.   Psychiatric:         Attention and Perception: Attention and perception normal.         Mood and Affect: Mood and affect normal.         Speech: Speech normal.         Behavior: Behavior normal. Behavior is cooperative.         Thought Content: Thought content normal.         Cognition and Memory: Cognition and memory normal.         Judgment: Judgment normal.     I was wearing a surgical mask and face shield during the entire office visit/encounter.          ECG 12 Lead    Date/Time: 12/9/2020 9:25 AM  Performed by: Heidi Moe PA-C  Authorized by: Heidi Moe PA-C   Comparison: not compared with previous ECG   Rhythm: sinus rhythm  Rate: normal  BPM: 93  Conduction: conduction normal  ST Segments: ST segments normal  T Waves: T waves normal  QRS axis: normal    Clinical impression: normal ECG  Comments: Indication: Hypertension and shortness of breath with exertion status post COVID 19 positive  VT int:  148 ms  QRS dur:  84 ms  QT/QTc:  335/386 ms              Assessment/Plan   Diagnoses and all orders for this visit:    1. Essential hypertension (Primary)  -     ECG 12 Lead  -     Adult Transthoracic Echo Complete W/ Cont if Necessary Per Protocol; Future    2. Hypercholesterolemia    3. Need for influenza vaccination  -     FluLaval Quad >6 Months  (0853-0008)    4. PACHECO (dyspnea on exertion)  -     ECG 12 Lead  -     XR Chest PA & Lateral; Future  -     Adult Transthoracic Echo Complete W/ Cont if Necessary Per Protocol; Future    5. Real time reverse transcriptase PCR positive for COVID-19 virus  -     XR Chest PA & Lateral; Future  -     Adult Transthoracic Echo Complete W/ Cont if Necessary Per Protocol; Future    6. Cough  -     XR Chest PA & Lateral; Future  -     Adult Transthoracic Echo Complete W/ Cont if Necessary Per Protocol; Future    1.  Chronic and stable hypertension: I have rechecked her blood pressure at office visit today and got 110/78 in left arm.  Doing well with her current Diovan/hydrochlorothiazide medication.  She will return to office in 6 months.  2.  Chronic and stable hypercholesteremia: I reviewed her labs from December 2, 2020 with her at office visit today.  Cholesterol 203, triglycerides 118, HDL 48, , A1c 5.83 and fasting blood sugar was 105.  The importance of eating healthier by decreasing fatty food and carbohydrates in diet.  We will recheck fasting lab work in 6 months.  3.  New dyspnea is on exertion with cough: She was positive for COVID-19 in mid November.  Her symptoms have started since this time.  In office EKG today showed normal sinus rhythm.  We will proceed with chest x-ray and 2D echocardiogram for further evaluation of her symptoms.  This may be related to her recent Covid 19 positivetest results.  Will consider referral to cardiology in future if warranted.  4.  Need for flu shot: She is given written consent to receive updated flu shot today.    [facility]  [address] [place] [id number]  Dept: [phone]  Dept Fax: [phone]  Loc: [phone]  Loc Fax: [phone]           Orders Only on 11/30/2020   Component Date Value Ref Range Status   • Glucose 12/02/2020 105* 65 - 99 mg/dL Final   • BUN  12/02/2020 7  6 - 20 mg/dL Final   • Creatinine 12/02/2020 0.94  0.57 - 1.00 mg/dL Final   • eGFR Non African Am 12/02/2020 63  >60 mL/min/1.73 Final   • eGFR African Am 12/02/2020 77  >60 mL/min/1.73 Final   • BUN/Creatinine Ratio 12/02/2020 7.4  7.0 - 25.0 Final   • Sodium 12/02/2020 135* 136 - 145 mmol/L Final   • Potassium 12/02/2020 3.9  3.5 - 5.2 mmol/L Final   • Chloride 12/02/2020 96* 98 - 107 mmol/L Final   • Total CO2 12/02/2020 31.0* 22.0 - 29.0 mmol/L Final   • Calcium 12/02/2020 9.4  8.6 - 10.5 mg/dL Final   • Total Protein 12/02/2020 7.3  6.0 - 8.5 g/dL Final   • Albumin 12/02/2020 4.10  3.50 - 5.20 g/dL Final   • Globulin 12/02/2020 3.2  gm/dL Final   • A/G Ratio 12/02/2020 1.3  g/dL Final   • Total Bilirubin 12/02/2020 0.6  0.0 - 1.2 mg/dL Final   • Alkaline Phosphatase 12/02/2020 80  39 - 117 U/L Final   • AST (SGOT) 12/02/2020 25  1 - 32 U/L Final   • ALT (SGPT) 12/02/2020 34* 1 - 33 U/L Final   • WBC 12/02/2020 10.12  3.40 - 10.80 10*3/mm3 Final   • RBC 12/02/2020 4.49  3.77 - 5.28 10*6/mm3 Final   • Hemoglobin 12/02/2020 14.1  12.0 - 15.9 g/dL Final   • Hematocrit 12/02/2020 43.1  34.0 - 46.6 % Final   • MCV 12/02/2020 96.0  79.0 - 97.0 fL Final   • MCH 12/02/2020 31.4  26.6 - 33.0 pg Final   • MCHC 12/02/2020 32.7  31.5 - 35.7 g/dL Final   • RDW 12/02/2020 12.4  12.3 - 15.4 % Final   • Platelets 12/02/2020 475* 140 - 450 10*3/mm3 Final   • Neutrophil Rel % 12/02/2020 75.9  42.7 - 76.0 % Final   • Lymphocyte Rel % 12/02/2020 13.9* 19.6 - 45.3 % Final   • Monocyte Rel % 12/02/2020 8.4  5.0 - 12.0 % Final   • Eosinophil Rel % 12/02/2020 0.5  0.3 - 6.2 % Final   • Basophil Rel % 12/02/2020 0.6  0.0 - 1.5 % Final   • Neutrophils Absolute 12/02/2020 7.68* 1.70 - 7.00 10*3/mm3 Final   • Lymphocytes Absolute 12/02/2020 1.41  0.70 - 3.10 10*3/mm3 Final   • Monocytes Absolute 12/02/2020 0.85  0.10 - 0.90 10*3/mm3 Final   • Eosinophils Absolute 12/02/2020 0.05  0.00 - 0.40 10*3/mm3 Final   • Basophils  Absolute 12/02/2020 0.06  0.00 - 0.20 10*3/mm3 Final   • Immature Granulocyte Rel % 12/02/2020 0.7* 0.0 - 0.5 % Final   • Immature Grans Absolute 12/02/2020 0.07* 0.00 - 0.05 10*3/mm3 Final   • nRBC 12/02/2020 0.0  0.0 - 0.2 /100 WBC Final   • Total Cholesterol 12/02/2020 203* 0 - 200 mg/dL Final   • Triglycerides 12/02/2020 118  0 - 150 mg/dL Final   • HDL Cholesterol 12/02/2020 48  40 - 60 mg/dL Final   • VLDL Cholesterol Robert 12/02/2020 21  5 - 40 mg/dL Final   • LDL Chol Calc ([facility]) 12/02/2020 134* 0 - 100 mg/dL Final   • Chol/HDL Ratio 12/02/2020 4.23   Final   • Hemoglobin A1C 12/02/2020 5.83* 4.80 - 5.60 % Final    Comment: Hemoglobin A1C Ranges:  Increased Risk for Diabetes  5.7% to 6.4%  Diabetes                     >= 6.5%  Diabetic Goal                < 7.0%     • Please note 12/02/2020 Comment   Final    Comment: We have received your request for additional testing or test  verification.  You will be notified if we are unable to process  your request.     • Written Authorization 12/02/2020 Comment   Final    Comment: Written Authorization Received.  Authorization received from WRITTEN REQUEST 12-  Logged by Dixie Hernández

## 2020-12-10 ENCOUNTER — HOSPITAL ENCOUNTER (OUTPATIENT)
Dept: GENERAL RADIOLOGY | Facility: HOSPITAL | Age: 50
Discharge: HOME | End: 2020-12-10
Admitting: PHYSICIAN ASSISTANT

## 2020-12-10 DIAGNOSIS — R06.09 DOE (DYSPNEA ON EXERTION): ICD-10-CM

## 2020-12-10 DIAGNOSIS — R05.9 COUGH: ICD-10-CM

## 2020-12-10 DIAGNOSIS — U07.1 REAL TIME REVERSE TRANSCRIPTASE PCR POSITIVE FOR COVID-19 VIRUS: ICD-10-CM

## 2020-12-10 PROCEDURE — 71046 X-RAY EXAM CHEST 2 VIEWS: CPT

## 2020-12-16 ENCOUNTER — HOSPITAL ENCOUNTER (OUTPATIENT)
Dept: CARDIOLOGY | Facility: HOSPITAL | Age: 50
Discharge: HOME | End: 2020-12-16
Admitting: PHYSICIAN ASSISTANT

## 2020-12-16 ENCOUNTER — APPOINTMENT (OUTPATIENT)
Dept: CARDIOLOGY | Facility: HOSPITAL | Age: 50
End: 2020-12-16

## 2020-12-16 VITALS
SYSTOLIC BLOOD PRESSURE: 154 MMHG | HEIGHT: 63 IN | DIASTOLIC BLOOD PRESSURE: 98 MMHG | WEIGHT: 218 LBS | BODY MASS INDEX: 38.62 KG/M2

## 2020-12-16 DIAGNOSIS — R05.9 COUGH: ICD-10-CM

## 2020-12-16 DIAGNOSIS — U07.1 REAL TIME REVERSE TRANSCRIPTASE PCR POSITIVE FOR COVID-19 VIRUS: ICD-10-CM

## 2020-12-16 DIAGNOSIS — I10 ESSENTIAL HYPERTENSION: ICD-10-CM

## 2020-12-16 DIAGNOSIS — R06.09 DOE (DYSPNEA ON EXERTION): ICD-10-CM

## 2020-12-16 LAB
AORTIC DIMENSIONLESS INDEX: 0.8 (DI)
BH CV ECHO MEAS - ACS: 2.2 CM
BH CV ECHO MEAS - AO MAX PG: 12 MMHG
BH CV ECHO MEAS - AO MEAN PG (FULL): 4 MMHG
BH CV ECHO MEAS - AO MEAN PG: 6 MMHG
BH CV ECHO MEAS - AO ROOT AREA (BSA CORRECTED): 1.5
BH CV ECHO MEAS - AO ROOT AREA: 7.5 CM^2
BH CV ECHO MEAS - AO ROOT DIAM: 3.1 CM
BH CV ECHO MEAS - AO V2 MAX: 174 CM/SEC
BH CV ECHO MEAS - AO V2 MEAN: 112 CM/SEC
BH CV ECHO MEAS - AO V2 VTI: 33.7 CM
BH CV ECHO MEAS - ASC AORTA: 2.8 CM
BH CV ECHO MEAS - AVA(I,A): 2 CM^2
BH CV ECHO MEAS - AVA(I,D): 2 CM^2
BH CV ECHO MEAS - BSA(HAYCOCK): 2.1 M^2
BH CV ECHO MEAS - BSA: 2 M^2
BH CV ECHO MEAS - BZI_BMI: 38.6 KILOGRAMS/M^2
BH CV ECHO MEAS - BZI_METRIC_HEIGHT: 160 CM
BH CV ECHO MEAS - BZI_METRIC_WEIGHT: 98.9 KG
BH CV ECHO MEAS - EDV(MOD-SP2): 74 ML
BH CV ECHO MEAS - EDV(MOD-SP4): 79 ML
BH CV ECHO MEAS - EF(MOD-BP): 63 %
BH CV ECHO MEAS - EF(MOD-SP2): 60.8 %
BH CV ECHO MEAS - EF(MOD-SP4): 63.3 %
BH CV ECHO MEAS - ESV(MOD-SP2): 29 ML
BH CV ECHO MEAS - ESV(MOD-SP4): 29 ML
BH CV ECHO MEAS - LAT PEAK E' VEL: 11 CM/SEC
BH CV ECHO MEAS - LV DIASTOLIC VOL/BSA (35-75): 39.4 ML/M^2
BH CV ECHO MEAS - LV MAX PG: 6 MMHG
BH CV ECHO MEAS - LV MEAN PG: 2 MMHG
BH CV ECHO MEAS - LV SYSTOLIC VOL/BSA (12-30): 14.5 ML/M^2
BH CV ECHO MEAS - LV V1 MAX: 120 CM/SEC
BH CV ECHO MEAS - LV V1 MEAN: 72.1 CM/SEC
BH CV ECHO MEAS - LV V1 VTI: 21.8 CM
BH CV ECHO MEAS - LVLD AP2: 7.7 CM
BH CV ECHO MEAS - LVLD AP4: 8.1 CM
BH CV ECHO MEAS - LVLS AP2: 6.4 CM
BH CV ECHO MEAS - LVLS AP4: 6.3 CM
BH CV ECHO MEAS - LVOT AREA (M): 3.1 CM^2
BH CV ECHO MEAS - LVOT AREA: 3.1 CM^2
BH CV ECHO MEAS - LVOT DIAM: 2 CM
BH CV ECHO MEAS - MED PEAK E' VEL: 9 CM/SEC
BH CV ECHO MEAS - MV A DUR: 0.15 SEC
BH CV ECHO MEAS - MV A MAX VEL: 78 CM/SEC
BH CV ECHO MEAS - MV DEC SLOPE: 379 CM/SEC^2
BH CV ECHO MEAS - MV DEC TIME: 190 SEC
BH CV ECHO MEAS - MV E MAX VEL: 93.6 CM/SEC
BH CV ECHO MEAS - MV E/A: 1.2
BH CV ECHO MEAS - MV MEAN PG: 2 MMHG
BH CV ECHO MEAS - MV P1/2T MAX VEL: 113 CM/SEC
BH CV ECHO MEAS - MV P1/2T: 87.3 MSEC
BH CV ECHO MEAS - MV V2 MEAN: 65.4 CM/SEC
BH CV ECHO MEAS - MV V2 VTI: 30.5 CM
BH CV ECHO MEAS - MVA P1/2T LCG: 1.9 CM^2
BH CV ECHO MEAS - MVA(P1/2T): 2.5 CM^2
BH CV ECHO MEAS - MVA(VTI): 2.2 CM^2
BH CV ECHO MEAS - PA ACC SLOPE: 1914 CM/SEC^2
BH CV ECHO MEAS - PA ACC TIME: 0.06 SEC
BH CV ECHO MEAS - PA MAX PG (FULL): 3 MMHG
BH CV ECHO MEAS - PA MAX PG: 5.2 MMHG
BH CV ECHO MEAS - PA PR(ACCEL): 52.9 MMHG
BH CV ECHO MEAS - PA V2 MAX: 114 CM/SEC
BH CV ECHO MEAS - PULM A REVS DUR: 0.1 SEC
BH CV ECHO MEAS - PULM A REVS VEL: 26.7 CM/SEC
BH CV ECHO MEAS - PULM DIAS VEL: 43.9 CM/SEC
BH CV ECHO MEAS - PULM S/D: 1.7
BH CV ECHO MEAS - PULM SYS VEL: 73.5 CM/SEC
BH CV ECHO MEAS - PVA(V,A): 1.9 CM^2
BH CV ECHO MEAS - PVA(V,D): 1.9 CM^2
BH CV ECHO MEAS - QP/QS: 0.66
BH CV ECHO MEAS - RAP SYSTOLE: 8 MMHG
BH CV ECHO MEAS - RV MAX PG: 2.2 MMHG
BH CV ECHO MEAS - RV MEAN PG: 1 MMHG
BH CV ECHO MEAS - RV V1 MAX: 74.4 CM/SEC
BH CV ECHO MEAS - RV V1 MEAN: 47 CM/SEC
BH CV ECHO MEAS - RV V1 VTI: 15.9 CM
BH CV ECHO MEAS - RVOT AREA: 2.8 CM^2
BH CV ECHO MEAS - RVOT DIAM: 1.9 CM
BH CV ECHO MEAS - RVSP: 33.4 MMHG
BH CV ECHO MEAS - SI(AO): 126.8 ML/M^2
BH CV ECHO MEAS - SI(LVOT): 34.1 ML/M^2
BH CV ECHO MEAS - SI(MOD-SP2): 22.4 ML/M^2
BH CV ECHO MEAS - SI(MOD-SP4): 24.9 ML/M^2
BH CV ECHO MEAS - SV(AO): 254.4 ML
BH CV ECHO MEAS - SV(LVOT): 68.5 ML
BH CV ECHO MEAS - SV(MOD-SP2): 45 ML
BH CV ECHO MEAS - SV(MOD-SP4): 50 ML
BH CV ECHO MEAS - SV(RVOT): 45.1 ML
BH CV ECHO MEAS - TAPSE (>1.6): 1.2 CM
BH CV ECHO MEAS - TR MAX VEL: 252 CM/SEC
BH CV ECHO MEASUREMENTS AVERAGE E/E' RATIO: 9.36
BH CV VAS BP RIGHT ARM: NORMAL MMHG
BH CV XLRA - RV BASE: 3.2 CM
BH CV XLRA - RV LENGTH: 7.4 CM
BH CV XLRA - RV MID: 2.4 CM
BH CV XLRA - TDI S': 13 CM/SEC
LEFT ATRIUM VOLUME INDEX: 20 ML/M2
MAXIMAL PREDICTED HEART RATE: 171 BPM
STRESS TARGET HR: 145 BPM

## 2020-12-16 PROCEDURE — 93306 TTE W/DOPPLER COMPLETE: CPT

## 2020-12-16 PROCEDURE — 93306 TTE W/DOPPLER COMPLETE: CPT | Performed by: INTERNAL MEDICINE

## 2020-12-18 DIAGNOSIS — R12 HEARTBURN: ICD-10-CM

## 2020-12-18 RX ORDER — OMEPRAZOLE 40 MG/1
CAPSULE, DELAYED RELEASE ORAL
Qty: 30 CAPSULE | Refills: 6 | Status: SHIPPED | OUTPATIENT
Start: 2020-12-18 | End: 2021-12-16

## 2021-01-25 ENCOUNTER — TRANSCRIBE ORDERS (OUTPATIENT)
Dept: MAMMOGRAPHY | Facility: HOSPITAL | Age: 51
End: 2021-01-25

## 2021-01-25 DIAGNOSIS — Z12.31 SCREENING MAMMOGRAM, ENCOUNTER FOR: Primary | ICD-10-CM

## 2021-01-25 DIAGNOSIS — I10 ESSENTIAL HYPERTENSION: ICD-10-CM

## 2021-01-25 RX ORDER — VALSARTAN AND HYDROCHLOROTHIAZIDE 80; 12.5 MG/1; MG/1
TABLET, FILM COATED ORAL
Qty: 30 TABLET | Refills: 6 | Status: SHIPPED | OUTPATIENT
Start: 2021-01-25 | End: 2021-12-16

## 2021-02-02 ENCOUNTER — HOSPITAL ENCOUNTER (OUTPATIENT)
Dept: MAMMOGRAPHY | Facility: HOSPITAL | Age: 51
Discharge: HOME | End: 2021-02-02
Admitting: PHYSICIAN ASSISTANT

## 2021-02-02 DIAGNOSIS — Z12.31 SCREENING MAMMOGRAM, ENCOUNTER FOR: ICD-10-CM

## 2021-02-02 PROCEDURE — 77067 SCR MAMMO BI INCL CAD: CPT

## 2021-02-02 PROCEDURE — 77063 BREAST TOMOSYNTHESIS BI: CPT

## 2021-02-04 ENCOUNTER — TELEPHONE (OUTPATIENT)
Dept: ORTHOPEDIC SURGERY | Facility: CLINIC | Age: 51
End: 2021-02-04

## 2021-02-04 NOTE — TELEPHONE ENCOUNTER
Caller: Juliet Fowler    Relationship to patient: Self    Best call back number:      Chief complaint: PATIENT WANTS TO SCHEDULE ANOTHER LEFT KNEE GEL INJECTION w/YUSUF IZAGUIRRE     Type of visit: FOLLOW UP / LEFT KNEE / NO NEW INJURY / WANTS GEL INJECTION (PREVIOUS 8/05/20)     Requested date: ELIGIBLE AFTER 02/07/21 - AVAILABLE ANY DAY EXCEPT NOT MONDAYS AND PREFERS AMs AROUND 0900     Additional notes:PATIENT STATED HER [facility] IS NO LONGER COVERING THE GEL-1 MEDICATION.     PATIENT CALL BACK [phone]    THANKS

## 2021-02-09 ENCOUNTER — OFFICE VISIT (OUTPATIENT)
Dept: ORTHOPEDIC SURGERY | Facility: CLINIC | Age: 51
End: 2021-02-09

## 2021-02-09 VITALS — BODY MASS INDEX: 38.62 KG/M2 | HEIGHT: 63 IN | WEIGHT: 218 LBS

## 2021-02-09 DIAGNOSIS — M17.12 PRIMARY OSTEOARTHRITIS OF LEFT KNEE: Primary | ICD-10-CM

## 2021-02-09 PROCEDURE — 99214 OFFICE O/P EST MOD 30 MIN: CPT | Performed by: NURSE PRACTITIONER

## 2021-02-09 PROCEDURE — 20610 DRAIN/INJ JOINT/BURSA W/O US: CPT | Performed by: NURSE PRACTITIONER

## 2021-02-09 RX ORDER — LIDOCAINE HYDROCHLORIDE 10 MG/ML
8 INJECTION, SOLUTION EPIDURAL; INFILTRATION; INTRACAUDAL; PERINEURAL
Status: COMPLETED | OUTPATIENT
Start: 2021-02-09 | End: 2021-02-09

## 2021-02-09 RX ORDER — TRIAMCINOLONE ACETONIDE 40 MG/ML
80 INJECTION, SUSPENSION INTRA-ARTICULAR; INTRAMUSCULAR
Status: COMPLETED | OUTPATIENT
Start: 2021-02-09 | End: 2021-02-09

## 2021-02-09 RX ORDER — MELOXICAM 15 MG/1
15 TABLET ORAL DAILY
Qty: 30 TABLET | Refills: 3 | Status: SHIPPED | OUTPATIENT
Start: 2021-02-09 | End: 2021-12-16

## 2021-02-09 RX ADMIN — TRIAMCINOLONE ACETONIDE 80 MG: 40 INJECTION, SUSPENSION INTRA-ARTICULAR; INTRAMUSCULAR at 09:20

## 2021-02-09 RX ADMIN — LIDOCAINE HYDROCHLORIDE 8 ML: 10 INJECTION, SOLUTION EPIDURAL; INFILTRATION; INTRACAUDAL; PERINEURAL at 09:20

## 2021-02-09 NOTE — PROGRESS NOTES
Subjective:     Patient ID: Juliet Fowler is a 51 y.o. female.    Chief Complaint:  Follow-up DJD left knee  Viscosupplementation injection last received 8/5/2020  History of Present Illness  Juliet Fowler returns to clinic for follow-up of her left knee.  Received viscosupplementation injection 8/5/2020 with significant symptom relief and for the last 3 to 4 weeks.  Pain has returned present at the medial aspect as well as the anterior aspect.  Denies giving way.  Extremely pleased with symptom relief with the previous viscosupplementation injection.  Rates discomfort 7 out of a 10 aching in nature.  Pain noted with ascending and descending stairs.  Denies presence of pain radiating to the groin or to the lateral aspect of the hip.  Denies other concerns present time.     Social History     Occupational History   • Not on file   Tobacco Use   • Smoking status: Never Smoker   • Smokeless tobacco: Never Used   Substance and Sexual Activity   • Alcohol use: Never     Frequency: Never   • Drug use: Never   • Sexual activity: Defer      Past Medical History:   Diagnosis Date   • Asthma    • Chondromalacia of knee, left    • COVID-19 11/12/2020   • Heartburn    • Hypercholesterolemia 1/25/2018   • Hypertension    • Obesity (BMI 30-39.9) 7/18/2019     Past Surgical History:   Procedure Laterality Date   • CHOLECYSTECTOMY         Family History   Problem Relation Age of Onset   • Heart disease Maternal Grandmother    • Hypertension Maternal Grandmother    • Lung disease Maternal Grandmother    • Kidney disease Maternal Grandmother    • Prostate cancer Maternal Grandfather    • Heart disease Paternal Grandfather    • Hypertension Father    • Breast cancer Neg Hx          Review of Systems   Constitutional: Negative for chills, diaphoresis, fever and unexpected weight change.   HENT: Negative for hearing loss, nosebleeds, sore throat and tinnitus.    Eyes: Negative for pain and visual disturbance.   Respiratory: Negative  "for cough, shortness of breath and wheezing.    Cardiovascular: Negative for chest pain and palpitations.   Gastrointestinal: Negative for abdominal pain, diarrhea, nausea and vomiting.   Endocrine: Negative for cold intolerance, heat intolerance and polydipsia.   Genitourinary: Negative for difficulty urinating, dysuria and hematuria.   Musculoskeletal: Positive for arthralgias and myalgias. Negative for joint swelling.   Skin: Negative for rash and wound.   Allergic/Immunologic: Negative for environmental allergies.   Neurological: Negative for dizziness, syncope and numbness.   Hematological: Does not bruise/bleed easily.   Psychiatric/Behavioral: Negative for dysphoric mood and sleep disturbance. The patient is not nervous/anxious.            Objective:  Physical Exam  General: No acute distress.  Eyes: conjunctiva clear; pupils equally round and reactive  ENT: external ears and nose atraumatic; oropharynx clear  CV: no peripheral edema  Resp: normal respiratory effort  Skin: no rashes or wounds; normal turgor  Psych: mood and affect appropriate; recent and remote memory intact    Vitals:    02/09/21 0906   Weight: 98.9 kg (218 lb)   Height: 160 cm (63\")         02/09/21  0906   Weight: 98.9 kg (218 lb)     Body mass index is 38.62 kg/m².      Ortho Exam     Left Knee Exam      Tenderness   The patient is experiencing tenderness in the patella and medial joint line, posterior joint line pain      Range of Motion   Extension: 0   Flexion: 130      Tests   Yue:  Medial - negative Lateral - negative  Varus: negative Valgus: negative  Lachman:  Anterior - 1+    Posterior - negative  Drawer:  Anterior - negative     Posterior - negative  Patellar apprehension: positive     Other   Erythema: absent  Sensation: normal  Pulse: present  Swelling: moderate     Comments:    Positive crepitus throughout arc of motion  Positive active patellar compression test     Assessment:        1. Primary osteoarthritis of left " knee           Plan:  1.  Long discussion with patient regarding treatment options.  At this time does wish to proceed with corticosteroid injection left knee.  She wishes to proceed with viscosupplementation submission given the significant symptom relief she received previously.  See her back in clinic after medication authorized to start series.  She verbalized understanding of all information agrees with plan of care.  Denies other concerns present time.  Large Joint Arthrocentesis: L knee  Date/Time: 2/9/2021 9:20 AM  Consent given by: patient  Site marked: site marked  Timeout: Immediately prior to procedure a time out was called to verify the correct patient, procedure, equipment, support staff and site/side marked as required   Supporting Documentation  Indications: pain   Procedure Details  Location: knee - L knee  Preparation: Patient was prepped and draped in the usual sterile fashion  Needle size: 22 G  Approach: superior (lateral)  Medications administered: 8 mL lidocaine PF 1% 1 %; 80 mg triamcinolone acetonide 40 MG/ML  Patient tolerance: patient tolerated the procedure well with no immediate complications          Orders:  Orders Placed This Encounter   Procedures   • Visco Treatment       Medications:  No orders of the defined types were placed in this encounter.      Followup:  No follow-ups on file.    Diagnoses and all orders for this visit:    1. Primary osteoarthritis of left knee (Primary)  -     Visco Treatment; Future      Dictated utilizing Dragon dictation

## 2021-03-31 ENCOUNTER — CLINICAL SUPPORT (OUTPATIENT)
Dept: ORTHOPEDIC SURGERY | Facility: CLINIC | Age: 51
End: 2021-03-31

## 2021-03-31 VITALS — WEIGHT: 218 LBS | BODY MASS INDEX: 38.62 KG/M2 | HEIGHT: 63 IN

## 2021-03-31 DIAGNOSIS — M17.12 PRIMARY OSTEOARTHRITIS OF LEFT KNEE: Primary | ICD-10-CM

## 2021-03-31 PROCEDURE — 20610 DRAIN/INJ JOINT/BURSA W/O US: CPT | Performed by: NURSE PRACTITIONER

## 2021-03-31 NOTE — PROGRESS NOTES
Large Joint Arthrocentesis: L knee  Date/Time: 3/31/2021 9:24 AM  Consent given by: patient  Site marked: site marked  Timeout: Immediately prior to procedure a time out was called to verify the correct patient, procedure, equipment, support staff and site/side marked as required   Supporting Documentation  Indications: pain   Procedure Details  Location: knee - L knee  Preparation: Patient was prepped and draped in the usual sterile fashion  Needle size: 20 G  Approach: superior  Medications administered: 60 mg Sodium Hyaluronate 60 MG/3ML  Patient tolerance: patient tolerated the procedure well with no immediate complications        Patient presents to clinic today for left knee viscosupplement injections.  This is the single injection of the series.  I explained details of injections as well as risks, benefits and alternatives with the patient today, had all questions answered, wished to proceed with injections.  I will see patient back in 8 weeks for re-evaluation. Patient was instructed to watch for signs or symptoms of infection including redness, swelling, warmth to the touch, or significant increased pain and to contact our office immediately if any of these issues were noted.

## 2021-05-28 ENCOUNTER — OFFICE VISIT (OUTPATIENT)
Dept: ORTHOPEDIC SURGERY | Facility: CLINIC | Age: 51
End: 2021-05-28

## 2021-05-28 VITALS — WEIGHT: 218 LBS | BODY MASS INDEX: 38.62 KG/M2 | HEIGHT: 63 IN

## 2021-05-28 DIAGNOSIS — M17.12 PRIMARY OSTEOARTHRITIS OF LEFT KNEE: Primary | ICD-10-CM

## 2021-05-28 PROCEDURE — 20610 DRAIN/INJ JOINT/BURSA W/O US: CPT | Performed by: NURSE PRACTITIONER

## 2021-05-28 PROCEDURE — 99213 OFFICE O/P EST LOW 20 MIN: CPT | Performed by: NURSE PRACTITIONER

## 2021-05-28 RX ORDER — TRIAMCINOLONE ACETONIDE 40 MG/ML
80 INJECTION, SUSPENSION INTRA-ARTICULAR; INTRAMUSCULAR
Status: COMPLETED | OUTPATIENT
Start: 2021-05-28 | End: 2021-05-28

## 2021-05-28 RX ORDER — LIDOCAINE HYDROCHLORIDE 20 MG/ML
8 INJECTION, SOLUTION EPIDURAL; INFILTRATION; INTRACAUDAL; PERINEURAL
Status: COMPLETED | OUTPATIENT
Start: 2021-05-28 | End: 2021-05-28

## 2021-05-28 RX ADMIN — TRIAMCINOLONE ACETONIDE 80 MG: 40 INJECTION, SUSPENSION INTRA-ARTICULAR; INTRAMUSCULAR at 09:30

## 2021-05-28 RX ADMIN — LIDOCAINE HYDROCHLORIDE 8 ML: 20 INJECTION, SOLUTION EPIDURAL; INFILTRATION; INTRACAUDAL; PERINEURAL at 09:30

## 2021-05-28 NOTE — PROGRESS NOTES
Subjective:     Patient ID: Juliet Fowler is a 51 y.o. female.    Chief Complaint:  Follow-up DJD left knee  History of Present Illness  Juliet Fowler returns to clinic with worsening of pain at the posterior joint line of the knee.  Increased pain noted at night when attempting to sleep with activities involving deep flexion pain radiating down the posterior aspect of the lower extremity down into the foot.  She is experiencing stiffness and just pain present.  She is able to walk and ambulate throughout the day completing activities of daily living without any discomfort but is experiencing pain at night.  Receive viscosupplementation injection single series on 3/31/2021 which is help with pain again during the day.  Denies that the knee is locking, catching or giving way.  Denies other concerns present this time.    Social History     Occupational History   • Not on file   Tobacco Use   • Smoking status: Never Smoker   • Smokeless tobacco: Never Used   Vaping Use   • Vaping Use: Never used   Substance and Sexual Activity   • Alcohol use: Never   • Drug use: Never   • Sexual activity: Defer      Past Medical History:   Diagnosis Date   • Asthma    • Chondromalacia of knee, left    • COVID-19 11/12/2020   • Heartburn    • Hypercholesterolemia 1/25/2018   • Hypertension    • Obesity (BMI 30-39.9) 7/18/2019     Past Surgical History:   Procedure Laterality Date   • CHOLECYSTECTOMY         Family History   Problem Relation Age of Onset   • Heart disease Maternal Grandmother    • Hypertension Maternal Grandmother    • Lung disease Maternal Grandmother    • Kidney disease Maternal Grandmother    • Prostate cancer Maternal Grandfather    • Heart disease Paternal Grandfather    • Hypertension Father    • Breast cancer Neg Hx          Objective:  Physical Exam    Eyes: conjunctiva clear; pupils equally round and reactive  ENT: external ears and nose atraumatic; oropharynx clear  CV: no peripheral edema  Resp: normal  "respiratory effort  Skin: no rashes or wounds; normal turgor  Psych: mood and affect appropriate; recent and remote memory intact    Vitals:    05/28/21 0916   Weight: 98.9 kg (218 lb)   Height: 160 cm (63\")         05/28/21  0916   Weight: 98.9 kg (218 lb)     Body mass index is 38.62 kg/m².      Ortho Exam     Left Knee Exam      Tenderness   The patient is experiencing tenderness posterior joint line.     Range of Motion   Extension: 0   Flexion: 130      Tests   Yue:  Medial - negative Lateral - negative  Varus: negative Valgus: negative  Lachman:  Anterior - 1+    Posterior - negative  Drawer:  Anterior - negative     Posterior - negative  Patellar apprehension:  Negative     Other   Erythema: absent  Sensation: normal  Pulse: present  Swelling:  Mild     Comments:    Positive crepitus throughout arc of motion  Negative active patellar compression test    Assessment:        1. Primary osteoarthritis of left knee           Plan:  1.  Discussed plan of care with patient.  Discussed treatment options including MRI versus corticosteroid injection which proceed corticosteroid injection to reduce the swelling of the knee.  If not improving in proceed with MRI to for further evaluation but plan to see her back in clinic in 6 weeks as needed.  All questions answered.  Large Joint Arthrocentesis: L knee  Date/Time: 5/28/2021 9:30 AM  Consent given by: patient  Site marked: site marked  Timeout: Immediately prior to procedure a time out was called to verify the correct patient, procedure, equipment, support staff and site/side marked as required   Supporting Documentation  Indications: pain   Procedure Details  Location: knee - L knee  Preparation: Patient was prepped and draped in the usual sterile fashion  Needle size: 22 G  Approach: superior  Medications administered: 8 mL lidocaine PF 2% 2 %; 80 mg triamcinolone acetonide 40 MG/ML  Patient tolerance: patient tolerated the procedure well with no immediate " complications        Orders:  Orders Placed This Encounter   Procedures   • Large Joint Arthrocentesis: L knee       Medications:  No orders of the defined types were placed in this encounter.      Followup:  No follow-ups on file.    Diagnoses and all orders for this visit:    1. Primary osteoarthritis of left knee (Primary)    Other orders  -     Large Joint Arthrocentesis: L knee        Dictated utilizing Dragon dictation

## 2021-06-09 DIAGNOSIS — Z00.00 ANNUAL PHYSICAL EXAM: ICD-10-CM

## 2021-06-09 DIAGNOSIS — I10 ESSENTIAL HYPERTENSION: ICD-10-CM

## 2021-06-09 DIAGNOSIS — R53.82 CHRONIC FATIGUE: ICD-10-CM

## 2021-06-09 DIAGNOSIS — E78.00 HYPERCHOLESTEROLEMIA: Primary | ICD-10-CM

## 2021-06-11 LAB
ALBUMIN SERPL-MCNC: 4.4 G/DL (ref 3.5–5.2)
ALBUMIN/GLOB SERPL: 1.7 G/DL
ALP SERPL-CCNC: 87 U/L (ref 39–117)
ALT SERPL-CCNC: 25 U/L (ref 1–33)
AST SERPL-CCNC: 16 U/L (ref 1–32)
BASOPHILS # BLD AUTO: 0.04 10*3/MM3 (ref 0–0.2)
BASOPHILS NFR BLD AUTO: 0.3 % (ref 0–1.5)
BILIRUB SERPL-MCNC: 0.6 MG/DL (ref 0–1.2)
BUN SERPL-MCNC: 17 MG/DL (ref 6–20)
BUN/CREAT SERPL: 23.3 (ref 7–25)
CALCIUM SERPL-MCNC: 9.4 MG/DL (ref 8.6–10.5)
CHLORIDE SERPL-SCNC: 102 MMOL/L (ref 98–107)
CHOLEST SERPL-MCNC: 156 MG/DL (ref 0–200)
CO2 SERPL-SCNC: 27.2 MMOL/L (ref 22–29)
CREAT SERPL-MCNC: 0.73 MG/DL (ref 0.57–1)
EOSINOPHIL # BLD AUTO: 0.08 10*3/MM3 (ref 0–0.4)
EOSINOPHIL NFR BLD AUTO: 0.6 % (ref 0.3–6.2)
ERYTHROCYTE [DISTWIDTH] IN BLOOD BY AUTOMATED COUNT: 12.4 % (ref 12.3–15.4)
GLOBULIN SER CALC-MCNC: 2.6 GM/DL
GLUCOSE SERPL-MCNC: 93 MG/DL (ref 65–99)
HBA1C MFR BLD: 5.7 % (ref 4.8–5.6)
HCT VFR BLD AUTO: 42.8 % (ref 34–46.6)
HDLC SERPL-MCNC: 46 MG/DL (ref 40–60)
HGB BLD-MCNC: 14.1 G/DL (ref 12–15.9)
IMM GRANULOCYTES # BLD AUTO: 0.09 10*3/MM3 (ref 0–0.05)
IMM GRANULOCYTES NFR BLD AUTO: 0.7 % (ref 0–0.5)
LDLC SERPL CALC-MCNC: 96 MG/DL (ref 0–100)
LDLC/HDLC SERPL: 2.08 {RATIO}
LYMPHOCYTES # BLD AUTO: 1.5 10*3/MM3 (ref 0.7–3.1)
LYMPHOCYTES NFR BLD AUTO: 12 % (ref 19.6–45.3)
MCH RBC QN AUTO: 32.8 PG (ref 26.6–33)
MCHC RBC AUTO-ENTMCNC: 32.9 G/DL (ref 31.5–35.7)
MCV RBC AUTO: 99.5 FL (ref 79–97)
MONOCYTES # BLD AUTO: 0.93 10*3/MM3 (ref 0.1–0.9)
MONOCYTES NFR BLD AUTO: 7.4 % (ref 5–12)
NEUTROPHILS # BLD AUTO: 9.89 10*3/MM3 (ref 1.7–7)
NEUTROPHILS NFR BLD AUTO: 79 % (ref 42.7–76)
NRBC BLD AUTO-RTO: 0 /100 WBC (ref 0–0.2)
PLATELET # BLD AUTO: 380 10*3/MM3 (ref 140–450)
POTASSIUM SERPL-SCNC: 4.4 MMOL/L (ref 3.5–5.2)
PROT SERPL-MCNC: 7 G/DL (ref 6–8.5)
RBC # BLD AUTO: 4.3 10*6/MM3 (ref 3.77–5.28)
SODIUM SERPL-SCNC: 139 MMOL/L (ref 136–145)
TRIGL SERPL-MCNC: 72 MG/DL (ref 0–150)
VLDLC SERPL CALC-MCNC: 14 MG/DL (ref 5–40)
WBC # BLD AUTO: 12.53 10*3/MM3 (ref 3.4–10.8)

## 2021-06-17 ENCOUNTER — OFFICE VISIT (OUTPATIENT)
Dept: FAMILY MEDICINE CLINIC | Facility: CLINIC | Age: 51
End: 2021-06-17

## 2021-06-17 VITALS
TEMPERATURE: 97.7 F | BODY MASS INDEX: 40.75 KG/M2 | DIASTOLIC BLOOD PRESSURE: 80 MMHG | OXYGEN SATURATION: 95 % | SYSTOLIC BLOOD PRESSURE: 120 MMHG | HEART RATE: 78 BPM | WEIGHT: 230 LBS | HEIGHT: 63 IN

## 2021-06-17 DIAGNOSIS — Z00.00 ENCOUNTER FOR ANNUAL PHYSICAL EXAM: Primary | ICD-10-CM

## 2021-06-17 DIAGNOSIS — E78.00 HYPERCHOLESTEROLEMIA: ICD-10-CM

## 2021-06-17 DIAGNOSIS — E66.01 MORBID OBESITY WITH BMI OF 40.0-44.9, ADULT (HCC): ICD-10-CM

## 2021-06-17 DIAGNOSIS — I10 ESSENTIAL HYPERTENSION: ICD-10-CM

## 2021-06-17 PROCEDURE — 99396 PREV VISIT EST AGE 40-64: CPT | Performed by: PHYSICIAN ASSISTANT

## 2021-06-17 NOTE — PROGRESS NOTES
"Chief Complaint  Annual Exam    Subjective     {Problem List  Visit Diagnosis   Encounters  Notes  Medications  Labs  Result Review Imaging  Media :23}     Juliet Fowler presents to [facility]  History of Present Illness  Scarlet is a 51-year-old female who presents for annual physical examination with hypertension management.  She has gained 12 pounds since 2/9/2021.  She has started to walk 4 times weekly.  She is trying to make healthier eating choices by decreasing portion sizes and eating healthier.  Bowel movements have been normal without dark black tarry stools.  Sleep has been normal.  Has seen gynecologist at [facility] in January 2021.  Denied any fevers, chills, upper respiratory symptoms, chest pain, shortness of air, dizziness, vision changes, abdominal pain, GI upset, nausea, vomiting, diarrhea, urinary symptoms or swelling of ankles.     Objective   Vital Signs:   /80   Pulse 78   Temp 97.7 °F (36.5 °C)   Ht 160 cm (63\")   Wt 104 kg (230 lb)   SpO2 95%   BMI 40.74 kg/m²     Physical Exam  Vitals and nursing note reviewed.   Constitutional:       Appearance: Normal appearance. She is well-developed and well-groomed. She is morbidly obese.      Interventions: Face mask in place.   HENT:      Head: Normocephalic and atraumatic.      Jaw: There is normal jaw occlusion.      Right Ear: Hearing, tympanic membrane, ear canal and external ear normal.      Left Ear: Hearing, tympanic membrane, ear canal and external ear normal.      Nose: Nose normal.      Mouth/Throat:      Lips: Pink.      Mouth: Mucous membranes are moist.      Dentition: Normal dentition.      Tongue: No lesions.      Pharynx: Oropharynx is clear. Uvula midline.      Tonsils: No tonsillar exudate.   Eyes:      General: Lids are normal.      Conjunctiva/sclera: Conjunctivae normal.      Pupils: Pupils are equal, round, and reactive to light.   Neck:      Thyroid: No thyroid mass, " thyromegaly or thyroid tenderness.      Vascular: No carotid bruit.      Trachea: Trachea and phonation normal. No tracheal tenderness.   Cardiovascular:      Rate and Rhythm: Normal rate and regular rhythm.      Pulses: Normal pulses.      Heart sounds: Normal heart sounds, S1 normal and S2 normal. No murmur heard.     Pulmonary:      Effort: Pulmonary effort is normal.      Breath sounds: Normal breath sounds and air entry.   Abdominal:      General: Bowel sounds are normal.      Palpations: Abdomen is soft.      Tenderness: There is no abdominal tenderness.   Musculoskeletal:      Cervical back: Neck supple.      Right lower leg: No edema.      Left lower leg: No edema.   Lymphadenopathy:      Cervical: No cervical adenopathy.      Right cervical: No superficial, deep or posterior cervical adenopathy.     Left cervical: No superficial, deep or posterior cervical adenopathy.   Skin:     General: Skin is warm and dry.      Capillary Refill: Capillary refill takes less than 2 seconds.   Neurological:      Mental Status: She is alert and oriented to person, place, and time.      Deep Tendon Reflexes:      Reflex Scores:       Patellar reflexes are 2+ on the right side and 2+ on the left side.  Psychiatric:         Attention and Perception: Attention and perception normal.         Mood and Affect: Mood and affect normal.         Speech: Speech normal.         Behavior: Behavior is cooperative.         Thought Content: Thought content normal.         Cognition and Memory: Cognition and memory normal.         Judgment: Judgment normal.     I was wearing a surgical mask and face shield during the entire office visit/encounter.    Patient's (Body mass index is 40.74 kg/m².) indicates that they are morbidly obese (BMI > 40 or > 35 with obesity - related health condition) with obesity-related health conditions that include hypertension and dyslipidemias . Obesity is worsening. BMI is is above average; BMI management plan is  completed. We discussed low calorie, low carb based diet program, portion control, increasing exercise, joining a fitness center or start home based exercise program and Weight Watchers or other Commercial based weight reduction program.      Result Review :     CMP    CMP 12/2/20 6/10/21   Glucose 105 (A) 93   BUN 7 17   Creatinine 0.94 0.73   eGFR Non  Am 63 84   eGFR African Am 77 102   Sodium 135 (A) 139   Potassium 3.9 4.4   Chloride 96 (A) 102   Calcium 9.4 9.4   Total Protein 7.3 7.0   Albumin 4.10 4.40   Globulin 3.2 2.6   Total Bilirubin 0.6 0.6   Alkaline Phosphatase 80 87   AST (SGOT) 25 16   ALT (SGPT) 34 (A) 25   (A) Abnormal value       Comments are available for some flowsheets but are not being displayed.           CBC w/diff    CBC w/Diff 12/2/20 6/10/21   WBC 10.12 12.53 (A)   RBC 4.49 4.30   Hemoglobin 14.1 14.1   Hematocrit 43.1 42.8   MCV 96.0 99.5 (A)   MCH 31.4 32.8   MCHC 32.7 32.9   RDW 12.4 12.4   Platelets 475 (A) 380   Neutrophil Rel % 75.9 79.0 (A)   Lymphocyte Rel % 13.9 (A) 12.0 (A)   Monocyte Rel % 8.4 7.4   Eosinophil Rel % 0.5 0.6   Basophil Rel % 0.6 0.3   (A) Abnormal value            Lipid Panel    Lipid Panel 12/2/20 6/10/21   Total Cholesterol 203 (A) 156   Triglycerides 118 72   HDL Cholesterol 48 46   VLDL Cholesterol 21 14   LDL Cholesterol  134 (A) 96   LDL/HDL Ratio  2.08   (A) Abnormal value       Comments are available for some flowsheets but are not being displayed.               Most Recent A1C    HGBA1C Most Recent 6/10/21   Hemoglobin A1C 5.70 (A)   (A) Abnormal value       Comments are available for some flowsheets but are not being displayed.                     Assessment and Plan    Diagnoses and all orders for this visit:    1. Encounter for annual physical exam (Primary)    2. Hypercholesterolemia    3. Essential hypertension    4. Morbid obesity with BMI of 40.0-44.9, adult (CMS/[facility])    1.  Annual physical examination with hypercholesteremia: Have  reviewed above blood work with her at office visit today.  Stressed the importance of eating healthy by decreasing sugar and carbohydrates in diet.  Will return to office in 6 months for reevaluation.  2.  Chronic and stable hypertension: I have rechecked blood pressure at office visit today and got 120/78 in left arm.  Doing well with her Diovan hydrochlorothiazide medication.  No refills are needed.  Will return to office in 6 months.  3.  Chronic and stable morbid obesity: I have stressed the importance of losing weight by dieting and exercising.  Have given her the Mediterranean diet to follow.  I have also discussed weight watchers.  Will reevaluate her weight in 6 months.  Patient Counseling:  --Nutrition: Stressed importance of moderation in sodium/caffeine intake, saturated fat and cholesterol.  Discussed caloric balance, sufficient intake of fresh fruits, vegetables, fiber,   calcium, iron.  --Discussed the new recommendation against daily use of baby aspirin for primary prevention in low risk patients.  --Exercise: Stressed the importance of regular exercise by incorporating into daily routine.    --Substance Abuse: Discussed cessation/primary prevention of tobacco, alcohol, or other drug use; driving or other dangerous activities under the influence.    --Dental health: Discussed importance of regular tooth brushing, flossing, and dental visits.  -- Suggested having eyes and vision checked if needed or past due.  --Immunizations reviewed.  --Discussed benefits of screening colonoscopy.        Follow Up   Return in about 6 months (around 12/17/2021).  Patient was given instructions and counseling regarding her condition or for health maintenance advice. Please see specific information pulled into the AVS if appropriate.     GISELA Browning [facility]  [address] [address]  Dept: [phone]  Dept Fax:  [phone]  Loc: [phone]  Loc Fax: [phone]

## 2021-10-14 ENCOUNTER — OFFICE VISIT (OUTPATIENT)
Dept: FAMILY MEDICINE CLINIC | Facility: CLINIC | Age: 51
End: 2021-10-14

## 2021-10-14 VITALS
SYSTOLIC BLOOD PRESSURE: 140 MMHG | OXYGEN SATURATION: 96 % | RESPIRATION RATE: 13 BRPM | HEART RATE: 108 BPM | WEIGHT: 240 LBS | BODY MASS INDEX: 42.52 KG/M2 | HEIGHT: 63 IN | TEMPERATURE: 98.4 F | DIASTOLIC BLOOD PRESSURE: 80 MMHG

## 2021-10-14 DIAGNOSIS — N63.10 BREAST MASS, RIGHT: Primary | ICD-10-CM

## 2021-10-14 DIAGNOSIS — Z23 NEED FOR INFLUENZA VACCINATION: ICD-10-CM

## 2021-10-14 PROCEDURE — 90471 IMMUNIZATION ADMIN: CPT | Performed by: PHYSICIAN ASSISTANT

## 2021-10-14 PROCEDURE — 99213 OFFICE O/P EST LOW 20 MIN: CPT | Performed by: PHYSICIAN ASSISTANT

## 2021-10-14 PROCEDURE — 90686 IIV4 VACC NO PRSV 0.5 ML IM: CPT | Performed by: PHYSICIAN ASSISTANT

## 2021-10-14 NOTE — PROGRESS NOTES
"Chief Complaint  Breast Mass (right)    Subjective          Juliet Fowler presents to [facility]  History of Present Illness  Scarlet is a 51-year-old female who presents with right breast mass.  States she noticed a breast lump about 5 days ago.  States it is hard and mobile.  Denied any breast discharge or change in skin of breast.  Had mammogram in February 2021.  Caffeine intake is very little.  She would like the flu shot.  Denied any fevers, chills or upper respiratory symptoms.     Objective   Vital Signs:   /80 (BP Location: Left arm, Patient Position: Sitting, Cuff Size: Adult)   Pulse 108   Temp 98.4 °F (36.9 °C)   Resp 13   Ht 160 cm (63\")   Wt 109 kg (240 lb)   SpO2 96%   BMI 42.51 kg/m²     Physical Exam  Vitals and nursing note reviewed.   Constitutional:       Appearance: Normal appearance. She is well-developed and well-groomed. She is morbidly obese.      Interventions: Face mask in place.   HENT:      Head: Normocephalic and atraumatic.   Neck:      Trachea: Trachea and phonation normal. No tracheal tenderness.   Cardiovascular:      Rate and Rhythm: Normal rate and regular rhythm.      Pulses: Normal pulses.      Heart sounds: Normal heart sounds, S1 normal and S2 normal. No murmur heard.      Pulmonary:      Effort: Pulmonary effort is normal.      Breath sounds: Normal breath sounds and air entry.   Chest:   Breasts:      Right: Mass present. No swelling, bleeding, inverted nipple, nipple discharge, skin change, tenderness or axillary adenopathy.      Left: Normal. No axillary adenopathy.         Abdominal:      General: Bowel sounds are normal.      Palpations: Abdomen is soft. There is no hepatomegaly.      Tenderness: There is no abdominal tenderness.   Musculoskeletal:      Cervical back: Neck supple.      Right lower leg: No edema.      Left lower leg: No edema.   Lymphadenopathy:      Upper Body:      Right upper body: No axillary adenopathy.      " Left upper body: No axillary adenopathy.   Skin:     General: Skin is warm and dry.      Capillary Refill: Capillary refill takes less than 2 seconds.      Findings: No rash.   Neurological:      Mental Status: She is alert and oriented to person, place, and time.   Psychiatric:         Attention and Perception: Attention and perception normal.         Mood and Affect: Mood and affect normal.         Speech: Speech normal.         Behavior: Behavior normal. Behavior is cooperative.         Thought Content: Thought content normal.         Cognition and Memory: Cognition and memory normal.         Judgment: Judgment normal.     I was wearing a surgical mask and face shield during the entire office visit/encounter.     Result Review :                 Assessment and Plan {CC Problem List  Visit Diagnosis  ROS  Review (Popup)  Health Maintenance  Quality  BestPractice  Medications  SmartSets  SnapShot Encounters  Media :23}   Diagnoses and all orders for this visit:    1. Breast mass, right (Primary)  -     US Breast Right Limited; Future  -     Mammo Diagnostic Digital Tomosynthesis Right With CAD; Future    2. Need for influenza vaccination  -     FluLaval/Fluarix/Fluzone >6 Months    1.  New right breast mass: I will proceed with right breast ultrasound and diagnostic mammogram at [facility].  She will be notified of test results when completed.  Had normal mammogram in February 2021.  2.  Need for influenza vaccination: She has given written consent for flu shot.  She will receive this today.      Follow Up   No follow-ups on file.  Patient was given instructions and counseling regarding her condition or for health maintenance advice. Please see specific information pulled into the AVS if appropriate.     GISELA Browning [facility]  [address] [place] [id number]  Dept: [phone]  Dept Fax:  [phone]  Loc: [phone]  Loc Fax: [phone]        Answers for HPI/ROS submitted by the patient on 10/14/2021  Please describe your symptoms.: Found lump in right breast  Have you had these symptoms before?: No  How long have you been having these symptoms?: 1-4 days  What is the primary reason for your visit?: Other

## 2021-11-12 ENCOUNTER — HOSPITAL ENCOUNTER (OUTPATIENT)
Dept: ULTRASOUND IMAGING | Facility: HOSPITAL | Age: 51
Discharge: HOME | End: 2021-11-12

## 2021-11-12 ENCOUNTER — HOSPITAL ENCOUNTER (OUTPATIENT)
Dept: MAMMOGRAPHY | Facility: HOSPITAL | Age: 51
Discharge: HOME | End: 2021-11-12

## 2021-11-12 DIAGNOSIS — N63.10 BREAST MASS, RIGHT: ICD-10-CM

## 2021-11-12 DIAGNOSIS — N63.10 BREAST MASS, RIGHT: Primary | ICD-10-CM

## 2021-11-12 PROCEDURE — G0279 TOMOSYNTHESIS, MAMMO: HCPCS

## 2021-11-12 PROCEDURE — 77065 DX MAMMO INCL CAD UNI: CPT

## 2021-11-12 PROCEDURE — 76642 ULTRASOUND BREAST LIMITED: CPT

## 2021-11-15 DIAGNOSIS — N63.10 BREAST MASS, RIGHT: Primary | ICD-10-CM

## 2021-11-17 ENCOUNTER — TELEPHONE (OUTPATIENT)
Dept: SURGERY | Facility: CLINIC | Age: 51
End: 2021-11-17

## 2021-11-17 NOTE — TELEPHONE ENCOUNTER
New patient appointment with Flor Mir NP is scheduled on 3/23/2022 @ 12:00pm.    Called and spoke to patient, patient expressed v/u of appointment time.    Sent patient a reminder letter in the mail.

## 2021-12-09 ENCOUNTER — TELEPHONE (OUTPATIENT)
Dept: ORTHOPEDIC SURGERY | Facility: CLINIC | Age: 51
End: 2021-12-09

## 2021-12-09 NOTE — TELEPHONE ENCOUNTER
I found appt scheduled for 12/17 for follow up foot pain for Dr. Young.  I contacted the patient who stated that she dropped something on her foot.  I informed her that she could go to [facility] and also gave her contact Info from [facility] so she doesn't have to wait.  Patient understood and agreed.

## 2021-12-16 ENCOUNTER — OFFICE VISIT (OUTPATIENT)
Dept: FAMILY MEDICINE CLINIC | Facility: CLINIC | Age: 51
End: 2021-12-16

## 2021-12-16 VITALS
HEART RATE: 91 BPM | RESPIRATION RATE: 15 BRPM | WEIGHT: 235 LBS | TEMPERATURE: 98.4 F | HEIGHT: 63 IN | SYSTOLIC BLOOD PRESSURE: 120 MMHG | BODY MASS INDEX: 41.64 KG/M2 | OXYGEN SATURATION: 99 % | DIASTOLIC BLOOD PRESSURE: 80 MMHG

## 2021-12-16 DIAGNOSIS — I10 ESSENTIAL HYPERTENSION: Primary | ICD-10-CM

## 2021-12-16 DIAGNOSIS — R12 HEARTBURN: ICD-10-CM

## 2021-12-16 PROCEDURE — 99213 OFFICE O/P EST LOW 20 MIN: CPT | Performed by: PHYSICIAN ASSISTANT

## 2021-12-16 RX ORDER — OMEPRAZOLE 40 MG/1
40 CAPSULE, DELAYED RELEASE ORAL DAILY
Qty: 90 CAPSULE | Refills: 2 | Status: SHIPPED | OUTPATIENT
Start: 2021-12-16 | End: 2022-10-14

## 2021-12-16 RX ORDER — VALSARTAN AND HYDROCHLOROTHIAZIDE 80; 12.5 MG/1; MG/1
1 TABLET, FILM COATED ORAL DAILY
Qty: 90 TABLET | Refills: 3 | Status: SHIPPED | OUTPATIENT
Start: 2021-12-16 | End: 2022-10-24

## 2021-12-16 NOTE — PROGRESS NOTES
"Chief Complaint  Hypertension    Subjective          Juliet Fowler presents to [facility]  History of Present Illness  Scarlet is a 51-year-old female who presents for hypertension management.  States she is doing well today.  Has lost 5 pounds since last office visit.  She has been going to the gym at least 4-5 times weekly.  Her diet has been improving.  Sleep has been normal.  Scarlet denied any headache, dizziness, vision changes, chest pain, shortness of air, abdominal pain or swelling of ankles.  Needs refill on her Prilosec and hypertension medication.  Bowel movements have been daily without dark black tarry stools.  Review of Systems   All other systems reviewed and are negative.    Objective   Vital Signs:   /80   Pulse 91   Temp 98.4 °F (36.9 °C)   Resp 15   Ht 160 cm (63\")   Wt 107 kg (235 lb)   SpO2 99%   BMI 41.63 kg/m²     Physical Exam  Vitals and nursing note reviewed.   Constitutional:       Appearance: Normal appearance. She is well-developed and well-groomed. She is morbidly obese.      Interventions: Face mask in place.   HENT:      Head: Normocephalic and atraumatic.   Neck:      Thyroid: No thyroid mass, thyromegaly or thyroid tenderness.      Vascular: No carotid bruit.      Trachea: Trachea and phonation normal. No tracheal tenderness.   Cardiovascular:      Rate and Rhythm: Normal rate and regular rhythm.      Pulses: Normal pulses.      Heart sounds: Normal heart sounds, S1 normal and S2 normal. No murmur heard.      Pulmonary:      Effort: Pulmonary effort is normal.      Breath sounds: Normal breath sounds and air entry.   Abdominal:      General: Bowel sounds are normal.      Palpations: Abdomen is soft. There is no hepatomegaly.      Tenderness: There is no abdominal tenderness. There is no right CVA tenderness, left CVA tenderness, guarding or rebound. Negative signs include Mahmood's sign, Rovsing's sign, McBurney's sign, psoas sign and obturator " sign.   Musculoskeletal:      Cervical back: Neck supple.      Right lower leg: No edema.      Left lower leg: No edema.   Skin:     General: Skin is warm and dry.      Capillary Refill: Capillary refill takes less than 2 seconds.   Neurological:      Mental Status: She is alert and oriented to person, place, and time.   Psychiatric:         Attention and Perception: Attention and perception normal.         Mood and Affect: Mood and affect normal.         Speech: Speech normal.         Behavior: Behavior normal. Behavior is cooperative.         Thought Content: Thought content normal.         Cognition and Memory: Cognition and memory normal.         Judgment: Judgment normal.     I wore a facial mask, face shield, and gloves during this patient encounter.  Patient also wearing a surgical mask.  Hand hygiene performed before and after seeing the patient.     Result Review :                 Assessment and Plan    Diagnoses and all orders for this visit:    1. Essential hypertension (Primary)  -     valsartan-hydrochlorothiazide (DIOVAN-HCT) 80-12.5 MG per tablet; Take 1 tablet by mouth Daily.  Dispense: 90 tablet; Refill: 3    2. Heartburn  -     omeprazole (priLOSEC) 40 MG capsule; Take 1 capsule by mouth Daily.  Dispense: 90 capsule; Refill: 2    1.  Chronic and stable hypertension: I have rechecked Scarlet's blood pressure today and got 120/76 in left arm.  Doing well with her current blood pressure medication.  Have sent a refill to pharmacy.  Will return to office in 6 months for annual physical examination with fasting labs.  2.  Chronic and stable heartburn: Doing well with the Prilosec medication.  Have sent a refill to pharmacy.    Follow Up   Return in about 6 months (around 6/16/2022) for Annual.  Patient was given instructions and counseling regarding her condition or for health maintenance advice. Please see specific information pulled into the AVS if appropriate.     [facility]  [facility]  [address] [address]  Dept: [phone]  Dept Fax: [phone]  Loc: [phone]  Loc Fax: [phone]        Answers for HPI/ROS submitted by the patient on 12/14/2021  Please describe your symptoms.: Just a routine follow up  Have you had these symptoms before?: Yes  How long have you been having these symptoms?: Greater than 2 weeks  What is the primary reason for your visit?: Other

## 2022-01-19 ENCOUNTER — OFFICE VISIT (OUTPATIENT)
Dept: SURGERY | Facility: CLINIC | Age: 52
End: 2022-01-19

## 2022-01-19 VITALS
HEIGHT: 63 IN | SYSTOLIC BLOOD PRESSURE: 143 MMHG | DIASTOLIC BLOOD PRESSURE: 76 MMHG | OXYGEN SATURATION: 98 % | BODY MASS INDEX: 41.64 KG/M2 | WEIGHT: 235 LBS | HEART RATE: 94 BPM

## 2022-01-19 DIAGNOSIS — N63.0 LUMP OR MASS IN BREAST: Primary | ICD-10-CM

## 2022-01-19 DIAGNOSIS — R92.8 ABNORMAL FINDING ON BREAST IMAGING: ICD-10-CM

## 2022-01-19 PROCEDURE — 99214 OFFICE O/P EST MOD 30 MIN: CPT | Performed by: NURSE PRACTITIONER

## 2022-01-19 NOTE — PROGRESS NOTES
[facility]     Referring Provider: Heidi Moe PA-C     Chief complaint: breast mass     HPI: Ms. Juliet Fowler is a 50 yo woman, seen at the request of Heidi Moe PA-C, for abnormal breast imaging, right breast mass.    I personally reviewed her records and summarized her relevant breast history/imaging:    She has no personal history of breast procedures and denies any family history of breast or ovarian cancer.     10/14/21: clinic visit with WILLIAM Mitchell  51-year-old female who presents with right breast mass.  States she noticed a breast lump about 5 days ago.  States it is hard and mobile.  Denied any breast discharge or change in skin of breast.  Breasts:      Right: Mass present. No swelling, bleeding, inverted nipple, nipple discharge, skin change, tenderness or axillary adenopathy.      Left: Normal. No axillary adenopathy.  New right breast mass: I will proceed with right breast ultrasound and diagnostic mammogram at [facility].  She will be notified of test results when completed.  Had normal mammogram in February 2021.    9/25/18:  Screening mammogram with tomosynthesis at [facility]  FINDINGS:  The breasts are almost entirely fatty. No significant change when compared with prior images. No mammographic evidence of malignancy.  Recommend repeat screening mammogram in one year.   IMPRESSION:  Negative screening mammogram.   BI-RADS CATEGORY 1: Negative    10/1/19: Screening mammogram with tomosynthesis at [facility]  FINDINGS:  The breasts are almost entirely fatty. No significant change when compared with prior images. No mammographic evidence of malignancy.  Recommend repeat screening mammogram in one year.   IMPRESSION:  Negative screening mammogram.   BI-RADS CATEGORY 1: Negative    2/2/2021:  Screening mammogram with tomosynthesis at [facility]  FINDINGS:  The breasts are almost entirely fatty. No significant change when compared with prior images. No  mammographic evidence of malignancy.  Recommend repeat screening mammogram in one year.   IMPRESSION:  Negative screening mammogram.   BI-RADS CATEGORY 1: Negative    11/12/2021:  Right diagnostic mammogram with tomosynthesis, right limited breast US at [facility]  HISTORY:  51-year-old female complaining of a new right breast lump one month.  Normal size. Negative screening mammogram in February.   MAMMOGRAM FINDINGS:  Breast parenchyma is predominantly fatty replaced. Marker overlies the area of patient palpable concern localizing to the approximately 2:00 position right breast. Deep to the marker there is some new stranding of the breast parenchyma without evidence of underlying distinct nodule mass or suspicious cluster of microcalcifications. The remainder of the  right breast is negative. Ultrasound was performed for further evaluation of the palpable area of concern.   ULTRASOUND FINDINGS:  The patient was initially scanned independently by the technologist and also rescanned in my presence. Limited physical exam (with patient consent) was performed by me in the department and demonstrates a palpable [place]-sized nodule. Ultrasound of the area of patient palpable concern demonstrates a superficial area of oval-shaped increased echogenicity with ill-defined margins measuring up to about 2.4 cm.  Within this area there are 2 areas of cystic change measuring 6 mm and 3 mm respectively. There is no internal color flow or suspicious mural nodularity within the cystic foci and these are not visible on the patient's mammogram. There is some mild posterior acoustical shadowing associated with the areas of mixed echogenicity. Taken together, imaging features are probably benign and most likely reflect sequela of evolving fat necrosis.   SUMMARY:  Probable area of evolving fat necrosis at the site of patient palpable concern in the 1:00 position right breast. 6 month follow-up imaging with mammography and ultrasound  recommended to reassess stability. FINDINGS and recommendations discussed with the patient.   IMPRESSION:  1. Probably benign evolving fat necrosis in the right breast. 6 month follow-up recommended.   BI-RADS CATEGORY 3: Probably benign finding      Today she presents with continued right breast nodule that has been present since 9/21.  It seems to be slightly smaller than when she first noticed it.  She denies any breast skin changes, or nipple discharge.  She denies any prior history of abnormal mammograms or breast biopsies.   She was by herself in clinic today.       Review of Systems   Cardiovascular: Positive for palpitations.   Genitourinary: Positive for menstrual problem.    All other systems reviewed and are negative.      Medications:    Current Outpatient Medications:   •  cetirizine (zyrTEC) 10 MG tablet, Take 10 mg by mouth Daily., Disp: , Rfl:   •  EPIPEN 2-TRAM 0.3 MG/0.3ML solution auto-injector injection, , Disp: , Rfl:   •  fluticasone (FLONASE) 50 MCG/ACT nasal spray, , Disp: , Rfl:   •  norethindrone (MICRONOR) 0.35 MG tablet, Take 1 tablet by mouth Daily., Disp: 28 tablet, Rfl: 0  •  omeprazole (priLOSEC) 40 MG capsule, Take 1 capsule by mouth Daily., Disp: 90 capsule, Rfl: 2  •  PROAIR  (90 Base) MCG/ACT inhaler, Inhale 1 puff Every 6 (Six) Hours As Needed for Wheezing., Disp: 18 g, Rfl: 6  •  valsartan-hydrochlorothiazide (DIOVAN-HCT) 80-12.5 MG per tablet, Take 1 tablet by mouth Daily., Disp: 90 tablet, Rfl: 3    Allergies:  Allergies   Allergen Reactions   • Dust Mite Extract Itching and Unknown - High Severity   • Bird Grass Unknown - High Severity   • Molds & Smuts Unknown - High Severity   • Lisinopril Cough       Medical history:  Past Medical History:   Diagnosis Date   • Asthma    • Chondromalacia of knee, left    • COVID-19 11/12/2020   • Heartburn    • Hypercholesterolemia 1/25/2018   • Hypertension    • Obesity (BMI 30-39.9) 7/18/2019       Surgical History:  Past  Surgical History:   Procedure Laterality Date   • CHOLECYSTECTOMY         Family History:  Family History   Problem Relation Age of Onset   • Heart disease Maternal Grandmother    • Hypertension Maternal Grandmother    • Lung disease Maternal Grandmother    • Kidney disease Maternal Grandmother    • Melanoma Maternal Grandmother    • Prostate cancer Maternal Grandfather    • Heart disease Paternal Grandfather    • Hypertension Father    • Melanoma Maternal Uncle    • Breast cancer Neg Hx        Social History:   Social History     Socioeconomic History   • Marital status:    Tobacco Use   • Smoking status: Never Smoker   • Smokeless tobacco: Never Used   Vaping Use   • Vaping Use: Never used   Substance and Sexual Activity   • Alcohol use: Never   • Drug use: Never   • Sexual activity: Defer            GYNECOLOGIC HISTORY:   . P: 2. AB: 0.  Last menstrual period: 23  Age at menarche: 13  Age at first childbirth: 20  Lactation/How lon week  Age at menopause: n/a  Total years of oral contraceptive use: 30  Total years of hormone replacement therapy: n/a      Physical Exam  Vitals:    22 0953   BP: 143/76   Pulse: 94   SpO2: 98%     ECOG 0 - Asymptomatic  General: NAD, well appearing  Psych: a&o x 3, normal mood and affect  Eyes: EOMI, no scleral icterus  ENMT: neck supple without masses or thyromegaly, mucus membranes moist  Resp: normal effort, CTAB  CV: RRR, no murmurs, no edema   GI: soft, NT, ND  MSK: normal gait, normal ROM in bilateral shoulders  Lymph nodes:  no cervical, supraclavicular or axillary lymphadenopathy  Breast: asymmetric, right > left, large and pendulous  Right: At the area of concern, 0200, 10 CFN a superficial nodule, 2 cm is noted.  No visible abnormalities on inspection while seated, with arms raised or hands on hips. No skin changes, or nipple abnormalities.  Left:  No visible abnormalities on inspection while seated, with arms raised or hands on hips. No masses,  skin changes, or nipple abnormalities.      Assessment:    1)  51 y.o. F with a new palpable area of concern right breast    2)  Abnormal right breast imaging for which surveillance is recommended (11/21)      Discussion:  1)  We discussed the new palpable nodule she has noticed in her right breast since 9/2021.  She does not recall any trauma to her breast prior to the nodule being discovered.  It has started to decrease in size since first noted.    2)  Imaging and exam findings were reviewed.  I explained the concept of a BI-RADS 3 designation and the process of imaging surveillance. We discussed that a BI-RADS 3 designation describes an imaging finding that is 98-99% likely to represent a benign process. We discussed that the most common management of a BI-RADS 3 finding is initial 6 month imaging surveillance and that the entire period of imaging surveillance can often take 2 years.     Her imaging suggests that findings are c/w evolving fat necrosis.  Fat necrosis and oil cysts of the breast are benign breast conditions that happen when an area of the fatty breast tissue is damaged, usually as a result of injury to the breast.  It can also happen after breast surgery or radiation treatment.  Fat necrosis is more common in women with very large breasts.    Her blood pressure is elevated today, she was advised to monitor at home and see her PCP if not improved.     Plan:    - right breast diagnostic mammogram with tomosynthesis, right limited US in 5/2022 at [facility] followed by exam    I have advised the patient to continue monthly breast self examination and to return to see me in after completion of imaging.  She was also advised to notify us if she develops new breast symptoms.       CLINT Manriquez    I spent 48 minutes caring for Ms Fowler on this date of service. This time includes time spent by me in the following activities: preparing for the visit, reviewing tests, obtaining and/or reviewing  a separately obtained history, performing a medically appropriate examination and/or evaluation , counseling and educating the patient/family/caregiver and documenting information in the medical record.    CC:  GISELA Jay, Heidi MITCHELL PA-C    EMR Dragon/transcription disclaimer:  Dictated using Dragon dictation

## 2022-02-14 ENCOUNTER — TELEPHONE (OUTPATIENT)
Dept: ORTHOPEDIC SURGERY | Facility: CLINIC | Age: 52
End: 2022-02-14

## 2022-02-14 NOTE — TELEPHONE ENCOUNTER
Caller: CARLENE HOLBROOK     Relationship to patient: SELF     Best call back number: [phone]    Type of visit: KNEE INJECTIONS

## 2022-02-22 ENCOUNTER — OFFICE VISIT (OUTPATIENT)
Dept: ORTHOPEDIC SURGERY | Facility: CLINIC | Age: 52
End: 2022-02-22

## 2022-02-22 VITALS — BODY MASS INDEX: 41.64 KG/M2 | HEIGHT: 63 IN | WEIGHT: 235 LBS

## 2022-02-22 DIAGNOSIS — M17.12 PRIMARY OSTEOARTHRITIS OF LEFT KNEE: Primary | ICD-10-CM

## 2022-02-22 PROCEDURE — 99213 OFFICE O/P EST LOW 20 MIN: CPT | Performed by: NURSE PRACTITIONER

## 2022-02-22 PROCEDURE — 73562 X-RAY EXAM OF KNEE 3: CPT | Performed by: NURSE PRACTITIONER

## 2022-02-22 PROCEDURE — 20610 DRAIN/INJ JOINT/BURSA W/O US: CPT | Performed by: NURSE PRACTITIONER

## 2022-02-22 RX ADMIN — TRIAMCINOLONE ACETONIDE 80 MG: 40 INJECTION, SUSPENSION INTRA-ARTICULAR; INTRAMUSCULAR at 08:59

## 2022-02-22 RX ADMIN — LIDOCAINE HYDROCHLORIDE 8 ML: 10 INJECTION, SOLUTION INFILTRATION; PERINEURAL at 08:59

## 2022-03-03 RX ORDER — LIDOCAINE HYDROCHLORIDE 10 MG/ML
8 INJECTION, SOLUTION INFILTRATION; PERINEURAL
Status: COMPLETED | OUTPATIENT
Start: 2022-02-22 | End: 2022-02-22

## 2022-03-03 RX ORDER — TRIAMCINOLONE ACETONIDE 40 MG/ML
80 INJECTION, SUSPENSION INTRA-ARTICULAR; INTRAMUSCULAR
Status: COMPLETED | OUTPATIENT
Start: 2022-02-22 | End: 2022-02-22

## 2022-05-11 ENCOUNTER — HOSPITAL ENCOUNTER (OUTPATIENT)
Dept: ULTRASOUND IMAGING | Facility: HOSPITAL | Age: 52
Discharge: HOME | End: 2022-05-11

## 2022-05-11 ENCOUNTER — HOSPITAL ENCOUNTER (OUTPATIENT)
Dept: MAMMOGRAPHY | Facility: HOSPITAL | Age: 52
Discharge: HOME | End: 2022-05-11

## 2022-05-11 DIAGNOSIS — N63.10 BREAST MASS, RIGHT: ICD-10-CM

## 2022-05-11 PROCEDURE — 76642 ULTRASOUND BREAST LIMITED: CPT

## 2022-05-11 PROCEDURE — 77066 DX MAMMO INCL CAD BI: CPT

## 2022-05-11 PROCEDURE — G0279 TOMOSYNTHESIS, MAMMO: HCPCS

## 2022-05-16 ENCOUNTER — TELEPHONE (OUTPATIENT)
Dept: SURGERY | Facility: CLINIC | Age: 52
End: 2022-05-16

## 2022-05-16 NOTE — TELEPHONE ENCOUNTER
I called patient and let her know that her recent imaging is stable.     Routine follow up recommended.     She expressed v/u of appointment date and time.

## 2022-06-14 DIAGNOSIS — R73.9 HYPERGLYCEMIA: ICD-10-CM

## 2022-06-14 DIAGNOSIS — E78.00 HYPERCHOLESTEROLEMIA: ICD-10-CM

## 2022-06-14 DIAGNOSIS — Z00.00 ENCOUNTER FOR ANNUAL PHYSICAL EXAM: Primary | ICD-10-CM

## 2022-06-14 DIAGNOSIS — R53.82 CHRONIC FATIGUE: ICD-10-CM

## 2022-06-14 DIAGNOSIS — I10 ESSENTIAL HYPERTENSION: ICD-10-CM

## 2022-08-03 ENCOUNTER — TELEPHONE (OUTPATIENT)
Dept: SURGERY | Facility: CLINIC | Age: 52
End: 2022-08-03

## 2022-10-13 DIAGNOSIS — R12 HEARTBURN: ICD-10-CM

## 2022-10-14 RX ORDER — OMEPRAZOLE 40 MG/1
CAPSULE, DELAYED RELEASE ORAL
Qty: 90 CAPSULE | Refills: 2 | Status: SHIPPED | OUTPATIENT
Start: 2022-10-14

## 2022-10-23 PROBLEM — Z00.00 ANNUAL PHYSICAL EXAM: Status: ACTIVE | Noted: 2022-10-23

## 2022-10-24 ENCOUNTER — OFFICE VISIT (OUTPATIENT)
Dept: FAMILY MEDICINE CLINIC | Facility: CLINIC | Age: 52
End: 2022-10-24

## 2022-10-24 VITALS
OXYGEN SATURATION: 98 % | WEIGHT: 248 LBS | SYSTOLIC BLOOD PRESSURE: 134 MMHG | RESPIRATION RATE: 14 BRPM | BODY MASS INDEX: 43.94 KG/M2 | TEMPERATURE: 97.5 F | HEART RATE: 91 BPM | DIASTOLIC BLOOD PRESSURE: 80 MMHG | HEIGHT: 63 IN

## 2022-10-24 DIAGNOSIS — R73.9 HYPERGLYCEMIA: ICD-10-CM

## 2022-10-24 DIAGNOSIS — E78.00 HYPERCHOLESTEROLEMIA: ICD-10-CM

## 2022-10-24 DIAGNOSIS — I10 ESSENTIAL HYPERTENSION: ICD-10-CM

## 2022-10-24 DIAGNOSIS — Z00.00 ANNUAL PHYSICAL EXAM: Primary | ICD-10-CM

## 2022-10-24 DIAGNOSIS — Z23 NEED FOR INFLUENZA VACCINATION: ICD-10-CM

## 2022-10-24 PROBLEM — D25.1 INTRAMURAL LEIOMYOMA OF UTERUS: Status: ACTIVE | Noted: 2022-06-27

## 2022-10-24 PROCEDURE — 90686 IIV4 VACC NO PRSV 0.5 ML IM: CPT | Performed by: PHYSICIAN ASSISTANT

## 2022-10-24 PROCEDURE — 99396 PREV VISIT EST AGE 40-64: CPT | Performed by: PHYSICIAN ASSISTANT

## 2022-10-24 PROCEDURE — 90471 IMMUNIZATION ADMIN: CPT | Performed by: PHYSICIAN ASSISTANT

## 2022-10-24 RX ORDER — VALSARTAN AND HYDROCHLOROTHIAZIDE 80; 12.5 MG/1; MG/1
1 TABLET, FILM COATED ORAL DAILY
Qty: 90 TABLET | Refills: 3 | Status: SHIPPED | OUTPATIENT
Start: 2022-10-24

## 2022-10-24 NOTE — PROGRESS NOTES
"Chief Complaint  Annual Exam    Subjective          Juliet Fowler presents to [facility]  History of Present Illness  Scarlet is a 52-year-old female who presents for annual physical exam with hypertension, hyperglycemia and hypercholesterolemia management.  She has gained 13 pounds since February 2022.  [facility] sees [facility]ological needs.  Bowel movements have been daily without dark black tarry stools.  Diet has been improving.  She has been making healthier choices.  Has not been exercising.  Sleep has been normal.  Has been compliant with medication.  Scarlet denies any fevers, chills, upper respiratory symptoms, chest pain, shortness of air, cough, wheezing, abdominal pain, GI upset, urinary symptoms, or swelling of ankles.  She is fasting today.  Would like flu shot.  Review of Systems   Constitutional: Negative.    HENT: Negative.    Eyes: Negative.    Respiratory: Negative.    Cardiovascular: Negative.    Gastrointestinal: Negative.    Endocrine: Negative.    Genitourinary: Negative.    Musculoskeletal: Negative.    Skin: Negative.    Allergic/Immunologic: Negative.    Neurological: Negative.    Hematological: Negative.    Psychiatric/Behavioral: Negative.    All other systems reviewed and are negative.    Objective   Vital Signs:   /80 (BP Location: Left arm, Patient Position: Sitting, Cuff Size: Large Adult)   Pulse 91   Temp 97.5 °F (36.4 °C)   Ht 160 cm (63\")   Wt 112 kg (248 lb)   SpO2 98%   BMI 43.93 kg/m²     Physical Exam  Vitals and nursing note reviewed.   Constitutional:       Appearance: Normal appearance. She is well-developed and well-groomed. She is morbidly obese.      Interventions: Face mask in place.   HENT:      Head: Normocephalic and atraumatic.      Jaw: There is normal jaw occlusion.      Right Ear: Hearing, tympanic membrane, ear canal and external ear normal.      Left Ear: Hearing, tympanic membrane, ear canal and " external ear normal.      Nose: Nose normal.      Right Sinus: No maxillary sinus tenderness or frontal sinus tenderness.      Left Sinus: No maxillary sinus tenderness or frontal sinus tenderness.      Mouth/Throat:      Lips: Pink.      Mouth: Mucous membranes are moist.      Dentition: Normal dentition.      Tongue: No lesions.      Pharynx: Oropharynx is clear. Uvula midline.      Tonsils: No tonsillar exudate.   Eyes:      General: Lids are normal.      Conjunctiva/sclera: Conjunctivae normal.      Pupils: Pupils are equal, round, and reactive to light.   Neck:      Thyroid: No thyroid mass, thyromegaly or thyroid tenderness.      Vascular: No carotid bruit.      Trachea: Trachea and phonation normal. No tracheal tenderness.   Cardiovascular:      Rate and Rhythm: Normal rate and regular rhythm.      Pulses: Normal pulses.      Heart sounds: Normal heart sounds, S1 normal and S2 normal. No murmur heard.  Pulmonary:      Effort: Pulmonary effort is normal.      Breath sounds: Normal breath sounds and air entry.   Abdominal:      General: Bowel sounds are normal.      Palpations: Abdomen is soft.      Tenderness: There is no abdominal tenderness. There is no right CVA tenderness, left CVA tenderness, guarding or rebound. Negative signs include Mahmood's sign, Rovsing's sign, McBurney's sign, psoas sign and obturator sign.   Musculoskeletal:      Cervical back: Neck supple.      Right lower leg: No edema.      Left lower leg: No edema.   Lymphadenopathy:      Cervical: No cervical adenopathy.      Right cervical: No superficial, deep or posterior cervical adenopathy.     Left cervical: No superficial, deep or posterior cervical adenopathy.   Skin:     General: Skin is warm and dry.      Capillary Refill: Capillary refill takes less than 2 seconds.   Neurological:      Mental Status: She is alert and oriented to person, place, and time.      Deep Tendon Reflexes:      Reflex Scores:       Patellar reflexes are 2+ on  the right side and 2+ on the left side.  Psychiatric:         Attention and Perception: Attention and perception normal.         Mood and Affect: Mood and affect normal.         Speech: Speech normal.         Behavior: Behavior is cooperative.         Thought Content: Thought content normal.         Cognition and Memory: Cognition and memory normal.         Judgment: Judgment normal.     I wore a facial mask during this patient encounter.  Patient also wearing a surgical mask.  Hand hygiene performed before and after seeing the patient.     Result Review :{Labs  Result Review  Imaging  Med Tab  Media :23}                 Assessment and Plan    Diagnoses and all orders for this visit:    1. Annual physical exam (Primary)  -     CBC & Differential  -     Comprehensive Metabolic Panel  -     Lipid Panel With LDL / HDL Ratio  -     TSH    2. Essential hypertension  -     valsartan-hydrochlorothiazide (DIOVAN-HCT) 80-12.5 MG per tablet; Take 1 tablet by mouth Daily.  Dispense: 90 tablet; Refill: 3    3. Hypercholesterolemia  -     CBC & Differential  -     Comprehensive Metabolic Panel  -     Lipid Panel With LDL / HDL Ratio    4. Hyperglycemia  -     Hemoglobin A1c    5. Need for influenza vaccination  -     FluLaval/Fluzone >6 mos (0295-3146)      1.  Annual physical exam with hypercholesteremia/hyperglycemia: Scarlet is fasting and will have a CBC, CMP, lipid profile, A1c and a TSH collected today.  She will be notified of test results when completed.  2.  Chronic stable hypertension: Blood pressure was in therapeutic range.  Have refilled her Diovan HCTZ medication to pharmacy.  Plan to follow-up in 6 months.  3.  Need for influenza vaccination: Scarlet is given written consent to receive flu shot today.  Patient Counseling:  --Nutrition: Stressed importance of moderation in sodium/caffeine intake, saturated fat and cholesterol.  Discussed caloric balance, sufficient intake of fresh fruits, vegetables, fiber,   calcium,  iron.  --Discussed the new recommendation against daily use of baby aspirin for primary prevention in low risk patients.  --Exercise: Stressed the importance of regular exercise by incorporating into daily routine.    --Substance Abuse: Discussed cessation/primary prevention of tobacco, alcohol, or other drug use; driving or other dangerous activities under the influence.    --Dental health: Discussed importance of regular tooth brushing, flossing, and dental visits.  -- Suggested having eyes and vision checked if needed or past due.  --Immunizations reviewed.  --Discussed benefits of screening colonoscopy.        Follow Up   Return in about 6 months (around 4/24/2023), or HTN.  Patient was given instructions and counseling regarding her condition or for health maintenance advice. Please see specific information pulled into the AVS if appropriate.     GISELA Browning [facility]  [address] [place] [id number]  Dept: [phone]  Dept Fax: [phone]  Loc: [phone]  Loc Fax: [phone]

## 2022-10-25 LAB
ALBUMIN SERPL-MCNC: 4 G/DL (ref 3.5–5.2)
ALBUMIN/GLOB SERPL: 1.5 G/DL
ALP SERPL-CCNC: 76 U/L (ref 39–117)
ALT SERPL-CCNC: 17 U/L (ref 1–33)
AST SERPL-CCNC: 17 U/L (ref 1–32)
BASOPHILS # BLD AUTO: 0.04 10*3/MM3 (ref 0–0.2)
BASOPHILS NFR BLD AUTO: 0.4 % (ref 0–1.5)
BILIRUB SERPL-MCNC: 0.3 MG/DL (ref 0–1.2)
BUN SERPL-MCNC: 9 MG/DL (ref 6–20)
BUN/CREAT SERPL: 12 (ref 7–25)
CALCIUM SERPL-MCNC: 9.1 MG/DL (ref 8.6–10.5)
CHLORIDE SERPL-SCNC: 104 MMOL/L (ref 98–107)
CHOLEST SERPL-MCNC: 196 MG/DL (ref 0–200)
CO2 SERPL-SCNC: 28.3 MMOL/L (ref 22–29)
CREAT SERPL-MCNC: 0.75 MG/DL (ref 0.57–1)
EGFRCR SERPLBLD CKD-EPI 2021: 96.5 ML/MIN/1.73
EOSINOPHIL # BLD AUTO: 0.1 10*3/MM3 (ref 0–0.4)
EOSINOPHIL NFR BLD AUTO: 1.1 % (ref 0.3–6.2)
ERYTHROCYTE [DISTWIDTH] IN BLOOD BY AUTOMATED COUNT: 12.4 % (ref 12.3–15.4)
GLOBULIN SER CALC-MCNC: 2.6 GM/DL
GLUCOSE SERPL-MCNC: 122 MG/DL (ref 65–99)
HBA1C MFR BLD: 5.9 % (ref 4.8–5.6)
HCT VFR BLD AUTO: 36 % (ref 34–46.6)
HDLC SERPL-MCNC: 41 MG/DL (ref 40–60)
HGB BLD-MCNC: 12.4 G/DL (ref 12–15.9)
IMM GRANULOCYTES # BLD AUTO: 0.05 10*3/MM3 (ref 0–0.05)
IMM GRANULOCYTES NFR BLD AUTO: 0.5 % (ref 0–0.5)
LDLC SERPL CALC-MCNC: 123 MG/DL (ref 0–100)
LDLC/HDLC SERPL: 2.89 {RATIO}
LYMPHOCYTES # BLD AUTO: 1.1 10*3/MM3 (ref 0.7–3.1)
LYMPHOCYTES NFR BLD AUTO: 11.8 % (ref 19.6–45.3)
MCH RBC QN AUTO: 32.5 PG (ref 26.6–33)
MCHC RBC AUTO-ENTMCNC: 34.4 G/DL (ref 31.5–35.7)
MCV RBC AUTO: 94.5 FL (ref 79–97)
MONOCYTES # BLD AUTO: 0.53 10*3/MM3 (ref 0.1–0.9)
MONOCYTES NFR BLD AUTO: 5.7 % (ref 5–12)
NEUTROPHILS # BLD AUTO: 7.54 10*3/MM3 (ref 1.7–7)
NEUTROPHILS NFR BLD AUTO: 80.5 % (ref 42.7–76)
NRBC BLD AUTO-RTO: 0 /100 WBC (ref 0–0.2)
PLATELET # BLD AUTO: 339 10*3/MM3 (ref 140–450)
POTASSIUM SERPL-SCNC: 4 MMOL/L (ref 3.5–5.2)
PROT SERPL-MCNC: 6.6 G/DL (ref 6–8.5)
RBC # BLD AUTO: 3.81 10*6/MM3 (ref 3.77–5.28)
SODIUM SERPL-SCNC: 141 MMOL/L (ref 136–145)
TRIGL SERPL-MCNC: 182 MG/DL (ref 0–150)
TSH SERPL DL<=0.005 MIU/L-ACNC: 2.23 UIU/ML (ref 0.27–4.2)
VLDLC SERPL CALC-MCNC: 32 MG/DL (ref 5–40)
WBC # BLD AUTO: 9.36 10*3/MM3 (ref 3.4–10.8)

## 2023-04-24 ENCOUNTER — OFFICE VISIT (OUTPATIENT)
Dept: FAMILY MEDICINE CLINIC | Facility: CLINIC | Age: 53
End: 2023-04-24
Payer: COMMERCIAL

## 2023-04-24 VITALS
WEIGHT: 244 LBS | OXYGEN SATURATION: 98 % | BODY MASS INDEX: 43.23 KG/M2 | HEIGHT: 63 IN | SYSTOLIC BLOOD PRESSURE: 120 MMHG | TEMPERATURE: 98 F | HEART RATE: 96 BPM | RESPIRATION RATE: 15 BRPM | DIASTOLIC BLOOD PRESSURE: 80 MMHG

## 2023-04-24 DIAGNOSIS — R73.03 PREDIABETES: Primary | ICD-10-CM

## 2023-04-24 DIAGNOSIS — E78.00 HYPERCHOLESTEROLEMIA: ICD-10-CM

## 2023-04-24 DIAGNOSIS — I10 ESSENTIAL HYPERTENSION: ICD-10-CM

## 2023-04-24 DIAGNOSIS — E66.01 MORBID OBESITY WITH BMI OF 40.0-44.9, ADULT: ICD-10-CM

## 2023-04-24 PROBLEM — Z23 NEED FOR INFLUENZA VACCINATION: Status: RESOLVED | Noted: 2019-12-02 | Resolved: 2023-04-24

## 2023-04-24 PROBLEM — Z23 NEED FOR TDAP VACCINATION: Status: RESOLVED | Noted: 2019-12-02 | Resolved: 2023-04-24

## 2023-04-24 PROCEDURE — 99214 OFFICE O/P EST MOD 30 MIN: CPT | Performed by: PHYSICIAN ASSISTANT

## 2023-04-24 RX ORDER — SEMAGLUTIDE 0.25 MG/.5ML
0.25 INJECTION, SOLUTION SUBCUTANEOUS WEEKLY
Qty: 2 ML | Refills: 0 | Status: SHIPPED | OUTPATIENT
Start: 2023-04-24

## 2023-04-24 NOTE — PROGRESS NOTES
"Chief Complaint  Hypertension (/Management), Hyperlipidemia (Management), and Prediabetes (Management)    Subjective          Juliet Fowler presents to [facility] PRIMARY CARE  History of Present Illness  Juliet, \"Scarlet\", is a 53-year-old female who presents for prediabetes, hypertension and hypercholesterolemia management.  She has lost 4 pounds since last office visit in October 2022.  She is non fasting today.  Scarlet states she has been trying to make dietary changes but decreasing portion sizes.  She has also been drinking more water and less sugar/carbohydrates.  Currently going to [employer] twice weekly to exercise.  States she is doing a \"circuit\" workout.  Bowel movements have been daily without dark black tarry stools.  Sleep has been normal.  Blood pressure has been normal running around 120/80 at home.  Has been decreasing salt intake.  She denied any chest pain, shortness of air, cough, wheezing, abdominal pain, GI upset or swelling of ankles.     Objective   Vital Signs:   /80 (BP Location: Left arm, Patient Position: Sitting, Cuff Size: Large Adult)   Pulse 96   Temp 98 °F (36.7 °C)   Resp 15   Ht 160 cm (63\")   Wt 111 kg (244 lb)   SpO2 98%   BMI 43.22 kg/m²     Physical Exam  Vitals and nursing note reviewed.   Constitutional:       Appearance: Normal appearance. She is well-developed and well-groomed. She is morbidly obese.   HENT:      Head: Normocephalic and atraumatic.   Neck:      Thyroid: No thyroid mass, thyromegaly or thyroid tenderness.      Vascular: No carotid bruit.      Trachea: Trachea and phonation normal. No tracheal tenderness.   Cardiovascular:      Rate and Rhythm: Normal rate and regular rhythm.      Pulses: Normal pulses.      Heart sounds: Normal heart sounds, S1 normal and S2 normal. No murmur heard.  Pulmonary:      Effort: Pulmonary effort is normal.      Breath sounds: Normal breath sounds and air entry.   Abdominal:      General: Bowel " sounds are normal.      Palpations: Abdomen is soft. There is no hepatomegaly.      Tenderness: There is no abdominal tenderness. There is no right CVA tenderness, left CVA tenderness, guarding or rebound. Negative signs include Mahmood's sign, Rovsing's sign, McBurney's sign, psoas sign and obturator sign.   Musculoskeletal:      Cervical back: Neck supple.      Right lower leg: No edema.      Left lower leg: No edema.   Skin:     General: Skin is warm and dry.      Capillary Refill: Capillary refill takes less than 2 seconds.   Neurological:      Mental Status: She is alert and oriented to person, place, and time.   Psychiatric:         Attention and Perception: Attention and perception normal.         Mood and Affect: Mood and affect normal.         Speech: Speech normal.         Behavior: Behavior normal. Behavior is cooperative.         Thought Content: Thought content normal.         Cognition and Memory: Cognition and memory normal.         Judgment: Judgment normal.          Patient's (Body mass index is 43.22 kg/m².) indicates that they are morbidly obese (BMI > 40 or > 35 with obesity - related health condition) with health related conditions that include hypertension, dyslipidemias and prediabetes . Weight is improving with lifestyle modifications. BMI is is above average; BMI management plan is completed. We discussed low calorie, low carb based diet program, portion control, increasing exercise, joining a fitness center or start home based exercise program and pharmacologic options including Wegovy.   Result Review :                 Assessment and Plan    Diagnoses and all orders for this visit:    1. Prediabetes (Primary)  -     CBC (No Diff)  -     Comprehensive Metabolic Panel  -     Hemoglobin A1c  -     Lipid Panel With LDL / HDL Ratio  -     Semaglutide-Weight Management (Wegovy) 0.25 MG/0.5ML solution auto-injector; Inject 0.25 mg under the skin into the appropriate area as directed 1 (One) Time  Per Week.  Dispense: 2 mL; Refill: 0    2. Essential hypertension    3. Hypercholesterolemia  -     CBC (No Diff)  -     Comprehensive Metabolic Panel  -     Lipid Panel With LDL / HDL Ratio  -     Semaglutide-Weight Management (Wegovy) 0.25 MG/0.5ML solution auto-injector; Inject 0.25 mg under the skin into the appropriate area as directed 1 (One) Time Per Week.  Dispense: 2 mL; Refill: 0    4. Morbid obesity with BMI of 40.0-44.9, adult  -     Semaglutide-Weight Management (Wegovy) 0.25 MG/0.5ML solution auto-injector; Inject 0.25 mg under the skin into the appropriate area as directed 1 (One) Time Per Week.  Dispense: 2 mL; Refill: 0    1.  Chronic and stable prediabetes with hypercholesteremia: Scarlet is non fasting today.  She will return to office for fasting blood work that includes CBC, CMP, lipid profile and hemoglobin A1c.  She will be notified of test results and a medication changes.  2.  Chronic and stable hypertension: I have rechecked blood pressure and got 120/80 in left arm.  Continue her current Diovan medication at home as directed.  Follow-up in 6 months.  3.  Chronic and improving morbid obesity: She has lost 4 pounds since last office visit in October.  We have discussed different weight loss options.  She would like to proceed with  Wegovy medication.  I have discussed how to take the medication and possible side effects.  She will continue to decrease her sugar, carbohydrates and portion sizes.  Continue to increase physical activity as well.  Return to office in 1 month for reevaluation of weight.  She will notify office of any side effects to the medication.    I spent 28 minutes caring for Juliet on this date of service. This time includes time spent by me in the following activities:preparing for the visit, obtaining and/or reviewing a separately obtained history, performing a medically appropriate examination and/or evaluation , counseling and educating the patient/family/caregiver,  ordering medications, tests, or procedures and documenting information in the medical record  Follow Up   Return in about 4 weeks (around 5/22/2023), or weight-Do FBW in next week.  Patient was given instructions and counseling regarding her condition or for health maintenance advice. Please see specific information pulled into the AVS if appropriate.     GISELA Browning [facility] MEDICINE  [address] [place] [id number]  Dept: [phone]  Dept Fax: [phone]  Loc: [phone]  Loc Fax: [phone]        Answers for HPI/ROS submitted by the patient on 4/23/2023  What is the primary reason for your visit?: Physical

## 2023-04-29 LAB
ALBUMIN SERPL-MCNC: 4.1 G/DL (ref 3.5–5.2)
ALBUMIN/GLOB SERPL: 1.5 G/DL
ALP SERPL-CCNC: 88 U/L (ref 39–117)
ALT SERPL-CCNC: 17 U/L (ref 1–33)
AST SERPL-CCNC: 17 U/L (ref 1–32)
BILIRUB SERPL-MCNC: 0.2 MG/DL (ref 0–1.2)
BUN SERPL-MCNC: 12 MG/DL (ref 6–20)
BUN/CREAT SERPL: 16.4 (ref 7–25)
CALCIUM SERPL-MCNC: 9.8 MG/DL (ref 8.6–10.5)
CHLORIDE SERPL-SCNC: 102 MMOL/L (ref 98–107)
CHOLEST SERPL-MCNC: 200 MG/DL (ref 0–200)
CO2 SERPL-SCNC: 28.6 MMOL/L (ref 22–29)
CREAT SERPL-MCNC: 0.73 MG/DL (ref 0.57–1)
EGFRCR SERPLBLD CKD-EPI 2021: 99.1 ML/MIN/1.73
ERYTHROCYTE [DISTWIDTH] IN BLOOD BY AUTOMATED COUNT: 12.2 % (ref 12.3–15.4)
GLOBULIN SER CALC-MCNC: 2.8 GM/DL
GLUCOSE SERPL-MCNC: 100 MG/DL (ref 65–99)
HBA1C MFR BLD: 5.9 % (ref 4.8–5.6)
HCT VFR BLD AUTO: 37.4 % (ref 34–46.6)
HDLC SERPL-MCNC: 40 MG/DL (ref 40–60)
HGB BLD-MCNC: 12.6 G/DL (ref 12–15.9)
LDLC SERPL CALC-MCNC: 128 MG/DL (ref 0–100)
LDLC/HDLC SERPL: 3.1 {RATIO}
MCH RBC QN AUTO: 31.3 PG (ref 26.6–33)
MCHC RBC AUTO-ENTMCNC: 33.7 G/DL (ref 31.5–35.7)
MCV RBC AUTO: 93 FL (ref 79–97)
PLATELET # BLD AUTO: 398 10*3/MM3 (ref 140–450)
POTASSIUM SERPL-SCNC: 4.1 MMOL/L (ref 3.5–5.2)
PROT SERPL-MCNC: 6.9 G/DL (ref 6–8.5)
RBC # BLD AUTO: 4.02 10*6/MM3 (ref 3.77–5.28)
SODIUM SERPL-SCNC: 139 MMOL/L (ref 136–145)
TRIGL SERPL-MCNC: 180 MG/DL (ref 0–150)
VLDLC SERPL CALC-MCNC: 32 MG/DL (ref 5–40)
WBC # BLD AUTO: 10.33 10*3/MM3 (ref 3.4–10.8)

## 2023-05-26 ENCOUNTER — OFFICE VISIT (OUTPATIENT)
Dept: FAMILY MEDICINE CLINIC | Facility: CLINIC | Age: 53
End: 2023-05-26
Payer: COMMERCIAL

## 2023-05-26 VITALS
DIASTOLIC BLOOD PRESSURE: 82 MMHG | OXYGEN SATURATION: 98 % | HEART RATE: 83 BPM | TEMPERATURE: 97.7 F | WEIGHT: 244 LBS | SYSTOLIC BLOOD PRESSURE: 120 MMHG | BODY MASS INDEX: 43.23 KG/M2 | RESPIRATION RATE: 15 BRPM | HEIGHT: 63 IN

## 2023-05-26 DIAGNOSIS — E66.01 MORBID OBESITY WITH BMI OF 40.0-44.9, ADULT: Primary | ICD-10-CM

## 2023-05-26 PROCEDURE — 99213 OFFICE O/P EST LOW 20 MIN: CPT | Performed by: PHYSICIAN ASSISTANT

## 2023-05-26 RX ORDER — SEMAGLUTIDE 0.5 MG/.5ML
0.5 INJECTION, SOLUTION SUBCUTANEOUS WEEKLY
Qty: 2 ML | Refills: 0 | Status: SHIPPED | OUTPATIENT
Start: 2023-05-26

## 2023-05-26 NOTE — PROGRESS NOTES
"Chief Complaint  Obesity    Subjective          Juliet Fowler presents to [facility] PRIMARY CARE  History of Present Illness  Scarlet is a 53-year-old female for obesity management.  She has not los any weight since starting the Wegovy medication.  States overall she is doing well with the medication.  The medication caused some nausea symptoms but not bad.  She has been making dietary changes decreasing sugar, carbohydrates and fatty foods in the diet. Sleep has been normal.     Objective   Vital Signs:   /82   Pulse 83   Temp 97.7 °F (36.5 °C)   Resp 15   Ht 160 cm (63\")   Wt 111 kg (244 lb)   SpO2 98%   BMI 43.22 kg/m²     Physical Exam  Vitals and nursing note reviewed.   Constitutional:       Appearance: Normal appearance. She is well-developed and well-groomed. She is morbidly obese.      Interventions: Face mask in place.   HENT:      Head: Normocephalic and atraumatic.   Neck:      Vascular: No carotid bruit.      Trachea: Trachea and phonation normal. No tracheal tenderness.   Cardiovascular:      Rate and Rhythm: Normal rate and regular rhythm.      Pulses: Normal pulses.      Heart sounds: Normal heart sounds, S1 normal and S2 normal. No murmur heard.  Pulmonary:      Effort: Pulmonary effort is normal.      Breath sounds: Normal breath sounds and air entry.   Abdominal:      General: Bowel sounds are normal.      Palpations: Abdomen is soft. There is no hepatomegaly.      Tenderness: There is no abdominal tenderness.   Musculoskeletal:      Cervical back: Neck supple.      Right lower leg: No edema.      Left lower leg: No edema.   Skin:     General: Skin is warm and dry.      Capillary Refill: Capillary refill takes less than 2 seconds.   Neurological:      Mental Status: She is alert and oriented to person, place, and time.   Psychiatric:         Attention and Perception: Attention and perception normal.         Mood and Affect: Mood and affect normal.         Speech: " Speech normal.         Behavior: Behavior normal. Behavior is cooperative.         Thought Content: Thought content normal.         Cognition and Memory: Cognition and memory normal.         Judgment: Judgment normal.        Patient's (Body mass index is 43.22 kg/m².) indicates that they are morbidly obese (BMI > 40 or > 35 with obesity - related health condition) with health related conditions that include none . Weight is unchanged. BMI is is above average; BMI management plan is completed. We discussed low calorie, low carb based diet program, portion control, increasing exercise, joining a fitness center or start home based exercise program, Weight Watchers or other Commercial based weight reduction program, pharmacologic options including Wegovy and an abby-based approach such as [employer] Pal or Lose It.     Result Review :                 Assessment and Plan    Diagnoses and all orders for this visit:    1. Morbid obesity with BMI of 40.0-44.9, adult (Primary)  -     Semaglutide-Weight Management (Wegovy) 0.5 MG/0.5ML solution auto-injector; Inject 0.5 mL under the skin into the appropriate area as directed 1 (One) Time Per Week. BMI 43.2  Dispense: 2 mL; Refill: 0    Scarlet was seen in the office today for morbid obesity management.  Doing well with the Wegovy medication.  I will increase Wegovy to 0.5 mg once weekly.  She will update weight  in 1 month.  I  will consider patient increasing dose as tolerated.  Follow-up in office in 3 months.    I spent 16 minutes caring for Juliet on this date of service. This time includes time spent by me in the following activities:preparing for the visit, obtaining and/or reviewing a separately obtained history, performing a medically appropriate examination and/or evaluation , counseling and educating the patient/family/caregiver, ordering medications, tests, or procedures and documenting information in the medical record  Follow Up   Return in about 3 months (around  8/26/2023), or weight.  Patient was given instructions and counseling regarding her condition or for health maintenance advice. Please see specific information pulled into the AVS if appropriate.     GISELA [facility]ning [facility] FAMILY MEDICINE  [address] [place] [id number]  Dept: [phone]  Dept Fax: [phone]  Loc: [phone]  Loc Fax: [phone]

## 2023-05-30 DIAGNOSIS — E66.01 MORBID OBESITY WITH BMI OF 40.0-44.9, ADULT: ICD-10-CM

## 2023-05-30 DIAGNOSIS — E66.01 MORBID OBESITY WITH BMI OF 40.0-44.9, ADULT: Primary | ICD-10-CM

## 2023-05-30 RX ORDER — SEMAGLUTIDE 1 MG/.5ML
1 INJECTION, SOLUTION SUBCUTANEOUS WEEKLY
Qty: 2 ML | Refills: 0 | Status: SHIPPED | OUTPATIENT
Start: 2023-05-30 | End: 2023-06-01 | Stop reason: RX

## 2023-05-30 RX ORDER — SEMAGLUTIDE 1 MG/.5ML
1 INJECTION, SOLUTION SUBCUTANEOUS WEEKLY
Qty: 2 ML | Refills: 0 | Status: SHIPPED | OUTPATIENT
Start: 2023-05-30 | End: 2023-05-30 | Stop reason: SDUPTHER

## 2023-06-01 DIAGNOSIS — E66.01 MORBID OBESITY WITH BMI OF 40.0-44.9, ADULT: Primary | ICD-10-CM

## 2023-06-15 ENCOUNTER — TRANSCRIBE ORDERS (OUTPATIENT)
Dept: ADMINISTRATIVE | Facility: HOSPITAL | Age: 53
End: 2023-06-15
Payer: COMMERCIAL

## 2023-06-15 DIAGNOSIS — Z12.31 VISIT FOR SCREENING MAMMOGRAM: Primary | ICD-10-CM

## 2023-06-16 DIAGNOSIS — R12 HEARTBURN: ICD-10-CM

## 2023-06-16 RX ORDER — OMEPRAZOLE 40 MG/1
CAPSULE, DELAYED RELEASE ORAL
Qty: 90 CAPSULE | Refills: 2 | Status: SHIPPED | OUTPATIENT
Start: 2023-06-16

## 2024-02-09 ENCOUNTER — OFFICE VISIT (OUTPATIENT)
Dept: FAMILY MEDICINE CLINIC | Facility: CLINIC | Age: 54
End: 2024-02-09
Payer: COMMERCIAL

## 2024-02-09 VITALS
SYSTOLIC BLOOD PRESSURE: 140 MMHG | TEMPERATURE: 99.7 F | OXYGEN SATURATION: 96 % | HEIGHT: 63 IN | HEART RATE: 110 BPM | BODY MASS INDEX: 45.18 KG/M2 | WEIGHT: 255 LBS | DIASTOLIC BLOOD PRESSURE: 100 MMHG

## 2024-02-09 DIAGNOSIS — R50.9 FEVER, UNSPECIFIED FEVER CAUSE: ICD-10-CM

## 2024-02-09 DIAGNOSIS — R52 BODY ACHES: ICD-10-CM

## 2024-02-09 DIAGNOSIS — J02.9 PHARYNGITIS, UNSPECIFIED ETIOLOGY: ICD-10-CM

## 2024-02-09 DIAGNOSIS — R09.81 NASAL CONGESTION: ICD-10-CM

## 2024-02-09 DIAGNOSIS — J02.9 SORE THROAT: Primary | ICD-10-CM

## 2024-02-09 LAB
EXPIRATION DATE: NORMAL
EXPIRATION DATE: NORMAL
FLUAV AG UPPER RESP QL IA.RAPID: NOT DETECTED
FLUBV AG UPPER RESP QL IA.RAPID: NOT DETECTED
INTERNAL CONTROL: NORMAL
INTERNAL CONTROL: NORMAL
Lab: NORMAL
Lab: NORMAL
S PYO AG THROAT QL: NEGATIVE
SARS-COV-2 AG UPPER RESP QL IA.RAPID: NOT DETECTED

## 2024-02-09 PROCEDURE — 87428 SARSCOV & INF VIR A&B AG IA: CPT | Performed by: NURSE PRACTITIONER

## 2024-02-09 PROCEDURE — 87880 STREP A ASSAY W/OPTIC: CPT | Performed by: NURSE PRACTITIONER

## 2024-02-09 PROCEDURE — 99213 OFFICE O/P EST LOW 20 MIN: CPT | Performed by: NURSE PRACTITIONER

## 2024-02-09 RX ORDER — AZITHROMYCIN 250 MG/1
TABLET, FILM COATED ORAL
Qty: 6 TABLET | Refills: 0 | Status: SHIPPED | OUTPATIENT
Start: 2024-02-09

## 2024-02-09 NOTE — PROGRESS NOTES
"Chief Complaint   Patient presents with    Sore Throat    Fever    Nasal Congestion    Generalized Body Aches    Headache       Upper Respiratory Infection: Patient complains of symptoms of a URI. Symptoms include congestion, fever, sore throat, and headache . Onset of symptoms was 2 days ago, gradually worsening since that time. She also c/o achiness, congestion, fever 101.5, and sore throat for the past 2 days .  She is drinking moderate amounts of fluids. Evaluation to date: none. Treatment to date: none.  Ill contacts at home or school or work discussed. After  - [facility].      The following portions of the patient's history were reviewed and updated as appropriate: allergies, current medications, past family history, past medical history, past social history, past surgical history and problem list.        Vitals:    02/09/24 1057   BP: 140/100   Pulse: 110   Temp: 99.7 °F (37.6 °C)   SpO2: 96%   Weight: 116 kg (255 lb)   Height: 160 cm (63\")     Gen: Mildly ill appearing, alert, masked  Ears: TM's normal without redness  Nose:  Congestion  Throat:  Red without exudate, some drainage  Neck: No LAD  Lung: Good air movement, regular RR  Heart: RR without murmur  Skin: No rash    POCT rapid strep A (02/09/2024 11:34)    POCT SARS-CoV-2 Antigen LORIN + Flu (02/09/2024 11:34)    Assessment & Plan   Diagnoses and all orders for this visit:    1. Sore throat (Primary)  -     POCT SARS-CoV-2 Antigen LROIN + Flu  -     POCT rapid strep A    2. Fever, unspecified fever cause  -     POCT SARS-CoV-2 Antigen LORIN + Flu  -     POCT rapid strep A    3. Nasal congestion  -     POCT SARS-CoV-2 Antigen LORIN + Flu    4. Body aches  -     POCT SARS-CoV-2 Antigen LORIN + Flu    5. Pharyngitis, unspecified etiology  -     azithromycin (Zithromax Z-Lincoln) 250 MG tablet; Take 2 tablets the first day, then 1 tablet daily for 4 days.  Dispense: 6 tablet; Refill: 0             Recommend to re-test for Covid over the weekend. "    Tylenol or Advil as needed for pain, fever, muscle aches  Plenty of fluids  Hand washing discussed  Off work or school note given if needed.  Warm tea for throat.  Pros and cons of antibiotic use discussed.  Instructed to notify us if symptoms worsen or do not improve.      Patient was wearing face mask when I entered the room and throughout our encounter. Protective equipment was worn throughout this patient encounter including a face mask.  Hand hygiene was performed before donning protective equipment and after removal when leaving the room.     Tiff Roe, CLINT  Family Practice  [facility] [place]

## 2024-02-10 ENCOUNTER — TELEPHONE (OUTPATIENT)
Dept: INTERNAL MEDICINE | Facility: CLINIC | Age: 54
End: 2024-02-10
Payer: COMMERCIAL

## 2024-02-10 DIAGNOSIS — U07.1 COVID-19: Primary | ICD-10-CM

## 2024-02-10 NOTE — TELEPHONE ENCOUNTER
Patient was seen in the office by CLINT Palmer, yesterday with upper respiratory symptoms, testing negative for flu and SARS-Covid19. She tested positive today and called the on-call service requesting Paxlovid on the advice of her PCP. Allergies and medications reviewed with patient and script for Paxlovid sent to patient's pharmacy.

## 2024-03-13 ENCOUNTER — OFFICE VISIT (OUTPATIENT)
Dept: ORTHOPEDIC SURGERY | Facility: CLINIC | Age: 54
End: 2024-03-13
Payer: COMMERCIAL

## 2024-03-13 VITALS
SYSTOLIC BLOOD PRESSURE: 167 MMHG | HEART RATE: 82 BPM | HEIGHT: 63 IN | DIASTOLIC BLOOD PRESSURE: 96 MMHG | WEIGHT: 256 LBS | BODY MASS INDEX: 45.36 KG/M2

## 2024-03-13 DIAGNOSIS — M17.12 PRIMARY OSTEOARTHRITIS OF LEFT KNEE: Primary | ICD-10-CM

## 2024-03-13 RX ADMIN — LIDOCAINE HYDROCHLORIDE 8 ML: 10 INJECTION, SOLUTION EPIDURAL; INFILTRATION; INTRACAUDAL; PERINEURAL at 08:59

## 2024-03-13 RX ADMIN — TRIAMCINOLONE ACETONIDE 80 MG: 40 INJECTION, SUSPENSION INTRA-ARTICULAR; INTRAMUSCULAR at 08:59

## 2024-03-13 NOTE — PROGRESS NOTES
Subjective:     Patient ID: Juliet Fowler is a 54 y.o. female.    Chief Complaint:  DJD left knee  History of Present Illness  Juliet Fowler Returns to clinic today for evaluation of her left knee.  She has been seen in past pain at the left knee received corticosteroid injections in the past with significant symptom improvement until recently.  She is experiencing pain that is returned to the anterior aspect of the left knee in the medial compartment.  Rates discomfort a 5 out of a 10 aching in nature symptoms have returned over the last few months she did note significant symptom improvement with prior corticosteroid injection was able to return to all previously tolerated activities for the last 2 years.  Pain present with transitional activity symptoms seated standing attempting to walk, ascending descending stairs ambulating long distances and standing for extended periods of time.  Denies any acute injury denies any other concerns present.       Social History     Occupational History    Not on file   Tobacco Use    Smoking status: Never     Passive exposure: Never    Smokeless tobacco: Never   Vaping Use    Vaping status: Never Used   Substance and Sexual Activity    Alcohol use: Never    Drug use: Never    Sexual activity: Defer      Past Medical History:   Diagnosis Date    Asthma     Chondromalacia of knee, left     COVID-19 11/12/2020    Heartburn     Hypercholesterolemia 1/25/2018    Hypertension     Obesity (BMI 30-39.9) 7/18/2019     Past Surgical History:   Procedure Laterality Date    CHOLECYSTECTOMY         Family History   Problem Relation Age of Onset    Heart disease Maternal Grandmother     Hypertension Maternal Grandmother     Lung disease Maternal Grandmother     Kidney disease Maternal Grandmother     Melanoma Maternal Grandmother     Prostate cancer Maternal Grandfather     Heart disease Paternal Grandfather     Hypertension Father     Melanoma Maternal Uncle     Breast cancer Neg Hx   "              Objective:  Physical Exam    Vital signs reviewed.   General: No acute distress.  Eyes: conjunctiva clear; pupils equally round and reactive  ENT: external ears and nose atraumatic; oropharynx clear  CV: no peripheral edema  Resp: normal respiratory effort  Skin: no rashes or wounds; normal turgor  Psych: mood and affect appropriate; recent and remote memory intact    Vitals:    03/13/24 0833   BP: 167/96   Pulse: 82   Weight: 116 kg (256 lb)   Height: 160 cm (63\")         03/13/24  0833   Weight: 116 kg (256 lb)     Body mass index is 45.35 kg/m².      Left Knee Exam     Tenderness   The patient is experiencing tenderness in the medial joint line and patella.    Range of Motion   Extension:  0   Left knee flexion: 125.     Tests   Yue:  Medial - negative Lateral - negative  Varus: negative Valgus: negative  Lachman:  Anterior - 1+      Drawer:  Anterior - negative     Posterior - negative  Patellar apprehension: positive    Other   Erythema: absent  Sensation: normal  Pulse: present  Swelling: moderate               Imaging:  Left Knee X-Ray  Indication: Pain    AP, Lateral, and [place] views    Findings:  No fracture  No bony lesion  Normal soft tissues  Medial compartment narrowing, moderate, moderate patellofemoral narrowing with reactive osteophytes patellofemoral joint  prior studies were available for comparison.    Assessment:        1. Primary osteoarthritis of left knee           Plan:  Discussed plan of care with patient.  We did discuss repeat corticosteroid injection which has provided her significant symptom in the past.  Do recommend the patient ice injection site this evening may take 3 to 7 days before she begins experiencing significant symptom improvement.  We can repeat injection at 3-month intervals.  Will plan to repeat up to her she has received significant symptom improvement with previous corticosteroid injections.  I do recommend quad strengthening history strengthening " calf strengthening exercises.  All questions answered.  Large Joint Arthrocentesis: L knee  Date/Time: 3/13/2024 8:59 AM  Consent given by: patient  Site marked: site marked  Timeout: Immediately prior to procedure a time out was called to verify the correct patient, procedure, equipment, support staff and site/side marked as required   Supporting Documentation  Indications: pain   Procedure Details  Location: knee - L knee  Preparation: Patient was prepped and draped in the usual sterile fashion  Needle size: 22 G  Approach: anterolateral  Medications administered: 8 mL lidocaine PF 1% 1 %; 80 mg triamcinolone acetonide 40 MG/ML  Patient tolerance: patient tolerated the procedure well with no immediate complications          Orders:  Orders Placed This Encounter   Procedures    Large Joint Arthrocentesis: L knee    XR Knee 3+ View With [place] Left     No orders of the defined types were placed in this encounter.            Dragon dictation utilized

## 2024-03-14 RX ORDER — LIDOCAINE HYDROCHLORIDE 10 MG/ML
8 INJECTION, SOLUTION EPIDURAL; INFILTRATION; INTRACAUDAL; PERINEURAL
Status: COMPLETED | OUTPATIENT
Start: 2024-03-13 | End: 2024-03-13

## 2024-03-14 RX ORDER — TRIAMCINOLONE ACETONIDE 40 MG/ML
80 INJECTION, SUSPENSION INTRA-ARTICULAR; INTRAMUSCULAR
Status: COMPLETED | OUTPATIENT
Start: 2024-03-13 | End: 2024-03-13

## 2024-04-17 ENCOUNTER — OFFICE VISIT (OUTPATIENT)
Dept: FAMILY MEDICINE CLINIC | Facility: CLINIC | Age: 54
End: 2024-04-17
Payer: COMMERCIAL

## 2024-04-17 VITALS
HEART RATE: 81 BPM | RESPIRATION RATE: 14 BRPM | HEIGHT: 63 IN | OXYGEN SATURATION: 99 % | DIASTOLIC BLOOD PRESSURE: 82 MMHG | BODY MASS INDEX: 44.12 KG/M2 | TEMPERATURE: 97.3 F | WEIGHT: 249 LBS | SYSTOLIC BLOOD PRESSURE: 128 MMHG

## 2024-04-17 DIAGNOSIS — Z76.89 ENCOUNTER FOR WEIGHT MANAGEMENT: ICD-10-CM

## 2024-04-17 DIAGNOSIS — E78.00 HYPERCHOLESTEROLEMIA: ICD-10-CM

## 2024-04-17 DIAGNOSIS — Z12.12 SCREENING FOR MALIGNANT NEOPLASM OF THE RECTUM: ICD-10-CM

## 2024-04-17 DIAGNOSIS — E66.01 MORBID OBESITY WITH BMI OF 40.0-44.9, ADULT: ICD-10-CM

## 2024-04-17 DIAGNOSIS — Z12.11 SPECIAL SCREENING FOR MALIGNANT NEOPLASMS, COLON: ICD-10-CM

## 2024-04-17 DIAGNOSIS — Z00.00 ANNUAL PHYSICAL EXAM: Primary | ICD-10-CM

## 2024-04-17 DIAGNOSIS — R12 HEARTBURN: ICD-10-CM

## 2024-04-17 DIAGNOSIS — I10 ESSENTIAL HYPERTENSION: ICD-10-CM

## 2024-04-17 RX ORDER — OMEPRAZOLE 40 MG/1
40 CAPSULE, DELAYED RELEASE ORAL DAILY
Qty: 90 CAPSULE | Refills: 0 | Status: SHIPPED | OUTPATIENT
Start: 2024-04-17

## 2024-04-17 RX ORDER — VALSARTAN AND HYDROCHLOROTHIAZIDE 80; 12.5 MG/1; MG/1
1 TABLET, FILM COATED ORAL DAILY
Qty: 90 TABLET | Refills: 3 | Status: SHIPPED | OUTPATIENT
Start: 2024-04-17

## 2024-04-17 NOTE — PROGRESS NOTES
"Chief Complaint  Annual Exam, Hypertension (Management), Hyperlipidemia (Management), and Obesity (Management)    Subjective          Juliet Fowler presents to [facility]  History of Present Illness  Juliet,\"Andres",is a 54-year-old female who presents for annual physical exam, hypertension, hyperlipidemia and obesity management.  She has lost 5 pounds since last office visit in October 2023.  States her insurance was not covering that Wegov[employer] any longer.  She has been trying to make healthier choices in diet.  She has been trying to walk for exercise at least 5 times weekly.  She has been more active at home.  Sleep has been normal.  Bowel movements are daily without dark black tarry stools.  States she had COVID in February 2024 with a mild case.  States she has recuperated.  Has been compliant with medication.  Scarlet denied any current fevers, chills, upper respiratory symptoms, chest pain, shortness of air, cough, wheezing, abdominal pain, GI upset, urinary symptoms or swelling of ankles.     Objective   Vital Signs:   /82 (BP Location: Left arm, Patient Position: Sitting, Cuff Size: Large Adult)   Pulse 81   Temp 97.3 °F (36.3 °C)   Resp 14   Ht 160 cm (63\")   Wt 113 kg (249 lb)   SpO2 99%   BMI 44.11 kg/m²     Physical Exam  Vitals and nursing note reviewed.   Constitutional:       Appearance: Normal appearance. She is well-developed and well-groomed. She is morbidly obese.   HENT:      Head: Normocephalic and atraumatic.      Jaw: There is normal jaw occlusion.      Right Ear: Hearing, tympanic membrane, ear canal and external ear normal.      Left Ear: Hearing, tympanic membrane, ear canal and external ear normal.      Nose: Nose normal.      Right Sinus: No maxillary sinus tenderness or frontal sinus tenderness.      Left Sinus: No maxillary sinus tenderness or frontal sinus tenderness.      Mouth/Throat:      Lips: Pink.      Mouth: Mucous membranes are moist.      " Dentition: Normal dentition.      Tongue: No lesions.      Pharynx: Oropharynx is clear. Uvula midline.      Tonsils: No tonsillar exudate.   Eyes:      General: Lids are normal.      Conjunctiva/sclera: Conjunctivae normal.      Pupils: Pupils are equal, round, and reactive to light.   Neck:      Thyroid: No thyroid mass, thyromegaly or thyroid tenderness.      Vascular: No carotid bruit.      Trachea: Trachea and phonation normal. No tracheal tenderness.   Cardiovascular:      Rate and Rhythm: Normal rate and regular rhythm.      Pulses: Normal pulses.      Heart sounds: Normal heart sounds, S1 normal and S2 normal. No murmur heard.  Pulmonary:      Effort: Pulmonary effort is normal.      Breath sounds: Normal breath sounds and air entry.   Abdominal:      General: Bowel sounds are normal.      Palpations: Abdomen is soft.      Tenderness: There is no abdominal tenderness. There is no right CVA tenderness, left CVA tenderness, guarding or rebound. Negative signs include Mahmood's sign, Rovsing's sign, McBurney's sign, psoas sign and obturator sign.   Musculoskeletal:      Cervical back: Neck supple.      Right lower leg: No edema.      Left lower leg: No edema.   Lymphadenopathy:      Cervical: No cervical adenopathy.      Right cervical: No superficial, deep or posterior cervical adenopathy.     Left cervical: No superficial, deep or posterior cervical adenopathy.   Skin:     General: Skin is warm and dry.      Capillary Refill: Capillary refill takes less than 2 seconds.   Neurological:      Mental Status: She is alert and oriented to person, place, and time.      Deep Tendon Reflexes:      Reflex Scores:       Patellar reflexes are 2+ on the right side and 2+ on the left side.  Psychiatric:         Attention and Perception: Attention and perception normal.         Mood and Affect: Mood and affect normal.         Speech: Speech normal.         Behavior: Behavior is cooperative.         Thought Content: Thought  content normal.         Cognition and Memory: Cognition and memory normal.         Judgment: Judgment normal.        Result Review :           SCANNED EKG (12/14/2020)       ECG 12 Lead    Date/Time: 4/17/2024 10:19 AM  Performed by: Heidi Moe PA-C    Authorized by: Heidi Moe PA-C  Comparison: compared with previous ECG from 12/14/2020  Similar to previous ECG  Rhythm: sinus rhythm  Rate: normal  BPM: 76  Conduction: conduction normal  ST Segments: ST segments normal  QRS axis: normal    Clinical impression: normal ECG  Comments: Indication: Annual physical exam with hypertension  ND int:  161 ms  QRS dur:  76 ms  QT/QTc:  364/395 ms              Assessment and Plan    Diagnoses and all orders for this visit:    1. Annual physical exam (Primary)  -     CBC (No Diff)  -     Comprehensive Metabolic Panel  -     Lipid Panel With LDL / HDL Ratio  -     TSH  -     ECG 12 Lead    2. Hypercholesterolemia  -     CBC (No Diff)  -     Comprehensive Metabolic Panel  -     Lipid Panel With LDL / HDL Ratio  -     Tirzepatide-Weight Management (ZEPBOUND) 2.5 MG/0.5ML solution auto-injector; Inject 0.5 mL under the skin into the appropriate area as directed 1 (One) Time Per Week. BMI 44.1  Dispense: 2 mL; Refill: 0    3. Essential hypertension  -     valsartan-hydrochlorothiazide (DIOVAN-HCT) 80-12.5 MG per tablet; Take 1 tablet by mouth Daily.  Dispense: 90 tablet; Refill: 3  -     Tirzepatide-Weight Management (ZEPBOUND) 2.5 MG/0.5ML solution auto-injector; Inject 0.5 mL under the skin into the appropriate area as directed 1 (One) Time Per Week. BMI 44.1  Dispense: 2 mL; Refill: 0  -     ECG 12 Lead    4. Special screening for malignant neoplasms, colon  -     Cologuard - Stool, Per Rectum; Future    5. Screening for malignant neoplasm of the rectum  -     Cologuard - Stool, Per Rectum; Future    6. Heartburn  -     omeprazole (priLOSEC) 40 MG capsule; Take 1 capsule by mouth Daily.  Dispense: 90 capsule;  Refill: 0    7. Morbid obesity with BMI of 40.0-44.9, adult    8. Encounter for weight management  -     Tirzepatide-Weight Management (ZEPBOUND) 2.5 MG/0.5ML solution auto-injector; Inject 0.5 mL under the skin into the appropriate area as directed 1 (One) Time Per Week. BMI 44.1  Dispense: 2 mL; Refill: 0    Annual physical exam with chronic and stable hyperlipidemia: She is fasting will have a CBC, CMP, lipid profile and TSH collected today.  Scarlet will be notified of test results when completed.    Chronic and stable hypertension: I have rechecked blood pressure and got 120/80 in the left arm.  In office  EKG today showed normal sinus rhythm.  She will continue her current Diovan/HCTZ medication at home as directed.  Follow-up in 6 months.  Need for screening colon and rectal cancer: She is due for Cologuard.  Have placed orders.  She will be notified of test results when completed.  Chronic and stable heartburn: Have refilled her Prilosec medication to pharmacy.  Chronic and improving morbid obesity with weight management.  We have discussed Zepbound occasion.  Have sent a prescription to pharmacy.  She was instructed how to take the medication and possible side effects.  She will return to office in 1 month for reevaluation.  She will continue making healthier choices in diet by decreasing sugar, carbohydrates and fatty food.  Also continue to exercise regularly.      Patient Counseling:  -Nutrition: Stressed importance of moderation in sodium/caffeine intake, saturated fat and cholesterol.  Discussed caloric balance, sufficient intake of fresh fruits, vegetables, fiber,   calcium, iron.  --Discussed the new recommendation against daily use of baby aspirin for primary prevention in low risk patients.  --Exercise: Stressed the importance of regular exercise by incorporating into daily routine.    --Substance Abuse: Discussed cessation/primary prevention of tobacco, alcohol, or other drug use; driving or other  dangerous activities under the influence.    --Dental health: Discussed importance of regular tooth brushing, flossing, and dental visits.  -- Suggested having eyes and vision checked if needed or past due.  --Immunizations reviewed.  --Discussed benefits of screening colonoscopy.Request Jennyfer    I spent 32 minutes caring for Juliet on this date of service. This time includes time spent by me in the following activities:preparing for the visit, reviewing tests, obtaining and/or reviewing a separately obtained history, performing a medically appropriate examination and/or evaluation , counseling and educating the patient/family/caregiver, ordering medications, tests, or procedures, documenting information in the medical record, and independently interpreting results and communicating that information with the patient/family/caregiver  Follow Up   Return in about 4 weeks (around 5/15/2024), or labs today/weight.  Patient was given instructions and counseling regarding her condition or for health maintenance advice. Please see specific information pulled into the AVS if appropriate.     GISELA Browning [facility]  [address] [place] [address]  Dept: [phone]  Dept Fax: [phone]  Loc: [phone]  Loc Fax: [phone]        Answers submitted by the patient for this visit:  Primary Reason for Visit (Submitted on 4/16/2024)  What is the primary reason for your visit?: High Blood Pressure

## 2024-04-18 LAB
ALBUMIN SERPL-MCNC: 4.3 G/DL (ref 3.5–5.2)
ALBUMIN/GLOB SERPL: 1.4 G/DL
ALP SERPL-CCNC: 103 U/L (ref 39–117)
ALT SERPL-CCNC: 15 U/L (ref 1–33)
AST SERPL-CCNC: 12 U/L (ref 1–32)
BILIRUB SERPL-MCNC: 0.3 MG/DL (ref 0–1.2)
BUN SERPL-MCNC: 10 MG/DL (ref 6–20)
BUN/CREAT SERPL: 10.5 (ref 7–25)
CALCIUM SERPL-MCNC: 10 MG/DL (ref 8.6–10.5)
CHLORIDE SERPL-SCNC: 102 MMOL/L (ref 98–107)
CHOLEST SERPL-MCNC: 226 MG/DL (ref 0–200)
CO2 SERPL-SCNC: 27.7 MMOL/L (ref 22–29)
CREAT SERPL-MCNC: 0.95 MG/DL (ref 0.57–1)
EGFRCR SERPLBLD CKD-EPI 2021: 71.8 ML/MIN/1.73
ERYTHROCYTE [DISTWIDTH] IN BLOOD BY AUTOMATED COUNT: 13.2 % (ref 12.3–15.4)
GLOBULIN SER CALC-MCNC: 3 GM/DL
GLUCOSE SERPL-MCNC: 102 MG/DL (ref 65–99)
HCT VFR BLD AUTO: 40.2 % (ref 34–46.6)
HDLC SERPL-MCNC: 56 MG/DL (ref 40–60)
HGB BLD-MCNC: 13.4 G/DL (ref 12–15.9)
LDLC SERPL CALC-MCNC: 146 MG/DL (ref 0–100)
LDLC/HDLC SERPL: 2.56 {RATIO}
MCH RBC QN AUTO: 31.7 PG (ref 26.6–33)
MCHC RBC AUTO-ENTMCNC: 33.3 G/DL (ref 31.5–35.7)
MCV RBC AUTO: 95 FL (ref 79–97)
PLATELET # BLD AUTO: 462 10*3/MM3 (ref 140–450)
POTASSIUM SERPL-SCNC: 4.3 MMOL/L (ref 3.5–5.2)
PROT SERPL-MCNC: 7.3 G/DL (ref 6–8.5)
RBC # BLD AUTO: 4.23 10*6/MM3 (ref 3.77–5.28)
SODIUM SERPL-SCNC: 141 MMOL/L (ref 136–145)
TRIGL SERPL-MCNC: 134 MG/DL (ref 0–150)
TSH SERPL DL<=0.005 MIU/L-ACNC: 2.43 UIU/ML (ref 0.27–4.2)
VLDLC SERPL CALC-MCNC: 24 MG/DL (ref 5–40)
WBC # BLD AUTO: 11.28 10*3/MM3 (ref 3.4–10.8)

## 2024-05-16 ENCOUNTER — OFFICE VISIT (OUTPATIENT)
Dept: FAMILY MEDICINE CLINIC | Facility: CLINIC | Age: 54
End: 2024-05-16
Payer: COMMERCIAL

## 2024-05-16 VITALS
WEIGHT: 252 LBS | OXYGEN SATURATION: 95 % | DIASTOLIC BLOOD PRESSURE: 80 MMHG | HEIGHT: 63 IN | SYSTOLIC BLOOD PRESSURE: 126 MMHG | TEMPERATURE: 97.5 F | HEART RATE: 100 BPM | BODY MASS INDEX: 44.65 KG/M2

## 2024-05-16 DIAGNOSIS — E66.01 MORBID OBESITY WITH BMI OF 40.0-44.9, ADULT: Primary | ICD-10-CM

## 2024-05-16 DIAGNOSIS — R11.0 NAUSEA: ICD-10-CM

## 2024-05-16 DIAGNOSIS — I10 ESSENTIAL HYPERTENSION: ICD-10-CM

## 2024-05-16 DIAGNOSIS — Z76.89 ENCOUNTER FOR WEIGHT MANAGEMENT: ICD-10-CM

## 2024-05-16 DIAGNOSIS — E78.00 HYPERCHOLESTEROLEMIA: ICD-10-CM

## 2024-05-16 PROCEDURE — 99214 OFFICE O/P EST MOD 30 MIN: CPT | Performed by: PHYSICIAN ASSISTANT

## 2024-05-16 RX ORDER — BUDESONIDE AND FORMOTEROL FUMARATE DIHYDRATE 160; 4.5 UG/1; UG/1
AEROSOL RESPIRATORY (INHALATION)
COMMUNITY
Start: 2024-04-17

## 2024-05-16 RX ORDER — ONDANSETRON 4 MG/1
4 TABLET, ORALLY DISINTEGRATING ORAL EVERY 8 HOURS PRN
Qty: 30 TABLET | Refills: 0 | Status: SHIPPED | OUTPATIENT
Start: 2024-05-16

## 2024-05-16 NOTE — PROGRESS NOTES
"Chief Complaint  Weight Check    Subjective          Juliet Fowler presents to [facility]  History of Present Illness  Juliet, \"Scarlet\", is a 54-year-old female who presents for obesity and weight loss management.  States she has started the Zepbound for weight loss.  States she has had slight nausea since starting the medication but would like to continue taking.  States that is starting to help curb her appetite.  She has been cutting back on portion sizes and carbohydrates.  States she has been alternating sites for the injection of the weight loss medication.  Denied any current chest pain, shortness of air, cough, wheezing, or abdominal pain.  Bowel movements have been regular without dark black tarry stools.  Scarlet states she has noticed weight loss at home.     Objective   Vital Signs:   /80 (BP Location: Left arm, Patient Position: Sitting, Cuff Size: Adult)   Pulse 100   Temp 97.5 °F (36.4 °C)   Ht 160 cm (63\")   Wt 114 kg (252 lb)   SpO2 95%   BMI 44.64 kg/m²     Physical Exam  Vitals and nursing note reviewed.   Constitutional:       Appearance: Normal appearance. She is well-developed and well-groomed. She is morbidly obese.   HENT:      Head: Normocephalic and atraumatic.   Neck:      Thyroid: No thyroid mass, thyromegaly or thyroid tenderness.      Vascular: No carotid bruit.      Trachea: Trachea and phonation normal. No tracheal tenderness.   Cardiovascular:      Rate and Rhythm: Normal rate and regular rhythm.      Pulses: Normal pulses.      Heart sounds: Normal heart sounds, S1 normal and S2 normal. No murmur heard.  Pulmonary:      Effort: Pulmonary effort is normal.      Breath sounds: Normal breath sounds and air entry.   Abdominal:      General: Bowel sounds are normal.      Palpations: Abdomen is soft. There is no hepatomegaly.      Tenderness: There is no abdominal tenderness. There is no right CVA tenderness, left CVA tenderness, guarding or rebound. " Negative signs include Mahmood's sign, Rovsing's sign, McBurney's sign, psoas sign and obturator sign.   Musculoskeletal:      Cervical back: Neck supple.      Right lower leg: No edema.      Left lower leg: No edema.   Skin:     General: Skin is warm and dry.      Capillary Refill: Capillary refill takes less than 2 seconds.   Neurological:      Mental Status: She is alert and oriented to person, place, and time.   Psychiatric:         Attention and Perception: Attention and perception normal.         Mood and Affect: Mood and affect normal.         Speech: Speech normal.         Behavior: Behavior normal. Behavior is cooperative.         Thought Content: Thought content normal.         Cognition and Memory: Cognition and memory normal.         Judgment: Judgment normal.        Result Review :                 Assessment and Plan    Diagnoses and all orders for this visit:    1. Morbid obesity with BMI of 40.0-44.9, adult (Primary)  -     Tirzepatide-Weight Management (ZEPBOUND) 5 MG/0.5ML solution auto-injector; Inject 0.5 mL under the skin into the appropriate area as directed 1 (One) Time Per Week. BMI44.7  Dispense: 2 mL; Refill: 0    2. Encounter for weight management  -     Tirzepatide-Weight Management (ZEPBOUND) 5 MG/0.5ML solution auto-injector; Inject 0.5 mL under the skin into the appropriate area as directed 1 (One) Time Per Week. BMI44.7  Dispense: 2 mL; Refill: 0    3. Hypercholesterolemia  -     Tirzepatide-Weight Management (ZEPBOUND) 5 MG/0.5ML solution auto-injector; Inject 0.5 mL under the skin into the appropriate area as directed 1 (One) Time Per Week. BMI44.7  Dispense: 2 mL; Refill: 0    4. Essential hypertension  -     Tirzepatide-Weight Management (ZEPBOUND) 5 MG/0.5ML solution auto-injector; Inject 0.5 mL under the skin into the appropriate area as directed 1 (One) Time Per Week. BMI44.7  Dispense: 2 mL; Refill: 0    5. Nausea  -     ondansetron ODT (ZOFRAN-ODT) 4 MG disintegrating tablet;  "Place 1 tablet on the tongue Every 8 (Eight) Hours As Needed for Nausea or Vomiting.  Dispense: 30 tablet; Refill: 0    Juliet, \"Scarlet\", was seen in office today for chronic and stable obesity with hypertension, hyperlipidemia with some nausea.  I will increase her Zepbound medication to 5 mg weekly.  She will continue alternating injection sites.  I have sent to pharmacy Zofran to take as needed for any nausea.  She will continue making healthier choices in diet and decreasing portion sizes.  Stressed the importance of increasing physical activity.  Return to office in 1 month for reevaluation.    I spent 20 minutes caring for Juliet on this date of service. This time includes time spent by me in the following activities:preparing for the visit, obtaining and/or reviewing a separately obtained history, performing a medically appropriate examination and/or evaluation , counseling and educating the patient/family/caregiver, ordering medications, tests, or procedures, and documenting information in the medical record  Follow Up   Return in about 4 weeks (around 6/13/2024), or weight with Tiff/female.  Patient was given instructions and counseling regarding her condition or for health maintenance advice. Please see specific information pulled into the AVS if appropriate.     GISELA Browning [facility]  [address] [facility] [address]  Dept: [phone]  Dept Fax: [phone]  Loc: [phone]  Loc Fax: [phone]        "

## 2024-05-23 DIAGNOSIS — I10 ESSENTIAL HYPERTENSION: ICD-10-CM

## 2024-05-23 DIAGNOSIS — E66.01 MORBID OBESITY WITH BMI OF 40.0-44.9, ADULT: ICD-10-CM

## 2024-05-23 DIAGNOSIS — E78.00 HYPERCHOLESTEROLEMIA: ICD-10-CM

## 2024-05-23 DIAGNOSIS — Z76.89 ENCOUNTER FOR WEIGHT MANAGEMENT: ICD-10-CM

## 2024-06-14 DIAGNOSIS — E66.01 MORBID OBESITY WITH BMI OF 40.0-44.9, ADULT: ICD-10-CM

## 2024-06-14 DIAGNOSIS — Z76.89 ENCOUNTER FOR WEIGHT MANAGEMENT: ICD-10-CM

## 2024-06-14 DIAGNOSIS — I10 ESSENTIAL HYPERTENSION: ICD-10-CM

## 2024-06-14 DIAGNOSIS — E78.00 HYPERCHOLESTEROLEMIA: ICD-10-CM

## 2024-08-16 ENCOUNTER — TRANSCRIBE ORDERS (OUTPATIENT)
Dept: ADMINISTRATIVE | Facility: HOSPITAL | Age: 54
End: 2024-08-16
Payer: COMMERCIAL

## 2024-08-16 DIAGNOSIS — I10 ESSENTIAL HYPERTENSION: ICD-10-CM

## 2024-08-16 DIAGNOSIS — R12 HEARTBURN: ICD-10-CM

## 2024-08-16 DIAGNOSIS — Z13.9 SCREENING DUE: Primary | ICD-10-CM

## 2024-08-16 RX ORDER — VALSARTAN AND HYDROCHLOROTHIAZIDE 80; 12.5 MG/1; MG/1
1 TABLET, FILM COATED ORAL DAILY
Qty: 90 TABLET | Refills: 3 | OUTPATIENT
Start: 2024-08-16

## 2024-08-16 RX ORDER — OMEPRAZOLE 40 MG/1
40 CAPSULE, DELAYED RELEASE ORAL DAILY
Qty: 90 CAPSULE | Refills: 0 | Status: SHIPPED | OUTPATIENT
Start: 2024-08-16

## 2024-08-16 NOTE — TELEPHONE ENCOUNTER
"DELETE AFTER REVIEWING: Send the encounter HIGH priority, If patient has less than a 3 day supply. If the patient will run out of medication over the weekend add that information to the additional details line. Send to the appropriate pool. Check your call action grid or workflows.    Caller: Juliet Fowler \"Scarlet\"    Relationship: Self    Best call back number: [phone]     Requested Prescriptions:   Requested Prescriptions     Pending Prescriptions Disp Refills    valsartan-hydrochlorothiazide (DIOVAN-HCT) 80-12.5 MG per tablet 90 tablet 3     Sig: Take 1 tablet by mouth Daily.    omeprazole (priLOSEC) 40 MG capsule 90 capsule 0     Sig: Take 1 capsule by mouth Daily.        Pharmacy where request should be sent: [facility] [id number] [address]  AT [address] [phone] PH - [phone]      Last office visit with prescribing clinician: 2/9/2024   Last telemedicine visit with prescribing clinician: Visit date not found   Next office visit with prescribing clinician: 12/27/2024     Additional details provided by patient: PLEASE SEND 90 DAY SUPPLY WITH REFILLS. PATIENT NEEDS REFILL TO GET HER THROUGH UNTIL APPT ON DEC 27      Does the patient have less than a 3 day supply:  [x] Yes  [] No BEEN OUT FOR A WEEK    Would you like a call back once the refill request has been completed: [] Yes [] No    If the office needs to give you a call back, can they leave a voicemail: [] Yes [] No    Albert Del Rio   08/16/24 12:10 EDT         DELETE AFTER READING TO PATIENT: “Thank you for sharing this information with me. I will send a message to the clinical team. Please allow 48 hours for the clinical staff to follow up on this request.”   "

## 2024-09-27 ENCOUNTER — HOSPITAL ENCOUNTER (OUTPATIENT)
Dept: MAMMOGRAPHY | Facility: HOSPITAL | Age: 54
Discharge: HOME | End: 2024-09-27
Admitting: NURSE PRACTITIONER
Payer: COMMERCIAL

## 2024-09-27 DIAGNOSIS — Z13.9 SCREENING DUE: ICD-10-CM

## 2024-09-27 PROCEDURE — 77067 SCR MAMMO BI INCL CAD: CPT

## 2024-09-27 PROCEDURE — 77063 BREAST TOMOSYNTHESIS BI: CPT

## 2024-10-17 ENCOUNTER — OFFICE VISIT (OUTPATIENT)
Dept: FAMILY MEDICINE CLINIC | Facility: CLINIC | Age: 54
End: 2024-10-17
Payer: COMMERCIAL

## 2024-10-17 VITALS
HEIGHT: 63 IN | TEMPERATURE: 97.1 F | OXYGEN SATURATION: 98 % | DIASTOLIC BLOOD PRESSURE: 100 MMHG | SYSTOLIC BLOOD PRESSURE: 170 MMHG | HEART RATE: 88 BPM | WEIGHT: 251.9 LBS | BODY MASS INDEX: 44.63 KG/M2

## 2024-10-17 DIAGNOSIS — R00.2 HEART PALPITATIONS: ICD-10-CM

## 2024-10-17 DIAGNOSIS — Z23 INFLUENZA VACCINE NEEDED: ICD-10-CM

## 2024-10-17 DIAGNOSIS — R12 HEARTBURN: ICD-10-CM

## 2024-10-17 DIAGNOSIS — E78.00 HYPERCHOLESTEROLEMIA: ICD-10-CM

## 2024-10-17 DIAGNOSIS — Z76.89 ENCOUNTER TO ESTABLISH CARE: Primary | ICD-10-CM

## 2024-10-17 DIAGNOSIS — E66.01 MORBID OBESITY WITH BMI OF 40.0-44.9, ADULT: ICD-10-CM

## 2024-10-17 DIAGNOSIS — I10 ESSENTIAL HYPERTENSION: ICD-10-CM

## 2024-10-17 DIAGNOSIS — J45.20 MILD INTERMITTENT ASTHMA WITHOUT COMPLICATION: ICD-10-CM

## 2024-10-17 PROCEDURE — 93000 ELECTROCARDIOGRAM COMPLETE: CPT

## 2024-10-17 PROCEDURE — 99214 OFFICE O/P EST MOD 30 MIN: CPT

## 2024-10-17 PROCEDURE — 90471 IMMUNIZATION ADMIN: CPT

## 2024-10-17 PROCEDURE — 90656 IIV3 VACC NO PRSV 0.5 ML IM: CPT

## 2024-10-17 RX ORDER — ESTRADIOL AND NORETHINDRONE ACETATE 1; .5 MG/1; MG/1
1 TABLET ORAL DAILY
COMMUNITY
Start: 2024-08-16

## 2024-10-17 RX ORDER — BUDESONIDE AND FORMOTEROL FUMARATE DIHYDRATE 160; 4.5 UG/1; UG/1
2 AEROSOL RESPIRATORY (INHALATION)
Qty: 10.2 G | Refills: 11 | Status: SHIPPED | OUTPATIENT
Start: 2024-10-17

## 2024-10-17 RX ORDER — OMEPRAZOLE 40 MG/1
40 CAPSULE, DELAYED RELEASE ORAL DAILY
Qty: 90 CAPSULE | Refills: 0 | Status: SHIPPED | OUTPATIENT
Start: 2024-10-17

## 2024-10-17 RX ORDER — VALSARTAN AND HYDROCHLOROTHIAZIDE 160; 25 MG/1; MG/1
1 TABLET ORAL DAILY
Qty: 90 TABLET | Refills: 1 | Status: SHIPPED | OUTPATIENT
Start: 2024-10-17

## 2024-10-17 RX ORDER — ALBUTEROL SULFATE 90 UG/1
1 AEROSOL, METERED RESPIRATORY (INHALATION) EVERY 6 HOURS PRN
Qty: 18 G | Refills: 6 | Status: SHIPPED | OUTPATIENT
Start: 2024-10-17

## 2024-10-17 NOTE — PROGRESS NOTES
"Chief Complaint   Patient presents with    Establish Care    Hypertension       Subjective      Patient ID: Juliet Moore" is a 54 y.o. female.     Chief Complaint   Patient presents with    Establish Care    Hypertension        History of Present Illness   Juliet Fowler was a patient of Heidi Moe PA-C who is no longer with our practice.  I will be taking over this patient's primary care.  This is the first time patient is seeing me. She is here to establish care and for chronic care follow-up. PMH significant for hypertension, hyperlipidemia, and obesity. Last physical 4/24. Cologuard up to date Cologuard - Stool, Per Rectum (05/01/2024 08:30). Paps done by [facility], last done 8/20/24. Last mammogram 9/24 BIRADS 1.    HTN - currently taking valsartan-HCTZ with good compliance and no adverse effects. Does not check home BP. Denies chest pain, flushing, dizziness. Endorses increased shortness of breath and heart palpitations but has also has asthma and has been more symptomatic with the recent weather change. Endorses leg swelling later in the day.    SANCHEZ with CPAP - currently wears her CPAP daily with good compliance. Had a follow-up with sleep medicine 1 month ago and settings and symptom control were appropriate.    HLD - Last fasting TC in April 226 with . Not currently taking medication for this.     Obesity - was taking Zepbound but stopped in June due to side effects. Weight has been stable since last visit with no gain but also no loss. BMI is 44.62.    Asthma - has Symbicort but only uses with increased symptoms in the fall, has not started using yet this season. Has used albuterol a few times recently.    Heartburn - currently taking omeprazole 40mg daily with good control of symptoms.         The following portions of the patient's history were reviewed and updated as appropriate: allergies, current medications, past family history, past medical history, past social history, past surgical " "history, and problem list.      Current Outpatient Medications:     Azelastine HCl 137 MCG/SPRAY solution, , Disp: , Rfl:     cetirizine (zyrTEC) 10 MG tablet, Take 1 tablet by mouth Daily., Disp: , Rfl:     estradiol-norethindrone (ACTIVELLA) 1-0.5 MG per tablet, Take 1 tablet by mouth Daily., Disp: , Rfl:     fluticasone (FLONASE) 50 MCG/ACT nasal spray, , Disp: , Rfl:     omeprazole (priLOSEC) 40 MG capsule, Take 1 capsule by mouth Daily., Disp: 90 capsule, Rfl: 0    ProAir  (90 Base) MCG/ACT inhaler, Inhale 1 puff Every 6 (Six) Hours As Needed for Wheezing., Disp: 18 g, Rfl: 6    Symbicort 160-4.5 MCG/ACT inhaler, Inhale 2 puffs 2 (Two) Times a Day. Indications: Asthma, Disp: 10.2 g, Rfl: 11    valsartan-hydrochlorothiazide (Diovan HCT) 160-25 MG per tablet, Take 1 tablet by mouth Daily. Indications: High Blood Pressure, Disp: 90 tablet, Rfl: 1              Objective    Initial /100 left arm sitting  /100 (BP Location: Left arm, Patient Position: Sitting, Cuff Size: Large Adult)   Pulse 88   Temp 97.1 °F (36.2 °C) (Infrared)   Ht 160 cm (63\")   Wt 114 kg (251 lb 14.4 oz)   SpO2 98%   BMI 44.62 kg/m²      Body mass index is 44.62 kg/m².           Physical Exam  Vitals reviewed.   Constitutional:       General: She is not in acute distress.     Appearance: Normal appearance. She is obese.   HENT:      Head: Normocephalic and atraumatic.      Right Ear: Tympanic membrane normal.      Left Ear: Tympanic membrane normal.      Mouth/Throat:      Mouth: Mucous membranes are moist.      Pharynx: Oropharynx is clear. No oropharyngeal exudate.   Eyes:      Conjunctiva/sclera: Conjunctivae normal.      Pupils: Pupils are equal, round, and reactive to light.   Neck:      Thyroid: No thyromegaly.      Vascular: No carotid bruit.   Cardiovascular:      Rate and Rhythm: Normal rate and regular rhythm.      Pulses: Normal pulses.      Heart sounds: Normal heart sounds. No murmur heard.     No friction " rub.   Pulmonary:      Effort: Pulmonary effort is normal. No respiratory distress.      Breath sounds: Normal breath sounds. No wheezing.   Abdominal:      General: Abdomen is protuberant. Bowel sounds are normal.      Palpations: Abdomen is soft. There is no mass.      Tenderness: There is no abdominal tenderness.      Hernia: No hernia is present.   Musculoskeletal:      Cervical back: Neck supple.      Right lower leg: No edema.      Left lower leg: No edema.   Lymphadenopathy:      Cervical: No cervical adenopathy.   Skin:     General: Skin is warm and dry.      Capillary Refill: Capillary refill takes less than 2 seconds.   Neurological:      General: No focal deficit present.      Mental Status: She is alert and oriented to person, place, and time.   Psychiatric:         Mood and Affect: Mood normal.         Behavior: Behavior normal.                Assessment & Plan       1. Encounter to establish care  Reviewed all pertinent medical, family, surgical, and social history today.  Will check fasting labs today and await results for further recommendation. Flu vaccine given today. All other screenings up to date.    2. Mild intermittent asthma without complication  Chronic, controlled. Refills sent for Symbicort and albuterol.  - Symbicort 160-4.5 MCG/ACT inhaler; Inhale 2 puffs 2 (Two) Times a Day. Indications: Asthma  Dispense: 10.2 g; Refill: 11  - ProAir  (90 Base) MCG/ACT inhaler; Inhale 1 puff Every 6 (Six) Hours As Needed for Wheezing.  Dispense: 18 g; Refill: 6  - Lipid Panel With / Chol / HDL Ratio  - CBC (No Diff)  - Comprehensive Metabolic Panel  - TSH Rfx On Abnormal To Free T4  - Hemoglobin A1c    3. Heartburn  Chronic, controlled. Refills sent for omeprazole.  - omeprazole (priLOSEC) 40 MG capsule; Take 1 capsule by mouth Daily.  Dispense: 90 capsule; Refill: 0    4. Influenza vaccine needed  VIS given  - Fluzone >6mos ([id number])    5. Essential hypertension  Chronic, uncontrolled. Diovan  dose increased to 160-25mg. Instructions sent for proper home BP technique. Check home BP and keep a log. Call if persistently >140/90. Will follow-up on BP in 2 weeks. Will check fasting labs today and await results for further recommendation. EKG in office normal today.  - valsartan-hydrochlorothiazide (Diovan HCT) 160-25 MG per tablet; Take 1 tablet by mouth Daily. Indications: High Blood Pressure  Dispense: 90 tablet; Refill: 1  - Lipid Panel With / Chol / HDL Ratio  - CBC (No Diff)  - Comprehensive Metabolic Panel  - TSH Rfx On Abnormal To Free T4  - Hemoglobin A1c    6. Morbid obesity with BMI of 40.0-44.9, adult  Chronic, uncontrolled. Diet and exercise counseling given. Patient does not want to restart on Zepbound as she didn't tolerate GI side effects. Would like to start going to the gym again. Will check fasting labs today and await results for further recommendation.  - Lipid Panel With / Chol / HDL Ratio  - CBC (No Diff)  - Comprehensive Metabolic Panel  - TSH Rfx On Abnormal To Free T4  - Hemoglobin A1c    7. Heart palpitations  Acute on chronic. EKG stable today with no evidence of ischemia. Will improve hypertension control and reevaluate symptoms.  - ECG 12 Lead    8. Hypercholesterolemia  Chronic, (?) control. Will check fasting labs today and await results for further recommendation.         Return in about 6 months (around 4/17/2025) for labs today, Annual physical with fasting labs in 6 months.       CLINT Smith           Note to patient: The 21st Century Cures Act makes medical notes like these available to patients in the interest of transparency. However, be advised this is a medical document. It is intended as peer to peer communication. It is written in medical language and may contain abbreviations or verbiage that are unfamiliar. It may appear blunt or direct. Medical documents are intended to carry relevant information, facts as evident, and the clinical opinion of the  practitioner.      Procedure     ECG 12 Lead    Date/Time: 10/17/2024 9:05 AM  Performed by: Susanna Mishra APRN    Authorized by: Susanna Mishra APRN  Comparison: compared with previous ECG from 4/17/2024  Comparison to previous ECG: Previous ECG with NSR, today's ECG with sinus rhythm and nonspecific t-wave abnormality  Rhythm: sinus rhythm  Rate: normal  Conduction: conduction normal  ST Segments: ST segments normal  QRS axis: normal  Other findings: non-specific ST-T wave changes    Clinical impression: normal ECG  Comments: Sinus rhythm with non-specific T-wave abnormality, stable

## 2024-10-17 NOTE — PATIENT INSTRUCTIONS
Instructions for Proper Home Blood Pressure Monitoring     Prior to Measurement:          Empty your bladder and avoid smoking, caffeine, or exercise within 30 min before BP measurements.          Ensure at least 5 minutes of quiet rest before BP measurements.          Self-measure your blood pressure 30 to 60 minutes before taking BP medications.      Sit correctly:          Sit with back straight and supported (on a straight-backed dining chair, for example, rather than a sofa).          Sit with feet flat on the floor and legs uncrossed.          Keep arm supported on a flat surface (such as a table), with the upper arm at heart level.          Bottom of the cuff should be placed directly above the antecubital fossa (bend of the elbow).      Take multiple readings:          Take at least 2 readings 1 minute apart in morning before taking medications and in evening before supper.          Ideally, you should measure and record BP daily          At least weekly BP readings beginning 2 weeks after a change in the therapy and during the week before a clinic visit.      Record all readings accurately:          Monitors with built-in memory should be brought to all clinic appointments.          BP should be based on an average of readings on >=2 occasions for clinical decision making.         Arm cuffs are more accurate than wrist cuffs.

## 2024-10-18 LAB
ALBUMIN SERPL-MCNC: 4.1 G/DL (ref 3.5–5.2)
ALBUMIN/GLOB SERPL: 1.3 G/DL
ALP SERPL-CCNC: 95 U/L (ref 39–117)
ALT SERPL-CCNC: 18 U/L (ref 1–33)
AST SERPL-CCNC: 24 U/L (ref 1–32)
BILIRUB SERPL-MCNC: 0.3 MG/DL (ref 0–1.2)
BUN SERPL-MCNC: 10 MG/DL (ref 6–20)
BUN/CREAT SERPL: 13.2 (ref 7–25)
CALCIUM SERPL-MCNC: 9.5 MG/DL (ref 8.6–10.5)
CHLORIDE SERPL-SCNC: 102 MMOL/L (ref 98–107)
CHOLEST SERPL-MCNC: 216 MG/DL (ref 0–200)
CHOLEST/HDLC SERPL: 6 {RATIO}
CO2 SERPL-SCNC: 25.6 MMOL/L (ref 22–29)
CREAT SERPL-MCNC: 0.76 MG/DL (ref 0.57–1)
EGFRCR SERPLBLD CKD-EPI 2021: 93.8 ML/MIN/1.73
ERYTHROCYTE [DISTWIDTH] IN BLOOD BY AUTOMATED COUNT: 12.7 % (ref 12.3–15.4)
GLOBULIN SER CALC-MCNC: 3.1 GM/DL
GLUCOSE SERPL-MCNC: 110 MG/DL (ref 65–99)
HBA1C MFR BLD: 6.3 % (ref 4.8–5.6)
HCT VFR BLD AUTO: 42.3 % (ref 34–46.6)
HDLC SERPL-MCNC: 36 MG/DL (ref 40–60)
HGB BLD-MCNC: 13.9 G/DL (ref 12–15.9)
LDLC SERPL CALC-MCNC: 148 MG/DL (ref 0–100)
MCH RBC QN AUTO: 31.3 PG (ref 26.6–33)
MCHC RBC AUTO-ENTMCNC: 32.9 G/DL (ref 31.5–35.7)
MCV RBC AUTO: 95.3 FL (ref 79–97)
PLATELET # BLD AUTO: 435 10*3/MM3 (ref 140–450)
POTASSIUM SERPL-SCNC: 4.2 MMOL/L (ref 3.5–5.2)
PROT SERPL-MCNC: 7.2 G/DL (ref 6–8.5)
RBC # BLD AUTO: 4.44 10*6/MM3 (ref 3.77–5.28)
SODIUM SERPL-SCNC: 141 MMOL/L (ref 136–145)
TRIGL SERPL-MCNC: 178 MG/DL (ref 0–150)
TSH SERPL DL<=0.005 MIU/L-ACNC: 1.9 UIU/ML (ref 0.27–4.2)
VLDLC SERPL CALC-MCNC: 32 MG/DL (ref 5–40)
WBC # BLD AUTO: 8.37 10*3/MM3 (ref 3.4–10.8)

## 2024-10-25 DIAGNOSIS — J45.20 MILD INTERMITTENT ASTHMA WITHOUT COMPLICATION: Primary | ICD-10-CM

## 2025-02-10 ENCOUNTER — TELEPHONE (OUTPATIENT)
Dept: FAMILY MEDICINE CLINIC | Facility: CLINIC | Age: 55
End: 2025-02-10
Payer: COMMERCIAL

## 2025-02-10 DIAGNOSIS — J45.20 MILD INTERMITTENT ASTHMA WITHOUT COMPLICATION: Primary | ICD-10-CM

## 2025-02-10 RX ORDER — BUDESONIDE AND FORMOTEROL FUMARATE DIHYDRATE 160; 4.5 UG/1; UG/1
2 AEROSOL RESPIRATORY (INHALATION)
Qty: 10.2 G | Refills: 12 | Status: SHIPPED | OUTPATIENT
Start: 2025-02-10

## 2025-02-10 NOTE — TELEPHONE ENCOUNTER
Spoke with patient. Patient understands and has no further questions.      ----- Message from Catalina CAICEDO sent at 2/10/2025 10:18 AM EST -----  Regarding: FW: symbicort alternative    ----- Message -----  From: Susanna Mishra APRN  Sent: 2/10/2025   9:41 AM EST  To: Catalina nAg MA  Subject: symbicort alternative                            We please let Scarlet know that I have called in the generic budesonide formoterol for her which is the generic equivalent of her Symbicort.

## 2025-02-14 DIAGNOSIS — R12 HEARTBURN: ICD-10-CM

## 2025-02-14 RX ORDER — OMEPRAZOLE 40 MG/1
40 CAPSULE, DELAYED RELEASE ORAL DAILY
Qty: 90 CAPSULE | Refills: 3 | Status: SHIPPED | OUTPATIENT
Start: 2025-02-14

## 2025-04-01 DIAGNOSIS — I10 ESSENTIAL HYPERTENSION: ICD-10-CM

## 2025-04-14 DIAGNOSIS — R73.03 PREDIABETES: ICD-10-CM

## 2025-04-14 DIAGNOSIS — E78.00 HYPERCHOLESTEROLEMIA: ICD-10-CM

## 2025-04-14 DIAGNOSIS — I10 ESSENTIAL HYPERTENSION: Primary | ICD-10-CM

## 2025-04-15 RX ORDER — VALSARTAN AND HYDROCHLOROTHIAZIDE 160; 25 MG/1; MG/1
1 TABLET ORAL DAILY
Qty: 90 TABLET | Refills: 1 | Status: SHIPPED | OUTPATIENT
Start: 2025-04-15

## 2025-04-30 ENCOUNTER — OFFICE VISIT (OUTPATIENT)
Dept: FAMILY MEDICINE CLINIC | Facility: CLINIC | Age: 55
End: 2025-04-30
Payer: COMMERCIAL

## 2025-04-30 VITALS
DIASTOLIC BLOOD PRESSURE: 78 MMHG | TEMPERATURE: 97.3 F | SYSTOLIC BLOOD PRESSURE: 126 MMHG | OXYGEN SATURATION: 96 % | BODY MASS INDEX: 45.02 KG/M2 | HEART RATE: 85 BPM | HEIGHT: 63 IN | WEIGHT: 254.1 LBS

## 2025-04-30 DIAGNOSIS — E78.00 HYPERCHOLESTEROLEMIA: ICD-10-CM

## 2025-04-30 DIAGNOSIS — R22.31 MASS OF WRIST, RIGHT: ICD-10-CM

## 2025-04-30 DIAGNOSIS — Z00.00 ANNUAL PHYSICAL EXAM: Primary | ICD-10-CM

## 2025-04-30 DIAGNOSIS — E11.65 TYPE 2 DIABETES MELLITUS WITH HYPERGLYCEMIA, WITHOUT LONG-TERM CURRENT USE OF INSULIN: ICD-10-CM

## 2025-04-30 DIAGNOSIS — G56.01 CARPAL TUNNEL SYNDROME ON RIGHT: ICD-10-CM

## 2025-04-30 DIAGNOSIS — E66.01 CLASS 3 SEVERE OBESITY DUE TO EXCESS CALORIES WITH SERIOUS COMORBIDITY AND BODY MASS INDEX (BMI) OF 45.0 TO 49.9 IN ADULT: ICD-10-CM

## 2025-04-30 DIAGNOSIS — E66.813 CLASS 3 SEVERE OBESITY DUE TO EXCESS CALORIES WITH SERIOUS COMORBIDITY AND BODY MASS INDEX (BMI) OF 45.0 TO 49.9 IN ADULT: ICD-10-CM

## 2025-04-30 DIAGNOSIS — R79.89 ELEVATED PLATELET COUNT: ICD-10-CM

## 2025-04-30 DIAGNOSIS — B35.3 TINEA PEDIS OF BOTH FEET: ICD-10-CM

## 2025-04-30 RX ORDER — METFORMIN HYDROCHLORIDE 500 MG/1
500 TABLET, EXTENDED RELEASE ORAL
Qty: 30 TABLET | Refills: 5 | Status: SHIPPED | OUTPATIENT
Start: 2025-04-30

## 2025-04-30 RX ORDER — CLOTRIMAZOLE AND BETAMETHASONE DIPROPIONATE 10; .64 MG/G; MG/G
1 CREAM TOPICAL 2 TIMES DAILY
Qty: 15 G | Refills: 1 | Status: SHIPPED | OUTPATIENT
Start: 2025-04-30

## 2025-04-30 NOTE — PROGRESS NOTES
Patient or patient representative verbalized consent for the use of Ambient Listening during the visit with  CLINT Smith for chart documentation. 4/30/2025  08:34 EDT    Chief Complaint   Patient presents with    Annual Exam       Patient Care Team:  Susanna Mishra APRN as PCP - General (Family Medicine)  Sheryl De Oliveira MD (Obstetrics and Gynecology)  Derik Govea MD as Consulting Physician (Pulmonary Disease)  Isabel Ba APRN as Nurse Practitioner (Orthopedic Surgery)  Flor Mir APRN (Inactive) as Nurse Practitioner (Breast Surgery)  Malik Metcalf MD as Consulting Physician (Allergy and Immunology)    Subjective   Juliet Fowler is a 55 y.o. female and is here for a yearly physical exam. The patient reports problems - mass on wrist .    History of Present Illness  The patient presents for a physical exam.  Chronic care management includes hypertension, hyperlipidemia, prediabetes, and obesity. No swelling in the ankles or abnormalities in bowel movements, such as blood or mucus in the stool, are reported.  She has gained 3 pounds since her last visit.    The chief complaint is intermittent tingling sensations in the right wrist, accompanied by a palpable lump on the wrist. Symptoms were first noticed approximately one month ago. Initially, constant numbness was experienced, but the intensity has since decreased.  She also notes that the size of the mass on the wrist has decreased but is still causing problems.    Recent lab results indicate a mildly elevated platelet count, persistent mild elevation in glucose levels, and an A1c of 6.4. Kidney function tests show a GFR of 87.7, which is mildly impaired, likely due to chronic hyperglycemia and hypertension. Liver function and electrolytes are normal. Cholesterol levels have improved, with total cholesterol decreasing from 216 to 197 and LDL from 148 to 136. Thyroid function is normal. Blood pressure is reported  to be within a normal range.    Post menopausal -reports that has not been officially 1 year since her last menstrual period.  She is not currently supplementing with calcium or vitamin D.    She has not had a dental check-up in the past six months but did have an eye examination within the last year. The most recent Pap smear was conducted in 08/2024 at [facility] and yielded normal results. A mammogram in 09/2024 was classified as BI-RADS 1. No DEXA scan has been performed yet. She does not take any calcium or vitamin D supplements. The last Cologuard test was performed in 05/2024 and returned negative results. Regular exercise is maintained, walking five days a week.    Do you take any herbs or supplements that were not prescribed by a doctor? no. If so, these will be added to active medication list.    Health Habits:  Dental Exam: Not up to date  Eye Exam: Up to date  Diet: nothing specifice  Exercise: 5x/week  Current exercise activities include: walking  Pap: 8/16/24 negative with Ob/gyn  Mammogram: 9/24 BIRADS 1  Dexa: never  Colonoscopy: 5/1/24 cologuard negative    The following portions of the patient's history were reviewed and updated as appropriate: allergies, current medications, past family history, past medical history, past social history, past surgical history, and problem list.    Social and Family and Surgical History reviewed and updated today, see Rooming tab.    Health History, Preventive Measures and Vaccination flow sheets reviewed and updated today.    Patient's current medical chart in Epic; including previous office notes, imaging, labs, specialist's evaluation either in notes or in Media tab reviewed today.    Other pertinent medical information also reviewed thru Care Everywhere function is also reviewed today.    Results  Labs   - Urine protein ratio: 25 (normal: 0-29)   - Platelets: Mildly elevated   - GFR: 87.7   - Glucose: Mildly elevated   - Liver function: Normal   - Electrolytes:  "Normal   - Cholesterol: 197 (previously 216)   - LDL: 136 (previously 148)   - Thyroid function: Normal   - A1c: 6.4    Imaging   - Mammogram: 09/2024, BI-RADS 1    Review of Systems  Review of Systems  Vitals:    04/30/25 0811   BP: 126/78   Pulse: 85   Temp: 97.3 °F (36.3 °C)   TempSrc: Infrared   SpO2: 96%   Weight: 115 kg (254 lb 1.6 oz)   Height: 160 cm (63\")     Wt Readings from Last 3 Encounters:   04/30/25 115 kg (254 lb 1.6 oz)   10/17/24 114 kg (251 lb 14.4 oz)   05/16/24 114 kg (252 lb)       Physical Exam  Neck: Supple, no abnormalities  Respiratory: Clear to auscultation, no wheezing, rales or rhonchi  Cardiovascular: Regular rate and rhythm, no murmurs, rubs, or gallops  Gastrointestinal: Soft, no tenderness, no distention, no masses  Extremities: No edema, no cyanosis  Skin: No abnormalities, no rashes or lesions    Physical Exam  Vitals reviewed.   Constitutional:       General: She is not in acute distress.     Appearance: Normal appearance. She is obese.   HENT:      Head: Normocephalic and atraumatic.      Right Ear: Tympanic membrane normal.      Left Ear: Tympanic membrane normal.      Mouth/Throat:      Mouth: Mucous membranes are moist.      Pharynx: Oropharynx is clear. No oropharyngeal exudate.   Eyes:      Conjunctiva/sclera: Conjunctivae normal.      Pupils: Pupils are equal, round, and reactive to light.   Neck:      Thyroid: No thyromegaly.      Vascular: No carotid bruit or JVD.   Cardiovascular:      Rate and Rhythm: Normal rate and regular rhythm.      Pulses: Normal pulses.           Radial pulses are 2+ on the right side and 2+ on the left side.        Posterior tibial pulses are 2+ on the right side and 2+ on the left side.      Heart sounds: Normal heart sounds. No murmur heard.     No friction rub.   Pulmonary:      Effort: Pulmonary effort is normal. No respiratory distress.      Breath sounds: Normal breath sounds. No wheezing or rales.   Abdominal:      General: Abdomen is " flat. Bowel sounds are normal.      Palpations: Abdomen is soft. There is no mass.      Tenderness: There is no abdominal tenderness.      Hernia: No hernia is present.   Musculoskeletal:      Right wrist: Deformity present.        Arms:       Cervical back: Neck supple.      Right lower leg: No edema.      Left lower leg: No edema.        Feet:       Comments: Approx. 1 cm round raised mass on the volar radial surface of the wrist that is rubbery and fixed with mild tenderness to palpation. Phalen test positive right wrist, Tunel sign negative.   Feet:      Right foot:      Protective Sensation: 10 sites tested.  10 sites sensed.      Skin integrity: Dry skin present. No ulcer or skin breakdown.      Toenail Condition: Right toenails are normal.      Left foot:      Protective Sensation: 10 sites tested.  10 sites sensed.      Skin integrity: Dry skin present. No ulcer or skin breakdown.      Toenail Condition: Left toenails are normal.      Comments: Dry skin with yellow crusting noted in the webbing between the 4th and 5th metatarsals of the bilateral feet  Lymphadenopathy:      Cervical: No cervical adenopathy.   Skin:     General: Skin is warm and dry.      Capillary Refill: Capillary refill takes less than 2 seconds.   Neurological:      General: No focal deficit present.      Mental Status: She is alert and oriented to person, place, and time.   Psychiatric:         Mood and Affect: Mood normal.         Behavior: Behavior normal.                 Results for orders placed or performed in visit on 04/14/25   CBC (No Diff)    Collection Time: 04/16/25  9:52 AM    Specimen: Blood   Result Value Ref Range    WBC 9.92 3.40 - 10.80 10*3/mm3    RBC 3.88 3.77 - 5.28 10*6/mm3    Hemoglobin 12.5 12.0 - 15.9 g/dL    Hematocrit 37.3 34.0 - 46.6 %    MCV 96.1 79.0 - 97.0 fL    MCH 32.2 26.6 - 33.0 pg    MCHC 33.5 31.5 - 35.7 g/dL    RDW 12.3 12.3 - 15.4 %    Platelets 454 (H) 140 - 450 10*3/mm3   Comprehensive Metabolic  Panel    Collection Time: 04/16/25  9:52 AM    Specimen: Blood   Result Value Ref Range    Glucose 104 (H) 65 - 99 mg/dL    BUN 7 6 - 20 mg/dL    Creatinine 0.80 0.57 - 1.00 mg/dL    EGFR Result 87.7 >60.0 mL/min/1.73    BUN/Creatinine Ratio 8.8 7.0 - 25.0    Sodium 140 136 - 145 mmol/L    Potassium 4.0 3.5 - 5.2 mmol/L    Chloride 102 98 - 107 mmol/L    Total CO2 28.6 22.0 - 29.0 mmol/L    Calcium 9.8 8.6 - 10.5 mg/dL    Total Protein 7.3 6.0 - 8.5 g/dL    Albumin 4.2 3.5 - 5.2 g/dL    Globulin 3.1 gm/dL    A/G Ratio 1.4 g/dL    Total Bilirubin 0.4 0.0 - 1.2 mg/dL    Alkaline Phosphatase 95 39 - 117 U/L    AST (SGOT) 20 1 - 32 U/L    ALT (SGPT) 21 1 - 33 U/L   Lipid Panel With / Chol / HDL Ratio    Collection Time: 04/16/25  9:52 AM    Specimen: Blood   Result Value Ref Range    Total Cholesterol 197 0 - 200 mg/dL    Triglycerides 124 0 - 150 mg/dL    HDL Cholesterol 39 (L) 40 - 60 mg/dL    VLDL Cholesterol Robert 22 5 - 40 mg/dL    LDL Chol Calc (NIH) 136 (H) 0 - 100 mg/dL    Chol/HDL Ratio 5.05    TSH Rfx On Abnormal To Free T4    Collection Time: 04/16/25  9:52 AM    Specimen: Blood   Result Value Ref Range    TSH 2.460 0.270 - 4.200 uIU/mL   Hemoglobin A1c    Collection Time: 04/16/25  9:52 AM    Specimen: Blood   Result Value Ref Range    Hemoglobin A1C 6.40 (H) 4.80 - 5.60 %   Microalbumin / Creatinine Urine Ratio - Urine, Clean Catch    Collection Time: 04/16/25  9:52 AM    Specimen: Urine, Clean Catch   Result Value Ref Range    Creatinine, Urine 72.2 Not Estab. mg/dL    Microalbumin, Urine 18.4 Not Estab. ug/mL    Microalbumin/Creatinine Ratio 25 0 - 29 mg/g creat     Assessment & Plan   Healthy female exam.  Diagnoses and all orders for this visit:    Annual Physical Exam   Reviewed fasting lab work with patient today.  Medications were reviewed and are being taken as prescribed with no adverse effects.  No refills needed today.    2. Type 2 diabetes mellitus with hyperglycemia, without long-term current use  of insulin (Primary)  New undiagnosed problem with uncertain prognosis.  - A1c is 6.4, indicating she is on the cusp of diabetes, a trend observed to be increasing over several years.  - Metformin extended-release 500 mg once daily will be started to help lower blood sugar and aid in weight loss.  - Discussed potential gastrointestinal side effects, including stomach cramping, constipation, diarrhea, and nausea.  - A 30-day supply will be called into [facility] pharmacy. If A1c does not improve in 3 months, the dosage may be increased or adjusted to twice daily.  -     metFORMIN ER (GLUCOPHAGE-XR) 500 MG 24 hr tablet; Take 1 tablet by mouth Daily With Breakfast. Indications: OBESITY, Type 2 Diabetes  Dispense: 30 tablet; Refill: 5    3. Class 3 severe obesity due to excess calories with serious comorbidity and body mass index (BMI) of 45.0 to 49.9 in adult    4. Mass of wrist, right  5. Carpal tunnel syndrome on right  New undiagnosed problem with uncertain prognosis.  - Reports a lump on the right wrist, present for about a month, with occasional numbness and tingling in the fingers of the right hand.  - Physical exam indicates possible nerve compression by the mass causing numbness and tingling.  - Referral to a hand specialist for further evaluation and potential treatment of the mass.  - Advised to use ibuprofen or Tylenol for inflammation and wear a wrist splint, especially at night, to keep the wrist straight and reduce symptoms and to modify aggravating activities.  -     Ambulatory Referral to Hand Surgery    6. Tinea pedis of both feet  New undiagnosed problem with uncertain prognosis.  - Area of dry skin with yellow crust on the foot on the webbing between the 4th and 5th metatarsals may indicate a fungal skin infection.  - Rx cream will be prescribed for application.  - Advised to use a pumice stone to scrub the affected area and apply cream twice daily for the next 1 to 2 weeks and monitor for any  signs of injury, infection, numbness or tingling, which should be reported if they occur.  -     clotrimazole-betamethasone (LOTRISONE) 1-0.05 % cream; Apply 1 Application topically to the appropriate area as directed 2 (Two) Times a Day. Indications: Athlete's Foot  Dispense: 15 g; Refill: 1    7. Elevated platelet count  Chronic, stable.  - Platelet count is mildly elevated, consistent with historical lab results.  - No immediate intervention required, but will be monitored in future lab tests.    8. Hypercholesterolemia  Chronic, uncontrolled. LDL cholesterol has decreased since last lab check and is currently at 136.  With a diagnosis of diabetes, LDL goal then significantly decreases to 70.  I have started her on metformin today and we will give her 3 months to improve her blood sugar and lose weight.  If her A1c continues to be elevated, we will add a cholesterol medication at her next visit to lower her LDL to 70.      Assessment & Plan      1. - Advised to continue regular exercise routine of walking five days a week.  - Encouraged to schedule a dental appointment, as she has not had one in the last six months.  - Advised to start taking vitamin D3 supplements at a dose of 1000 units daily, available over the counter, to prevent osteoporosis post menopause.  2. Patient Counseling:  --Nutrition: Stressed importance of moderation in sodium/caffeine intake, saturated fat and cholesterol.  Discussed caloric balance, sufficient intake of fresh fruits, vegetables, fiber,    calcium, iron.  --Exercise: Stressed the importance of regular exercise.   --Substance Abuse: Discussed cessation/primary prevention of tobacco, alcohol, or other drug use; driving or other dangerous activities under the influence.    --Dental health: Discussed importance of regular tooth brushing, flossing, and dental visits.  --Suggested having eyes and vision checked if needed or past due.  --Immunizations reviewed.  --Discussed benefits of  screening colonoscopy.  3. Discussed the patient's BMI with her.  The BMI is above average; BMI management plan is completed  4. Follow up in 3 months    Patient was given instructions and counseling regarding condition or for health maintenance advice.  Please see specific information pulled into the AVS if appropriate.      There are no discontinued medications.       CLINT Smith  [facility]  [address] [place] [address]

## 2025-08-04 ENCOUNTER — OFFICE VISIT (OUTPATIENT)
Dept: FAMILY MEDICINE CLINIC | Facility: CLINIC | Age: 55
End: 2025-08-04
Payer: COMMERCIAL

## 2025-08-04 VITALS
OXYGEN SATURATION: 100 % | DIASTOLIC BLOOD PRESSURE: 80 MMHG | HEIGHT: 63 IN | BODY MASS INDEX: 45.95 KG/M2 | HEART RATE: 82 BPM | WEIGHT: 259.3 LBS | TEMPERATURE: 97.7 F | SYSTOLIC BLOOD PRESSURE: 130 MMHG

## 2025-08-04 DIAGNOSIS — B35.3 TINEA PEDIS OF BOTH FEET: ICD-10-CM

## 2025-08-04 DIAGNOSIS — I10 ESSENTIAL HYPERTENSION: ICD-10-CM

## 2025-08-04 DIAGNOSIS — G56.01 CARPAL TUNNEL SYNDROME ON RIGHT: ICD-10-CM

## 2025-08-04 DIAGNOSIS — M67.431 GANGLION CYST OF WRIST, RIGHT: ICD-10-CM

## 2025-08-04 DIAGNOSIS — E78.00 HYPERCHOLESTEROLEMIA: ICD-10-CM

## 2025-08-04 DIAGNOSIS — Z00.00 HEALTHCARE MAINTENANCE: ICD-10-CM

## 2025-08-04 DIAGNOSIS — M19.031 ARTHRITIS OF RIGHT WRIST: ICD-10-CM

## 2025-08-04 DIAGNOSIS — E11.65 TYPE 2 DIABETES MELLITUS WITH HYPERGLYCEMIA, WITHOUT LONG-TERM CURRENT USE OF INSULIN: Primary | ICD-10-CM

## 2025-08-04 DIAGNOSIS — M17.0 ARTHRITIS OF BOTH KNEES: ICD-10-CM

## 2025-08-04 PROCEDURE — 99214 OFFICE O/P EST MOD 30 MIN: CPT

## 2025-08-05 LAB
ALBUMIN SERPL-MCNC: 3.9 G/DL (ref 3.5–5.2)
ALBUMIN/GLOB SERPL: 1.3 G/DL
ALP SERPL-CCNC: 79 U/L (ref 39–117)
ALT SERPL-CCNC: 21 U/L (ref 1–33)
AST SERPL-CCNC: 22 U/L (ref 1–32)
BILIRUB SERPL-MCNC: <0.2 MG/DL (ref 0–1.2)
BUN SERPL-MCNC: 9 MG/DL (ref 6–20)
BUN/CREAT SERPL: 11.1 (ref 7–25)
CALCIUM SERPL-MCNC: 9.7 MG/DL (ref 8.6–10.5)
CHLORIDE SERPL-SCNC: 102 MMOL/L (ref 98–107)
CO2 SERPL-SCNC: 26.6 MMOL/L (ref 22–29)
CREAT SERPL-MCNC: 0.81 MG/DL (ref 0.57–1)
EGFRCR SERPLBLD CKD-EPI 2021: 86.4 ML/MIN/1.73
GLOBULIN SER CALC-MCNC: 3.1 GM/DL
GLUCOSE SERPL-MCNC: 100 MG/DL (ref 65–99)
HBA1C MFR BLD: 6.3 % (ref 4.8–5.6)
POTASSIUM SERPL-SCNC: 4.1 MMOL/L (ref 3.5–5.2)
PROT SERPL-MCNC: 7 G/DL (ref 6–8.5)
SODIUM SERPL-SCNC: 141 MMOL/L (ref 136–145)

## 2025-08-06 DIAGNOSIS — E11.65 TYPE 2 DIABETES MELLITUS WITH HYPERGLYCEMIA, WITHOUT LONG-TERM CURRENT USE OF INSULIN: ICD-10-CM

## 2025-08-06 RX ORDER — METFORMIN HYDROCHLORIDE 500 MG/1
500 TABLET, EXTENDED RELEASE ORAL 2 TIMES DAILY
Qty: 180 TABLET | Refills: 1 | Status: SHIPPED | OUTPATIENT
Start: 2025-08-06

## 2025-08-14 ENCOUNTER — OFFICE VISIT (OUTPATIENT)
Dept: ORTHOPEDIC SURGERY | Facility: CLINIC | Age: 55
End: 2025-08-14
Payer: COMMERCIAL

## 2025-08-14 VITALS
BODY MASS INDEX: 46.1 KG/M2 | SYSTOLIC BLOOD PRESSURE: 134 MMHG | DIASTOLIC BLOOD PRESSURE: 90 MMHG | HEART RATE: 92 BPM | WEIGHT: 260.2 LBS | HEIGHT: 63 IN

## 2025-08-14 DIAGNOSIS — M25.561 RIGHT KNEE PAIN, UNSPECIFIED CHRONICITY: ICD-10-CM

## 2025-08-14 DIAGNOSIS — M94.261 CHONDROMALACIA, RIGHT KNEE: Primary | ICD-10-CM

## 2025-08-14 RX ORDER — LIDOCAINE HYDROCHLORIDE 10 MG/ML
4 INJECTION, SOLUTION EPIDURAL; INFILTRATION; INTRACAUDAL; PERINEURAL
Status: COMPLETED | OUTPATIENT
Start: 2025-08-14 | End: 2025-08-14

## 2025-08-14 RX ORDER — TRIAMCINOLONE ACETONIDE 40 MG/ML
80 INJECTION, SUSPENSION INTRA-ARTICULAR; INTRAMUSCULAR
Status: COMPLETED | OUTPATIENT
Start: 2025-08-14 | End: 2025-08-14

## 2025-08-14 RX ADMIN — LIDOCAINE HYDROCHLORIDE 4 ML: 10 INJECTION, SOLUTION EPIDURAL; INFILTRATION; INTRACAUDAL; PERINEURAL at 11:55

## 2025-08-14 RX ADMIN — TRIAMCINOLONE ACETONIDE 80 MG: 40 INJECTION, SUSPENSION INTRA-ARTICULAR; INTRAMUSCULAR at 11:55
